# Patient Record
Sex: FEMALE | Race: WHITE | Employment: FULL TIME | URBAN - METROPOLITAN AREA
[De-identification: names, ages, dates, MRNs, and addresses within clinical notes are randomized per-mention and may not be internally consistent; named-entity substitution may affect disease eponyms.]

---

## 2017-07-24 PROBLEM — O09.299 HX OF PREECLAMPSIA, PRIOR PREGNANCY, CURRENTLY PREGNANT: Status: ACTIVE | Noted: 2017-07-24

## 2017-07-24 PROBLEM — Z20.828 PARVOVIRUS EXPOSURE: Status: ACTIVE | Noted: 2017-07-24

## 2017-07-24 PROBLEM — R01.1 HEART MURMUR: Status: ACTIVE | Noted: 2017-07-24

## 2017-07-26 PROBLEM — O09.899 MATERNAL VARICELLA, NON-IMMUNE: Status: ACTIVE | Noted: 2017-07-26

## 2017-07-26 PROBLEM — Z28.39 MATERNAL VARICELLA, NON-IMMUNE: Status: ACTIVE | Noted: 2017-07-26

## 2018-02-19 PROBLEM — B95.1 GROUP BETA STREP POSITIVE: Status: ACTIVE | Noted: 2018-02-19

## 2018-03-13 ENCOUNTER — HOSPITAL ENCOUNTER (EMERGENCY)
Age: 28
Discharge: HOME | End: 2018-03-13
Attending: OBSTETRICS & GYNECOLOGY | Admitting: OBSTETRICS & GYNECOLOGY
Payer: COMMERCIAL

## 2018-03-13 VITALS
TEMPERATURE: 98.1 F | SYSTOLIC BLOOD PRESSURE: 126 MMHG | WEIGHT: 226 LBS | BODY MASS INDEX: 35.47 KG/M2 | RESPIRATION RATE: 18 BRPM | DIASTOLIC BLOOD PRESSURE: 77 MMHG | OXYGEN SATURATION: 98 % | HEIGHT: 67 IN | HEART RATE: 100 BPM

## 2018-03-13 PROBLEM — O26.893 ABDOMINAL PAIN IN PREGNANCY, THIRD TRIMESTER: Status: ACTIVE | Noted: 2018-03-13

## 2018-03-13 PROBLEM — R10.9 ABDOMINAL PAIN IN PREGNANCY, THIRD TRIMESTER: Status: ACTIVE | Noted: 2018-03-13

## 2018-03-13 LAB
GLUCOSE, GLUUPC: NEGATIVE
KETONES UR-MCNC: NEGATIVE MG/DL
PROT UR QL: NEGATIVE

## 2018-03-13 PROCEDURE — 59025 FETAL NON-STRESS TEST: CPT

## 2018-03-13 PROCEDURE — 76815 OB US LIMITED FETUS(S): CPT

## 2018-03-13 PROCEDURE — 81002 URINALYSIS NONAUTO W/O SCOPE: CPT | Performed by: OBSTETRICS & GYNECOLOGY

## 2018-03-13 PROCEDURE — 99285 EMERGENCY DEPT VISIT HI MDM: CPT

## 2018-03-13 NOTE — IP AVS SNAPSHOT
[address] 
[phone] Patient: Amanda Segundo MRN: [id number] formerly [facility]: About your hospitalization You were admitted on:  N/A You last received care in the:  [facility] 4 OLLIE You were discharged on:  2018 Why you were hospitalized Your primary diagnosis was:  Not on File Your diagnoses also included:  Abdominal Pain In Pregnancy, Third Trimester Follow-up Information Follow up With Details Comments Contact Info Provider Unknown   Patient not available to ask Puja Caro MD Call As needed [address] [place] [address] 204 [facility] [address] 
[phone] Your Scheduled Appointments 2018 10:00 AM EDT  
OB VISIT with Stacey Cornelius MD  
[facility] ([place] [address]) [address] [place] [address] [address] [address] [phone] Discharge Orders None A check edis indicates which time of day the medication should be taken. My Medications ASK your doctor about these medications Instructions Each Dose to Equal  
 Morning Noon Evening Bedtime  
 aspirin delayed-release 81 mg tablet Your last dose was: Your next dose is: Take  by mouth daily. PNV#16-Iron Fum & PS-FA-OM-3 35-1-200 mg Cap Commonly known as:  CONCEPT DHA Your last dose was: Your next dose is: Take 1 Tab by mouth daily. 1 Tab VITAMIN D3 1,000 unit tablet Generic drug:  cholecalciferol Your last dose was: Your next dose is: Take  by mouth daily. Discharge Instructions Pregnancy Precautions: Care Instructions Your Care Instructions There is no sure way to prevent labor before your due date ( labor) or to prevent most other pregnancy problems. But there are things you can do to increase your chances of a healthy pregnancy. Go to your appointments, follow your doctor's advice, and take good care of yourself. Eat well, and exercise (if your doctor agrees). And make sure to drink plenty of water. Follow-up care is a key part of your treatment and safety. Be sure to make and go to all appointments, and call your doctor if you are having problems. It's also a good idea to know your test results and keep a list of the medicines you take. How can you care for yourself at home? · Make sure you go to your prenatal appointments. At each visit, your doctor will check your blood pressure. Your doctor will also check to see if you have protein in your urine. High blood pressure and protein in urine are signs of preeclampsia. This condition can be dangerous for you and your baby. · Drink plenty of fluids, enough so that your urine is light yellow or clear like water. Dehydration can cause contractions. If you have kidney, heart, or liver disease and have to limit fluids, talk with your doctor before you increase the amount of fluids you drink. · Tell your doctor right away if you notice any symptoms of an infection, such as: ¨ Burning when you urinate. ¨ A foul-smelling discharge from your vagina. ¨ Vaginal itching. ¨ Unexplained fever. ¨ Unusual pain or soreness in your uterus or lower belly. · Eat a balanced diet. Include plenty of foods that are high in calcium and iron. ¨ Foods high in calcium include milk, cheese, yogurt, almonds, and broccoli. ¨ Foods high in iron include red meat, shellfish, poultry, eggs, beans, raisins, whole-grain bread, and leafy green vegetables. · Do not smoke. If you need help quitting, talk to your doctor about stop-smoking programs and medicines. These can increase your chances of quitting for good. · Do not drink alcohol or use illegal drugs. · Follow your doctor's directions about activity. Your doctor will let you know how much, if any, exercise you can do. · Ask your doctor if you can have sex. If you are at risk for early labor, your doctor may ask you to not have sex. · Take care to prevent falls. During pregnancy, your joints are loose, and your balance is off. Sports such as bicycling, skiing, or in-line skating can increase your risk of falling. And don't ride horses or motorcycles, dive, water ski, scuba dive, or parachute jump while you are pregnant. · Avoid getting very hot. Do not use saunas or hot tubs. Avoid staying out in the sun in hot weather for long periods. Take acetaminophen (Tylenol) to lower a high fever. · Do not take any over-the-counter or herbal medicines or supplements without talking to your doctor or pharmacist first. 
When should you call for help? Call 911 anytime you think you may need emergency care. For example, call if: 
? · You passed out (lost consciousness). ? · You have severe vaginal bleeding. ? · You have severe pain in your belly or pelvis. ? · You have had fluid gushing or leaking from your vagina and you know or think the umbilical cord is bulging into your vagina. If this happens, immediately get down on your knees so your rear end (buttocks) is higher than your head. This will decrease the pressure on the cord until help arrives. ?Call your doctor now or seek immediate medical care if: 
? · You have signs of preeclampsia, such as: 
¨ Sudden swelling of your face, hands, or feet. ¨ New vision problems (such as dimness or blurring). ¨ A severe headache. ? · You have any vaginal bleeding. ? · You have belly pain or cramping. ? · You have a fever. ? · You have had regular contractions (with or without pain) for an hour. This means that you have 8 or more within 1 hour or 4 or more in 20 minutes after you change your position and drink fluids. ? · You have a sudden release of fluid from your vagina. ? · You have low back pain or pelvic pressure that does not go away. ? · You notice that your baby has stopped moving or is moving much less than normal. ? Watch closely for changes in your health, and be sure to contact your doctor if you have any problems. Where can you learn more? Go to http://jonathan-yann.info/. Enter [id number] in the search box to learn more about \"Pregnancy Precautions: Care Instructions. \" Current as of: March 16, 2017 Content Version: 11.4 © 9973-2129 [employer]. Care instructions adapted under license by linkedÃ¼ (which disclaims liability or warranty for this information). If you have questions about a medical condition or this instruction, always ask your healthcare professional. [address] any warranty or liability for your use of this information. Introducing [facility]! Dear Laya Said: 
Thank you for requesting a [employer] account. Our records indicate that you already have an active [employer] account. You can access your account anytime at [email or web address] Did you know that you can access your hospital and ER discharge instructions at any time in [employer]? You can also review all of your test results from your hospital stay or ER visit. Additional Information If you have questions, please visit the Frequently Asked Questions section of the [employer] website at [email or web address] Remember, [employer] is NOT to be used for urgent needs. For medical emergencies, dial 911. Now available from your iPhone and Android! Providers Seen During Your Hospitalization Provider Specialty Primary office phone Anish Bradley MD Obstetrics & Gynecology [phone] Your Primary Care Physician (PCP) Primary Care Physician Office Phone Office Fax UNKNOWN, PROVIDER ** None ** ** None ** You are allergic to the following Allergen Reactions Penicillins Anaphylaxis Swilling in the hands and throat Amoxicillin Rash Bactrim (Sulfamethoprim Ds) Nausea Only Recent Documentation Height Weight BMI OB Status Smoking Status 1.702 m 102.5 kg 35.4 kg/m2 Pregnant Never Smoker Emergency Contacts Name Discharge Info Relation Home Work Mobile [address] [employer]  Spouse [3] [phone] Patient Belongings The following personal items are in your possession at time of discharge: 
                             
 
  
  
 Please provide this summary of care documentation to your next provider. Signatures-by signing, you are acknowledging that this After Visit Summary has been reviewed with you and you have received a copy. Patient Signature:  ____________________________________________________________ Date:  ____________________________________________________________  
  
Aria Moyer Provider Signature:  ____________________________________________________________ Date:  ____________________________________________________________

## 2018-03-13 NOTE — ED PROVIDER NOTES
Chief Complaint:contractions      28 y.o. female   at 39w4d  weeks gestation who is seen for moderate abdominal pain. Pt reports contractions since yesterday. More frequent contractions today- occurring regularly about every 5 minutes. HISTORY:    History   Sexual Activity    Sexual activity: Yes    Partners: Male    Birth control/ protection: None     Patient's last menstrual period was 2017. Social History     Social History    Marital status:      Spouse name: N/A    Number of children: N/A    Years of education: N/A     Occupational History    Not on file. Social History Main Topics    Smoking status: Never Smoker    Smokeless tobacco: Never Used    Alcohol use No      Comment: occasionally    Drug use: No    Sexual activity: Yes     Partners: Male     Birth control/ protection: None     Other Topics Concern    Not on file     Social History Narrative       Past Surgical History:   Procedure Laterality Date    HX WISDOM TEETH EXTRACTION         Past Medical History:   Diagnosis Date    Asthma     exercise induced    Headache     Heart abnormality     heart murmur    Heart murmur     patient states is benign    Hypertension     Other ill-defined conditions(799.89)     Mono    PCOS (polycystic ovarian syndrome) 2014    Polycystic disease, ovaries     Preeclampsia     UTI (urinary tract infection)          ROS:  A 12 point review of symptoms negative except for chief complaint as described above. PHYSICAL EXAM:  Blood pressure 126/77, pulse 100, temperature 98.1 °F (36.7 °C), resp. rate 18, height 5' 7\" (1.702 m), weight 102.5 kg (226 lb), last menstrual period 2017, SpO2 98 %, not currently breastfeeding. Constitutional: The patient appears well, alert, oriented x 3. Cardiovascular: Heart RRR, no murmurs.    Respiratory: Lungs clear, no respiratory distress  GI: Abdomen soft, nontender, no guarding  No fundal tenderness  Musculoskeletal: no cva tenderness  Upper ext: no edema, reflexes +2  Lower ext: no edema, neg calin's, reflexes +2  Skin: no rashes or lesions  Psychiatric:Mood/ Affect: appropriate  Genitourinary: SVE:3/70/-2 vertex  FHT:160's mod variability, accels, occaisonal small variables  TOCO:q7-10 min    Bedside ultrasound- vertex, keysha = 9, lots of fetal movement and tone seen, fetal breathing seen  Ant placenta grade 2  I personally reviewed pt's medical record including relevant labs and ultrasounds    Assessment/Plan:  27 yo  at 39w4d in with early latent labor   Reactive nst, bedside ultrasound with excellent movement, tone and breathing  Home with precautions  gbs + , pcn allergic, resistant to clindamycin- plan in chart by dr. Jessica Flores is for vancomycin

## 2018-03-13 NOTE — PROGRESS NOTES
Discharge instructions and pregnancy precautions given by Dr Jed Méndez; patient verbalized understanding; instructions signed, but patient left before printed copy given

## 2018-03-13 NOTE — DISCHARGE INSTRUCTIONS
Pregnancy Precautions: Care Instructions  Your Care Instructions    There is no sure way to prevent labor before your due date ( labor) or to prevent most other pregnancy problems. But there are things you can do to increase your chances of a healthy pregnancy. Go to your appointments, follow your doctor's advice, and take good care of yourself. Eat well, and exercise (if your doctor agrees). And make sure to drink plenty of water. Follow-up care is a key part of your treatment and safety. Be sure to make and go to all appointments, and call your doctor if you are having problems. It's also a good idea to know your test results and keep a list of the medicines you take. How can you care for yourself at home? · Make sure you go to your prenatal appointments. At each visit, your doctor will check your blood pressure. Your doctor will also check to see if you have protein in your urine. High blood pressure and protein in urine are signs of preeclampsia. This condition can be dangerous for you and your baby. · Drink plenty of fluids, enough so that your urine is light yellow or clear like water. Dehydration can cause contractions. If you have kidney, heart, or liver disease and have to limit fluids, talk with your doctor before you increase the amount of fluids you drink. · Tell your doctor right away if you notice any symptoms of an infection, such as:  ¨ Burning when you urinate. ¨ A foul-smelling discharge from your vagina. ¨ Vaginal itching. ¨ Unexplained fever. ¨ Unusual pain or soreness in your uterus or lower belly. · Eat a balanced diet. Include plenty of foods that are high in calcium and iron. ¨ Foods high in calcium include milk, cheese, yogurt, almonds, and broccoli. ¨ Foods high in iron include red meat, shellfish, poultry, eggs, beans, raisins, whole-grain bread, and leafy green vegetables. · Do not smoke.  If you need help quitting, talk to your doctor about stop-smoking programs and medicines. These can increase your chances of quitting for good. · Do not drink alcohol or use illegal drugs. · Follow your doctor's directions about activity. Your doctor will let you know how much, if any, exercise you can do. · Ask your doctor if you can have sex. If you are at risk for early labor, your doctor may ask you to not have sex. · Take care to prevent falls. During pregnancy, your joints are loose, and your balance is off. Sports such as bicycling, skiing, or in-line skating can increase your risk of falling. And don't ride horses or motorcycles, dive, water ski, scuba dive, or parachute jump while you are pregnant. · Avoid getting very hot. Do not use saunas or hot tubs. Avoid staying out in the sun in hot weather for long periods. Take acetaminophen (Tylenol) to lower a high fever. · Do not take any over-the-counter or herbal medicines or supplements without talking to your doctor or pharmacist first.  When should you call for help? Call 911 anytime you think you may need emergency care. For example, call if:  ? · You passed out (lost consciousness). ? · You have severe vaginal bleeding. ? · You have severe pain in your belly or pelvis. ? · You have had fluid gushing or leaking from your vagina and you know or think the umbilical cord is bulging into your vagina. If this happens, immediately get down on your knees so your rear end (buttocks) is higher than your head. This will decrease the pressure on the cord until help arrives. ?Call your doctor now or seek immediate medical care if:  ? · You have signs of preeclampsia, such as:  ¨ Sudden swelling of your face, hands, or feet. ¨ New vision problems (such as dimness or blurring). ¨ A severe headache. ? · You have any vaginal bleeding. ? · You have belly pain or cramping. ? · You have a fever. ? · You have had regular contractions (with or without pain) for an hour.  This means that you have 8 or more within 1 hour or 4 or more in 20 minutes after you change your position and drink fluids. ? · You have a sudden release of fluid from your vagina. ? · You have low back pain or pelvic pressure that does not go away. ? · You notice that your baby has stopped moving or is moving much less than normal.   ? Watch closely for changes in your health, and be sure to contact your doctor if you have any problems. Where can you learn more? Go to http://jonathan-yann.info/. Enter [id number] in the search box to learn more about \"Pregnancy Precautions: Care Instructions. \"  Current as of: March 16, 2017  Content Version: 11.4  © 2324-2163 [employer]. Care instructions adapted under license by [employer] (which disclaims liability or warranty for this information). If you have questions about a medical condition or this instruction, always ask your healthcare professional. [address] any warranty or liability for your use of this information.

## 2018-03-13 NOTE — PROGRESS NOTES
Patient presented to [facility] ED with complaint of uterine contractions; frequency 5-7 min per patient       03/13/18 1446   Maternal Vital Signs   Temp 98.1 °F (36.7 °C)   Temp Source Oral   Pulse (Heart Rate) 100   Resp Rate 18   O2 Sat (%) 98 %   Level of Consciousness Alert   /77   MAP (Calculated) 93   BP 1 Method Automatic   BP 1 Location Right arm   MEWS Score 1   Uterine Activity   Uterine Resting Tone Relaxed   Pain 1   Pain Scale 1 Numeric (0 - 10)   Pain Intensity 1 1   Patient Stated Pain Goal 2   Pain Location 1 Abdomen;Rectal   Pain Description 1 Intermittent;Pressure   Spinal Dermatomes   Motor Function Ambulate w/o assistance   Oxygen Therapy   O2 Device Room air   Height/Weight   Height 5' 7\" (1.702 m)   Weight 102.5 kg (226 lb)   Weight Source Patient stated   BSA (calculated - sq m) 2.2 sq meters   BMI (calculated) 35.4   Patient Observation   Patient Turned Turns self   Vaginal Discharge   Vaginal Discharge Yes   Color Clear   Consistency Thick   Amount Small   Vaginal Bleeding   Vaginal Bleeding No   Pre-Eclampsia Assessment   Headache No   Visual Disturbances No   Epigastric Pain No     Labor  Abdominal pain in pregnancy, third trimester    Hospital Problems  Date Reviewed: 3/6/2018          Codes Class Noted POA    Abdominal pain in pregnancy, third trimester ICD-10-CM: O26.893, R10.9  ICD-9-CM: 646.83, 789.00  3/13/2018 Unknown              <principal problem not specified>    Patient Active Problem List   Diagnosis Code    Encounter for immunization Z23    PCOS (polycystic ovarian syndrome) E28.2    Pregnancy Z34.90    Heart murmur R01.1    Hx of preeclampsia, prior pregnancy, currently pregnant O09.299    Maternal varicella, non-immune O09.899, Z28.3    Group beta Strep positive B95.1    Abdominal pain in pregnancy, third trimester O26.893, R10.9       Patient Active Problem List    Diagnosis Date Noted    Abdominal pain in pregnancy, third trimester 03/13/2018    Group beta Strep positive 02/19/2018    Maternal varicella, non-immune 07/26/2017    Heart murmur 07/24/2017    Hx of preeclampsia, prior pregnancy, currently pregnant 07/24/2017    Pregnancy 10/28/2015    PCOS (polycystic ovarian syndrome) 11/12/2014    Encounter for immunization 07/23/2014       Allergies   Allergen Reactions    Penicillins Anaphylaxis     Swilling in the hands and throat     Amoxicillin Rash    Bactrim [Sulfamethoprim Ds] Nausea Only     Lab Results   Component Value Date/Time    Antibody screen Negative 07/24/2017 02:58 PM    Antibody screen, External Negative 07/24/2017    HBsAg, External Negative 07/24/2017    HIV, External NR 07/24/2017    Rubella, External Immune 07/24/2017    RPR, External NR 07/24/2017    Gonorrhea, External negative 09/05/2017    Chlamydia, External negative 09/05/2017    GrBStrep, External positive 02/16/2018    ABO,Rh O positive 07/24/2017

## 2018-03-14 ENCOUNTER — HOSPITAL ENCOUNTER (INPATIENT)
Age: 28
LOS: 3 days | Discharge: HOME | End: 2018-03-17
Attending: OBSTETRICS & GYNECOLOGY | Admitting: OBSTETRICS & GYNECOLOGY
Payer: COMMERCIAL

## 2018-03-14 DIAGNOSIS — Z37.9 NORMAL LABOR: Primary | ICD-10-CM

## 2018-03-14 LAB
ERYTHROCYTE [DISTWIDTH] IN BLOOD BY AUTOMATED COUNT: 16.4 % (ref 11.9–14.6)
HCT VFR BLD AUTO: 34.4 % (ref 35.8–46.3)
HGB BLD-MCNC: 11.1 G/DL (ref 11.7–15.4)
MCH RBC QN AUTO: 25.5 PG (ref 26.1–32.9)
MCHC RBC AUTO-ENTMCNC: 32.3 G/DL (ref 31.4–35)
MCV RBC AUTO: 79.1 FL (ref 79.6–97.8)
PLATELET # BLD AUTO: 390 K/UL (ref 150–450)
PMV BLD AUTO: 10.7 FL (ref 10.8–14.1)
RBC # BLD AUTO: 4.35 M/UL (ref 4.05–5.25)
WBC # BLD AUTO: 16.3 K/UL (ref 4.3–11.1)

## 2018-03-14 PROCEDURE — 4A1HXCZ MONITORING OF PRODUCTS OF CONCEPTION, CARDIAC RATE, EXTERNAL APPROACH: ICD-10-PCS | Performed by: OBSTETRICS & GYNECOLOGY

## 2018-03-14 PROCEDURE — 77030014125 HC TY EPDRL BBMI -B: Performed by: NURSE ANESTHETIST, CERTIFIED REGISTERED

## 2018-03-14 PROCEDURE — 74011250636 HC RX REV CODE- 250/636

## 2018-03-14 PROCEDURE — 74011250636 HC RX REV CODE- 250/636: Performed by: OBSTETRICS & GYNECOLOGY

## 2018-03-14 PROCEDURE — A4300 CATH IMPL VASC ACCESS PORTAL: HCPCS | Performed by: NURSE ANESTHETIST, CERTIFIED REGISTERED

## 2018-03-14 PROCEDURE — 74011258636 HC RX REV CODE- 258/636: Performed by: OBSTETRICS & GYNECOLOGY

## 2018-03-14 PROCEDURE — 86900 BLOOD TYPING SEROLOGIC ABO: CPT | Performed by: OBSTETRICS & GYNECOLOGY

## 2018-03-14 PROCEDURE — 65270000029 HC RM PRIVATE

## 2018-03-14 PROCEDURE — 85027 COMPLETE CBC AUTOMATED: CPT | Performed by: OBSTETRICS & GYNECOLOGY

## 2018-03-14 RX ORDER — SODIUM CHLORIDE 0.9 % (FLUSH) 0.9 %
5-10 SYRINGE (ML) INJECTION AS NEEDED
Status: DISCONTINUED | OUTPATIENT
Start: 2018-03-14 | End: 2018-03-15

## 2018-03-14 RX ORDER — DEXTROSE, SODIUM CHLORIDE, SODIUM LACTATE, POTASSIUM CHLORIDE, AND CALCIUM CHLORIDE 5; .6; .31; .03; .02 G/100ML; G/100ML; G/100ML; G/100ML; G/100ML
125 INJECTION, SOLUTION INTRAVENOUS CONTINUOUS
Status: DISCONTINUED | OUTPATIENT
Start: 2018-03-15 | End: 2018-03-15

## 2018-03-14 RX ORDER — ONDANSETRON 2 MG/ML
4 INJECTION INTRAMUSCULAR; INTRAVENOUS
Status: DISCONTINUED | OUTPATIENT
Start: 2018-03-14 | End: 2018-03-15 | Stop reason: HOSPADM

## 2018-03-14 RX ORDER — SODIUM CHLORIDE 0.9 % (FLUSH) 0.9 %
5-10 SYRINGE (ML) INJECTION EVERY 8 HOURS
Status: DISCONTINUED | OUTPATIENT
Start: 2018-03-15 | End: 2018-03-15

## 2018-03-14 RX ORDER — BUTORPHANOL TARTRATE 1 MG/ML
1 INJECTION INTRAMUSCULAR; INTRAVENOUS
Status: DISCONTINUED | OUTPATIENT
Start: 2018-03-14 | End: 2018-03-15 | Stop reason: SDUPTHER

## 2018-03-14 RX ORDER — LIDOCAINE HYDROCHLORIDE 20 MG/ML
JELLY TOPICAL
Status: DISCONTINUED | OUTPATIENT
Start: 2018-03-14 | End: 2018-03-15 | Stop reason: HOSPADM

## 2018-03-14 RX ORDER — MINERAL OIL
120 OIL (ML) ORAL
Status: DISCONTINUED | OUTPATIENT
Start: 2018-03-14 | End: 2018-03-15 | Stop reason: HOSPADM

## 2018-03-14 RX ORDER — FENTANYL CITRATE 50 UG/ML
INJECTION, SOLUTION INTRAMUSCULAR; INTRAVENOUS
Status: COMPLETED
Start: 2018-03-14 | End: 2018-03-15

## 2018-03-14 RX ORDER — LIDOCAINE HYDROCHLORIDE 10 MG/ML
1 INJECTION, SOLUTION EPIDURAL; INFILTRATION; INTRACAUDAL; PERINEURAL
Status: DISCONTINUED | OUTPATIENT
Start: 2018-03-14 | End: 2018-03-15 | Stop reason: HOSPADM

## 2018-03-14 RX ORDER — OXYTOCIN/0.9 % SODIUM CHLORIDE 15/250 ML
250 PLASTIC BAG, INJECTION (ML) INTRAVENOUS ONCE
Status: ACTIVE | OUTPATIENT
Start: 2018-03-15 | End: 2018-03-15

## 2018-03-14 RX ADMIN — SODIUM CHLORIDE, SODIUM LACTATE, POTASSIUM CHLORIDE, CALCIUM CHLORIDE, AND DEXTROSE MONOHYDRATE 125 ML/HR: 600; 310; 30; 20; 5 INJECTION, SOLUTION INTRAVENOUS at 23:20

## 2018-03-14 RX ADMIN — VANCOMYCIN HYDROCHLORIDE 1000 MG: 1 INJECTION, POWDER, LYOPHILIZED, FOR SOLUTION INTRAVENOUS at 23:47

## 2018-03-14 RX ADMIN — SODIUM CHLORIDE, SODIUM LACTATE, POTASSIUM CHLORIDE, AND CALCIUM CHLORIDE 1000 ML: 600; 310; 30; 20 INJECTION, SOLUTION INTRAVENOUS at 23:20

## 2018-03-14 NOTE — IP AVS SNAPSHOT
303 [facility] [address] 
[phone] Patient: Pepe Farris MRN: [id number] [id number] About your hospitalization You were admitted on:  March 14, 2018 You last received care in the:  [address] [facility] You were discharged on:  March 17, 2018 Why you were hospitalized Your primary diagnosis was:  Not on File Your diagnoses also included:  Normal Labor Follow-up Information Follow up With Details Comments Contact Info Meliton Ramirez MD Schedule an appointment as soon as possible for a visit in 2 weeks  [address] [place] [address] 204 [facility] [address] 
[phone] Discharge Orders None A check edis indicates which time of day the medication should be taken. My Medications START taking these medications Instructions Each Dose to Equal  
 Morning Noon Evening Bedtime HYDROcodone-acetaminophen 7.5-325 mg per tablet Commonly known as:  Ирина Sosa Your last dose was: Your next dose is: Take 1 Tab by mouth every four (4) hours as needed. Max Daily Amount: 6 Tabs. Indications: Pain 1 Tab  
    
   
   
   
  
 ibuprofen 600 mg tablet Commonly known as:  MOTRIN Your last dose was: Your next dose is: Take 1 Tab by mouth every six (6) hours as needed. Indications: Pain 600 mg CONTINUE taking these medications Instructions Each Dose to Equal  
 Morning Noon Evening Bedtime PNV#16-Iron Fum & PS-FA-OM-3 35-1-200 mg Cap Commonly known as:  CONCEPT DHA Your last dose was: Your next dose is: Take 1 Tab by mouth daily. 1 Tab VITAMIN D3 1,000 unit tablet Generic drug:  cholecalciferol Your last dose was: Your next dose is: Take  by mouth daily. STOP taking these medications   
 aspirin delayed-release 81 mg tablet Where to Get Your Medications Information on where to get these meds will be given to you by the nurse or doctor. ! Ask your nurse or doctor about these medications HYDROcodone-acetaminophen 7.5-325 mg per tablet  
 ibuprofen 600 mg tablet Discharge Instructions Discharge instruction to follow: Activity: Pelvis rest for 6 weeks No heavy lifting over 15 lbs for 2 weeks No driving for 2 weeks No push/pull motion such as sweeping or vacuuming for 2 weeks No tub baths for 6 weeks Continue using the dagoberto-bottle after each void or bowel movement. If using sitz bath continue until comfortable stopping. Change sanitary pad after each urination or bowel movement. Call MD for the following: 
    Fever over 101 F; pain not relieved by medication; foul smelling vaginal discharge or an increase in vaginal bleeding. Take medication as prescribed. Follow up with MD as order. DISCHARGE SUMMARY from Nurse What to do at Home: 
Recommended activity: Activity as tolerated, *  Please give a list of your current medications to your Primary Care Provider. *  Please update this list whenever your medications are discontinued, doses are 
    changed, or new medications (including over-the-counter products) are added. *  Please carry medication information at all times in case of emergency situations. These are general instructions for a healthy lifestyle: No smoking/ No tobacco products/ Avoid exposure to second hand smoke Surgeon General's Warning:  Quitting smoking now greatly reduces serious risk to your health. Obesity, smoking, and sedentary lifestyle greatly increases your risk for illness A healthy diet, regular physical exercise & weight monitoring are important for maintaining a healthy lifestyle You may be retaining fluid if you have a history of heart failure or if you experience any of the following symptoms:  Weight gain of 3 pounds or more overnight or 5 pounds in a week, increased swelling in our hands or feet or shortness of breath while lying flat in bed. Please call your doctor as soon as you notice any of these symptoms; do not wait until your next office visit. Recognize signs and symptoms of STROKE: 
 
F-face looks uneven A-arms unable to move or move unevenly S-speech slurred or non-existent T-time-call 911 as soon as signs and symptoms begin-DO NOT go Back to bed or wait to see if you get better-TIME IS BRAIN. Warning Signs of HEART ATTACK Call 911 if you have these symptoms: 
? Chest discomfort. Most heart attacks involve discomfort in the center of the chest that lasts more than a few minutes, or that goes away and comes back. It can feel like uncomfortable pressure, squeezing, fullness, or pain. ? Discomfort in other areas of the upper body. Symptoms can include pain or discomfort in one or both arms, the back, neck, jaw, or stomach. ? Shortness of breath with or without chest discomfort. ? Other signs may include breaking out in a cold sweat, nausea, or lightheadedness. Don't wait more than five minutes to call [address]! Fast action can save your life. Calling 911 is almost always the fastest way to get lifesaving treatment. Emergency Medical Services staff can begin treatment when they arrive  up to an hour sooner than if someone gets to the hospital by car. The discharge information has been reviewed with the patient. The patient verbalized understanding. Discharge medications reviewed with the patient and appropriate educational materials and side effects teaching were provided. ___________________________________________________________________________________________________________________________________ Vaginal Childbirth: Care Instructions Your Care Instructions Your body will slowly heal in the next few weeks. It is easy to get too tired and overwhelmed during the first weeks after your baby is born. Changes in your hormones can shift your mood without warning. You may find it hard to meet the extra demands on your energy and time. Take it easy on yourself. Follow-up care is a key part of your treatment and safety. Be sure to make and go to all appointments, and call your doctor if you are having problems. It's also a good idea to know your test results and keep a list of the medicines you take. How can you care for yourself at home? · Vaginal bleeding and cramps ¨ After delivery, you will have a bloody discharge from the vagina. This will turn pink within a week and then white or yellow after about 10 days. It may last for 2 to 4 weeks or longer, until the uterus has healed. Use pads instead of tampons until you stop bleeding. ¨ Do not worry if you pass some blood clots, as long as they are smaller than a golf ball. If you have a tear or stitches in your vaginal area, change the pad at least every 4 hours to prevent soreness and infection. ¨ You may have cramps for the first few days after childbirth. These are normal and occur as the uterus shrinks to normal size. Take an over-the-counter pain medicine, such as acetaminophen (Tylenol), ibuprofen (Advil, Motrin), or naproxen (Aleve), for cramps. Read and follow all instructions on the label. Do not take aspirin, because it can cause more bleeding. ¨ Do not take two or more pain medicines at the same time unless the doctor told you to. Many pain medicines have acetaminophen, which is Tylenol. Too much acetaminophen (Tylenol) can be harmful. · Stitches ¨ If you have stitches, they will dissolve on their own and do not need to be removed. Follow your doctor's instructions for cleaning the stitched area. ¨ Put ice or a cold pack on your painful area for 10 to 20 minutes at a time, several times a day, for the first few days. Put a thin cloth between the ice and your skin. ¨ Sit in a few inches of warm water (sitz bath) 3 times a day and after bowel movements. The warm water helps with pain and itching. If you do not have a tub, a warm shower might help. · Breast fullness ¨ Your breasts may overfill (engorge) in the first few days after delivery. To help milk flow and to relieve pain, warm your breasts in the shower or by using warm, moist towels before nursing. ¨ If you are not nursing, do not put warmth on your breasts or touch your breasts. Wear a tight bra or sports bra and use ice until the fullness goes away. This usually takes 2 to 3 days. ¨ Put ice or a cold pack on your breast after nursing to reduce swelling and pain. Put a thin cloth between the ice and your skin. · Activity ¨ Eat a balanced diet. Do not try to lose weight by cutting calories. Keep taking your prenatal vitamins, or take a multivitamin. ¨ Get as much rest as you can. Try to take naps when your baby sleeps during the day. ¨ Get some exercise every day. But do not do any heavy exercise until your doctor says it is okay. ¨ Wait until you are healed (about 4 to 6 weeks) before you have sexual intercourse. Your doctor will tell you when it is okay to have sex. ¨ Talk to your doctor about birth control. You can get pregnant even before your period returns. Also, you can get pregnant while you are breastfeeding. · Mental health ¨ It is normal to have some sadness, anxiety, sleeplessness, and mood swings after you go home. If you feel upset or hopeless for more than a few days or are having trouble doing the things you need to do, talk to your doctor. · Constipation and hemorrhoids ¨ Drink plenty of fluids, enough so that your urine is light yellow or clear like water.  If you have kidney, heart, or liver disease and have to limit fluids, talk with your doctor before you increase the amount of fluids you drink. ¨ Eat plenty of fiber each day. Have a bran muffin or bran cereal for breakfast, and try eating a piece of fruit for a mid-afternoon snack. ¨ For painful, itchy hemorrhoids, put ice or a cold pack on the area several times a day for 10 minutes at a time. Follow this by putting a warm compress on the area for another 10 to 20 minutes or by sitting in a shallow, warm bath. When should you call for help? Call 911 anytime you think you may need emergency care. For example, call if: 
? · You passed out (lost consciousness). ?Call your doctor now or seek immediate medical care if: 
? · You have severe vaginal bleeding. ? · You are dizzy or lightheaded, or you feel like you may faint. ? · You have a fever. ? · You have new or more pain in your belly or pelvis. ? Watch closely for changes in your health, and be sure to contact your doctor if: 
? · Your vaginal bleeding seems to be getting heavier. ? · You have new or worse vaginal discharge. ? · You feel sad, anxious, or hopeless for more than a few days. ? · You do not get better as expected. Where can you learn more? Go to http://jonathan-yann.info/. Enter G185 in the search box to learn more about \"Vaginal Childbirth: Care Instructions. \" Current as of: March 16, 2017 Content Version: 11.4 © 5729-5655 [employer]. Care instructions adapted under license by [employer] (which disclaims liability or warranty for this information). If you have questions about a medical condition or this instruction, always ask your healthcare professional. [place] [address] any warranty or liability for your use of this information. [facility] Announcement We are excited to announce that we are making your provider's discharge notes available to you in [facility].   You will see these notes when they are completed and signed by the physician that discharged you from your recent hospital stay. If you have any questions or concerns about any information you see in [employer]iate, please call the Health Information Department where you were seen or reach out to your Primary Care Provider for more information about your plan of care. Introducing [facility] & HEALTH SERVICES! Dear Laya Said: 
Thank you for requesting a Netotiate account. Our records indicate that you already have an active Netotiate account. You can access your account anytime at [email or web address] Did you know that you can access your hospital and ER discharge instructions at any time in Netotiate? You can also review all of your test results from your hospital stay or ER visit. Additional Information If you have questions, please visit the Frequently Asked Questions section of the Netotiate website at [email or web address] Remember, Netotiate is NOT to be used for urgent needs. For medical emergencies, dial 911. Now available from your iPhone and Android! Providers Seen During Your Hospitalization Provider Specialty Primary office phone Peter GuerreroangleDO Obstetrics & Gynecology [phone] Immunizations Administered for This Admission Name Date Influenza Vaccine (Quad) PF  Deferred () Your Primary Care Physician (PCP) Primary Care Physician Office Phone Office Fax UNKNOWN, PROVIDER ** None ** ** None ** You are allergic to the following Allergen Reactions Penicillins Anaphylaxis Swilling in the hands and throat Amoxicillin Rash Bactrim (Sulfamethoprim Ds) Nausea Only Recent Documentation Height Weight Breastfeeding? BMI OB Status Smoking Status 1.702 m 102.5 kg Yes 35.4 kg/m2 Recent pregnancy Never Smoker Emergency Contacts Name Discharge Info Relation Home Work Mobile [address] [facility]  Spouse [3] [phone] Patient Belongings The following personal items are in your possession at time of discharge: 
  Dental Appliances: None  Visual Aid: Glasses, At home      Home Medications: None   Jewelry: None  Clothing: At bedside, Footwear, Jacket/Coat, Pants, Shirt, Undergarments    Other Valuables: At bedside, Cell Phone  Personal Items Sent to Safe: declined Please provide this summary of care documentation to your next provider. Signatures-by signing, you are acknowledging that this After Visit Summary has been reviewed with you and you have received a copy. Patient Signature:  ____________________________________________________________ Date:  ____________________________________________________________  
  
[facility] Provider Signature:  ____________________________________________________________ Date:  ____________________________________________________________

## 2018-03-15 ENCOUNTER — ANESTHESIA EVENT (OUTPATIENT)
Dept: LABOR AND DELIVERY | Age: 28
End: 2018-03-15
Payer: COMMERCIAL

## 2018-03-15 ENCOUNTER — ANESTHESIA (OUTPATIENT)
Dept: LABOR AND DELIVERY | Age: 28
End: 2018-03-15
Payer: COMMERCIAL

## 2018-03-15 LAB
ABO + RH BLD: NORMAL
BASE DEFICIT BLDCOA-SCNC: 3.9 MMOL/L (ref 0–2)
BASE DEFICIT BLDCOV-SCNC: 5.4 MMOL/L (ref 1.9–7.7)
BDY SITE: ABNORMAL
BDY SITE: NORMAL
BLOOD GROUP ANTIBODIES SERPL: NORMAL
HCO3 BLDCOA-SCNC: 24 MMOL/L (ref 22–26)
HCO3 BLDV-SCNC: 21 MMOL/L
PCO2 BLDCOA: 53 MMHG (ref 33–49)
PCO2 BLDCOV: 43 MMHG (ref 14.1–43.3)
PH BLDCOA: 7.27 [PH] (ref 7.21–7.31)
PH BLDCOV: 7.3 [PH] (ref 7.2–7.44)
PO2 BLDCOA: 24 MMHG (ref 9–19)
PO2 BLDV: 34 MMHG (ref 30.4–57.2)
SERVICE CMNT-IMP: ABNORMAL
SERVICE CMNT-IMP: NORMAL
SPECIMEN EXP DATE BLD: NORMAL

## 2018-03-15 PROCEDURE — 77030003028 HC SUT VCRL J&J -A

## 2018-03-15 PROCEDURE — 74011250637 HC RX REV CODE- 250/637: Performed by: OBSTETRICS & GYNECOLOGY

## 2018-03-15 PROCEDURE — 75410000003 HC RECOV DEL/VAG/CSECN EA 0.5 HR

## 2018-03-15 PROCEDURE — 0HQ9XZZ REPAIR PERINEUM SKIN, EXTERNAL APPROACH: ICD-10-PCS | Performed by: OBSTETRICS & GYNECOLOGY

## 2018-03-15 PROCEDURE — 99283 EMERGENCY DEPT VISIT LOW MDM: CPT

## 2018-03-15 PROCEDURE — 75410000000 HC DELIVERY VAGINAL/SINGLE

## 2018-03-15 PROCEDURE — 75410000002 HC LABOR FEE PER 1 HR

## 2018-03-15 PROCEDURE — 74011250636 HC RX REV CODE- 250/636

## 2018-03-15 PROCEDURE — 77010026065 HC OXYGEN MINIMUM MEDICAL AIR

## 2018-03-15 PROCEDURE — 74011250636 HC RX REV CODE- 250/636: Performed by: OBSTETRICS & GYNECOLOGY

## 2018-03-15 PROCEDURE — 59025 FETAL NON-STRESS TEST: CPT

## 2018-03-15 PROCEDURE — 76060000078 HC EPIDURAL ANESTHESIA

## 2018-03-15 PROCEDURE — 65270000029 HC RM PRIVATE

## 2018-03-15 PROCEDURE — 74011250636 HC RX REV CODE- 250/636: Performed by: ANESTHESIOLOGY

## 2018-03-15 PROCEDURE — 74011000250 HC RX REV CODE- 250

## 2018-03-15 PROCEDURE — 77030018846 HC SOL IRR STRL H20 ICUM -A

## 2018-03-15 PROCEDURE — 36415 COLL VENOUS BLD VENIPUNCTURE: CPT | Performed by: OBSTETRICS & GYNECOLOGY

## 2018-03-15 PROCEDURE — 82803 BLOOD GASES ANY COMBINATION: CPT

## 2018-03-15 RX ORDER — ZOLPIDEM TARTRATE 5 MG/1
5 TABLET ORAL
Status: DISCONTINUED | OUTPATIENT
Start: 2018-03-15 | End: 2018-03-17 | Stop reason: HOSPADM

## 2018-03-15 RX ORDER — SODIUM CHLORIDE 0.9 % (FLUSH) 0.9 %
5-10 SYRINGE (ML) INJECTION AS NEEDED
Status: DISCONTINUED | OUTPATIENT
Start: 2018-03-15 | End: 2018-03-15 | Stop reason: HOSPADM

## 2018-03-15 RX ORDER — SIMETHICONE 80 MG
80 TABLET,CHEWABLE ORAL
Status: DISCONTINUED | OUTPATIENT
Start: 2018-03-15 | End: 2018-03-17 | Stop reason: HOSPADM

## 2018-03-15 RX ORDER — DIPHENHYDRAMINE HCL 25 MG
25 CAPSULE ORAL
Status: DISCONTINUED | OUTPATIENT
Start: 2018-03-15 | End: 2018-03-17 | Stop reason: HOSPADM

## 2018-03-15 RX ORDER — IBUPROFEN 400 MG/1
400 TABLET ORAL
Status: DISCONTINUED | OUTPATIENT
Start: 2018-03-15 | End: 2018-03-17 | Stop reason: HOSPADM

## 2018-03-15 RX ORDER — OXYCODONE AND ACETAMINOPHEN 10; 325 MG/1; MG/1
1 TABLET ORAL
Status: DISCONTINUED | OUTPATIENT
Start: 2018-03-15 | End: 2018-03-17 | Stop reason: HOSPADM

## 2018-03-15 RX ORDER — SODIUM CHLORIDE 0.9 % (FLUSH) 0.9 %
5-10 SYRINGE (ML) INJECTION EVERY 8 HOURS
Status: DISCONTINUED | OUTPATIENT
Start: 2018-03-15 | End: 2018-03-15 | Stop reason: HOSPADM

## 2018-03-15 RX ORDER — LIDOCAINE HYDROCHLORIDE AND EPINEPHRINE 15; 5 MG/ML; UG/ML
INJECTION, SOLUTION EPIDURAL AS NEEDED
Status: DISCONTINUED | OUTPATIENT
Start: 2018-03-15 | End: 2018-03-15 | Stop reason: HOSPADM

## 2018-03-15 RX ORDER — FENTANYL CITRATE 50 UG/ML
INJECTION, SOLUTION INTRAMUSCULAR; INTRAVENOUS AS NEEDED
Status: DISCONTINUED | OUTPATIENT
Start: 2018-03-15 | End: 2018-03-15 | Stop reason: HOSPADM

## 2018-03-15 RX ORDER — HYDROCODONE BITARTRATE AND ACETAMINOPHEN 7.5; 325 MG/1; MG/1
1 TABLET ORAL
Status: DISCONTINUED | OUTPATIENT
Start: 2018-03-15 | End: 2018-03-17 | Stop reason: HOSPADM

## 2018-03-15 RX ORDER — NALOXONE HYDROCHLORIDE 0.4 MG/ML
0.4 INJECTION, SOLUTION INTRAMUSCULAR; INTRAVENOUS; SUBCUTANEOUS AS NEEDED
Status: DISCONTINUED | OUTPATIENT
Start: 2018-03-15 | End: 2018-03-17 | Stop reason: HOSPADM

## 2018-03-15 RX ORDER — ROPIVACAINE HYDROCHLORIDE 2 MG/ML
INJECTION, SOLUTION EPIDURAL; INFILTRATION; PERINEURAL
Status: DISCONTINUED | OUTPATIENT
Start: 2018-03-15 | End: 2018-03-15 | Stop reason: HOSPADM

## 2018-03-15 RX ORDER — ROPIVACAINE HYDROCHLORIDE 2 MG/ML
INJECTION, SOLUTION EPIDURAL; INFILTRATION; PERINEURAL AS NEEDED
Status: DISCONTINUED | OUTPATIENT
Start: 2018-03-15 | End: 2018-03-15 | Stop reason: HOSPADM

## 2018-03-15 RX ADMIN — IBUPROFEN 400 MG: 400 TABLET, FILM COATED ORAL at 22:45

## 2018-03-15 RX ADMIN — ROPIVACAINE HYDROCHLORIDE 10 ML/HR: 2 INJECTION, SOLUTION EPIDURAL; INFILTRATION; PERINEURAL at 00:07

## 2018-03-15 RX ADMIN — LIDOCAINE HYDROCHLORIDE AND EPINEPHRINE 2 ML: 15; 5 INJECTION, SOLUTION EPIDURAL at 00:07

## 2018-03-15 RX ADMIN — IBUPROFEN 400 MG: 400 TABLET, FILM COATED ORAL at 03:51

## 2018-03-15 RX ADMIN — FENTANYL CITRATE 100 MCG: 50 INJECTION, SOLUTION INTRAMUSCULAR; INTRAVENOUS at 00:08

## 2018-03-15 RX ADMIN — IBUPROFEN 400 MG: 400 TABLET, FILM COATED ORAL at 15:03

## 2018-03-15 RX ADMIN — SODIUM CHLORIDE, SODIUM LACTATE, POTASSIUM CHLORIDE, AND CALCIUM CHLORIDE 1000 ML: 600; 310; 30; 20 INJECTION, SOLUTION INTRAVENOUS at 00:57

## 2018-03-15 RX ADMIN — LIDOCAINE HYDROCHLORIDE AND EPINEPHRINE 3 ML: 15; 5 INJECTION, SOLUTION EPIDURAL at 00:06

## 2018-03-15 RX ADMIN — IBUPROFEN 400 MG: 400 TABLET, FILM COATED ORAL at 19:01

## 2018-03-15 RX ADMIN — ROPIVACAINE HYDROCHLORIDE 5 ML: 2 INJECTION, SOLUTION EPIDURAL; INFILTRATION; PERINEURAL at 00:07

## 2018-03-15 RX ADMIN — HYDROCODONE BITARTRATE AND ACETAMINOPHEN 1 TABLET: 7.5; 325 TABLET ORAL at 22:45

## 2018-03-15 RX ADMIN — VANCOMYCIN HYDROCHLORIDE 1000 MG: 1 INJECTION, POWDER, LYOPHILIZED, FOR SOLUTION INTRAVENOUS at 00:52

## 2018-03-15 RX ADMIN — IBUPROFEN 400 MG: 400 TABLET, FILM COATED ORAL at 10:31

## 2018-03-15 NOTE — L&D DELIVERY NOTE
Delivery Summary    Patient: Lauren Oconnell MRN: [id number]  SSN: [id number]    YOB: 1990  Age: 28 y.o. Sex: female        Labor Events:    Labor: No    Rupture Date:      Rupture Time:      Rupture Type SROM    Amniotic Fluid Volume:      Amniotic Fluid Description: Meconium  None    Induction: None        Augmentation: None    Labor Complications: None     Additional Complications:        Cervical Ripening:       None      Delivery Events:  Episiotomy: None    Laceration(s): First degree perineal      Repaired: Yes     Number of Repair Packets:      Suture Type and Size:         Estimated Blood Loss (ml): 300        Information for the patient's :  Marcella Velez, Pending  [[id number]]     Delivery Summary - Baby    Delivery Date:    Delivery Time:    Delivery Type: Vaginal, Spontaneous Delivery  Sex:  unspecified sex  Gestational Age: 37w11d  Delivery Clinician:     Living?: Living   Delivery Location: L&D             APGARS  One minute Five minutes Ten minutes   Skin Color:            Heart Rate:             Reflex Irritability:             Muscle Tone:           Respiration:             Total:               Presentation: Vertex  Position: Left Occiput Anterior  Resuscitation Method:  None     Meconium Stained: Thin    Cord Information: 3 Vessels   Complications: Nuchal Cord With Compressions; Other(Comment) (Comment)  Cord Blood Sent?:  Yes    Blood Gases Sent?:  Yes    Placenta:  Date/Time: 3/15  3:19 AM  Removal: Spontaneous      Appearance: Normal      Measurements:  Birth Weight:      Birth Length:     Head Circumference:       Chest Circumference:      Abdominal Girth:       Other Providers:   Kashif Coleman Obstetrician;Primary Nurse;Primary [place] Nurse;Nicu Nurse;Neonatologist;Anesthesiologist;Crna;Nurse Practitioner;Midwife;Nursery Nurse           Cord Blood Results:  Information for the patient's :  Marcella Velez, Pending  [[id number]]   No results found for: Jeffery Arsalan, PCTDIG, BILI, ABORHEXT, [address]    Information for the patient's :  Lieutenant Guzman, Pending  [[id number]]   No results found for: APH, APCO2, APO2, AHCO3, ABEC, ABDC, O2ST, SITE, RSCOM, PHI, PCO2I, PO2I, HCO3I, SO2I, IBD     Information for the patient's :  Lieutenant Guzman, Pending  [[id number]]   No results found for: EPHV, PCO2V, PO2V, HCO3V, O2STV, EBDV   over 1st degree laceration without complications, delivery of head and tight nuchal cord X 2 noted, cord clamped and cut and reduced and shoulders delivered without dystocia. Spontaneous delivery of placenta. Meconium noted- infant initially to Moms chest but taken to warmer by RN. Repair in usual fashion with excellent hemostasis and cosmesis, perineal swelling noted prior to delivery.  R-V exam normal

## 2018-03-15 NOTE — PROGRESS NOTES
Admission assessment complete, see flowsheet for complete assessment. Questions answered accordingly. Denies any needs or concerns at this time. Patient resting comfortably in bed with family at bedside. Call light within reach. Encouraged to call prn-verbalizes understanding.

## 2018-03-15 NOTE — PROGRESS NOTES
Bedside report completed with Silvino WATSON RN. Plan of care reviewed with patient, verbalized understanding. Care assumed.

## 2018-03-15 NOTE — ANESTHESIA POSTPROCEDURE EVALUATION
Post-Anesthesia Evaluation and Assessment    Patient: Eun Daly MRN: [id number]  SSN: [id number]    YOB: 1990  Age: 28 y.o. Sex: female       Cardiovascular Function/Vital Signs  Visit Vitals    /64    Pulse 81    Temp 36.6 °C (97.9 °F)    Resp 16    Ht 5' 7\" (1.702 m)    Wt 102.5 kg (226 lb)    Breastfeeding Yes    BMI 35.4 kg/m2       Patient is status post epidural anesthesia for * No procedures listed *. Nausea/Vomiting: None    Postoperative hydration reviewed and adequate. Pain:  Pain Scale 1: Numeric (0 - 10) (03/15/18 0039)  Pain Intensity 1: 0 (03/15/18 0039)   Managed    Neurological Status: At baseline    Mental Status and Level of Consciousness: Awake. Pulmonary Status:   O2 Device: Room air (03/15/18 0530)   Adequate oxygenation and airway patent    Complications related to anesthesia: None    Post-anesthesia assessment completed. No concerns  The patient was satisfied with her labor epidural and denies any complications. Her lower extremities have returned to baseline neurologically.     Signed By: Lindsay Mercado MD     March 15, 2018

## 2018-03-15 NOTE — LACTATION NOTE
Discussed plan of care with patient. Discussed pt's choice of infant feeding method. Feeding options explored, including the importance of exclusive breastfeeding. Pt informed of our breastfeeding support system from from nursing staff and lactation staff during inpatient stay and following discharge. Questions and concerns addressed at this time. Pt confirms breastfeeding is her decision at this time. Vicky Martinez

## 2018-03-15 NOTE — PROGRESS NOTES
SBAR OUT Report: Mother    Verbal report given to Theresa Rodrigues RN on this patient, who is now being transferred to MIU Room 450 for routine progression of care. The patient is not wearing a green \"Anesthesia-Duramorph\" band. Report consisted of patient's Situation, Background, Assessment and Recommendations (SBAR). [place] ID bands were compared with the identification form, and verified with the patient and receiving nurse. Information from the SBAR and the [facility] Report was reviewed with the receiving nurse; opportunity for questions and clarification provided.

## 2018-03-15 NOTE — ROUTINE PROCESS
SBAR IN Report: Mother    Verbal report received from [facility] (full name & credentials) on this patient, who is now being transferred from L&D (unit) for routine progression of care. The patient is not wearing a green \"Anesthesia-Duramorph\" band. Report consisted of patient's Situation, Background, Assessment and Recommendations (SBAR). [place] ID bands were compared with the identification form, and verified with the patient and transferring nurse. Information from the Procedure Summary and the [place] Report was reviewed with the transferring nurse; opportunity for questions and clarification provided.

## 2018-03-15 NOTE — ANESTHESIA PREPROCEDURE EVALUATION
Anesthetic History   No history of anesthetic complications            Review of Systems / Medical History  Pertinent labs reviewed    Pulmonary            Asthma (exercise induced)        Neuro/Psych         Headaches     Cardiovascular      Valvular problems/murmurs (h/o murmur)            Exercise tolerance: >4 METS     GI/Hepatic/Renal  Within defined limits              Endo/Other        Obesity     Other Findings              Physical Exam    Airway  Mallampati: II  TM Distance: 4 - 6 cm  Neck ROM: normal range of motion   Mouth opening: Normal     Cardiovascular  Regular rate and rhythm,  S1 and S2 normal,  no murmur, click, rub, or gallop             Dental  No notable dental hx       Pulmonary  Breath sounds clear to auscultation               Abdominal  GI exam deferred       Other Findings            Anesthetic Plan    ASA: 2  Anesthesia type: epidural            Anesthetic plan and risks discussed with: Patient and Spouse

## 2018-03-15 NOTE — ANESTHESIA PROCEDURE NOTES
Epidural Block    Performed by: Arsenio Mayen  Authorized by: Jorge URIOSTEGUI     Pre-Procedure  Indication: procedure for pain and labor epidural    Preanesthetic Checklist: patient identified, risks and benefits discussed, anesthesia consent, patient being monitored, timeout performed and anesthesia consent    Timeout Time: 23:57        Epidural:   Patient position:  Seated  Prep region:  Lumbar  Prep: Chlorhexidine    Location:  L3-4    Needle and Epidural Catheter:   Needle Type:  Tuohy  Needle Gauge:  17 G  Injection Technique:  Loss of resistance using air  Attempts:  1  Catheter Size:  19 G  Catheter at Skin Depth (cm):  11  Depth in Epidural Space (cm):  5  Events: no blood with aspiration, no cerebrospinal fluid with aspiration, no paresthesia and negative aspiration test    Test Dose:  Lidocaine 1.5% w/ epi and negative    Assessment:   Catheter Secured:  Tegaderm and tape  Insertion:  Uncomplicated  Patient tolerance:  Patient tolerated the procedure well with no immediate complications

## 2018-03-15 NOTE — PROGRESS NOTES
2347 Dr. Nicolette Carey at bedside for an epidural placement. Patient sitting up on side of bed for an epidural placement. 0007 Test dose per Dr. Nicolette Carey, see anesthesia record for dosing. BPs per flowsheet. 0013 Right hip tilt, toco and ultrasound adjusted.

## 2018-03-15 NOTE — PROGRESS NOTES
Dr Al Pacheco in  Room SVE 5-6/90/-1 will admit to 432 Patient request to walk while waiting on epidural

## 2018-03-15 NOTE — PROGRESS NOTES
Patient admitted to room 432 for labor. Oriented to room and bed. EFM/TOCO tested and applied. Abdomen palpated soft and non-tender. +FM.

## 2018-03-15 NOTE — H&P
History & Physical    Name: Eun Daly MRN: [id number]  SSN: [id number]    YOB: 1990  Age: 28 y.o. Sex: female      CC: uterine ctx  Subjective:Pt presents in labor. She denies VB or LOF. Estimated Date of Delivery: 3/16/18  OB History    Para Term  AB Living   2 1 1   1   SAB TAB Ectopic Molar Multiple Live Births              # Outcome Date GA Lbr Oni/2nd Weight Sex Delivery Anes PTL Lv   2 Current            1 Term 16 38w4d  2.693 kg F Vag-Spont EPIDURAL AN N       Complications: Preeclampsia,Nuchal cord          Ms. Marichuy Mercado is seen with pregnancy at 39w5d for active labor. Prenatal course was normal.  the patients states that the baby moves as usual   Please see prenatal records for details. Past Medical History:   Diagnosis Date    Asthma     exercise induced    Headache     Heart abnormality     heart murmur    Heart murmur     patient states is benign    Hypertension     Other ill-defined conditions(799.89)     Mono    PCOS (polycystic ovarian syndrome) 2014    Polycystic disease, ovaries     Preeclampsia     UTI (urinary tract infection)      Past Surgical History:   Procedure Laterality Date    HX WISDOM TEETH EXTRACTION       Social History     Occupational History    Not on file.      Social History Main Topics    Smoking status: Never Smoker    Smokeless tobacco: Never Used    Alcohol use No      Comment: occasionally    Drug use: No    Sexual activity: Yes     Partners: Male     Birth control/ protection: None     Family History   Problem Relation Age of Onset    Ovarian Cancer Mother     Arthritis-osteo Mother     Migraines Mother     Cancer Mother    [employer] Bladder Disease Father     Thyroid Disease Father     Cancer Father     Mental Retardation Brother     Lung Disease Maternal Grandmother     Heart Disease Maternal Grandfather     Thyroid Cancer Paternal Grandfather        Allergies   Allergen Reactions  Penicillins Anaphylaxis     Swilling in the hands and throat     Amoxicillin Rash    Bactrim [Sulfamethoprim Ds] Nausea Only     Prior to Admission medications    Medication Sig Start Date End Date Taking? Authorizing Provider   PNV#16-Iron Fum & PS-FA-OM-3 (CONCEPT DHA) 35-1-200 mg cap Take 1 Tab by mouth daily. 10/12/15  Yes Kayla Lomax MD   aspirin delayed-release 81 mg tablet Take  by mouth daily. Historical Provider   cholecalciferol (VITAMIN D3) 1,000 unit tablet Take  by mouth daily. Historical Provider        Review of Systems:  Constitutional:No headache, fever  Cardiac:   No chest pain      Resp: No cough or shortness of breath     GI:   No nausea/vomiting, diarrhea, abdominal pain    :   No dysuria  Neuro:     No vision changes, headache      Objective:     Vitals:  Vitals:    18 2217   Weight: 102.5 kg (226 lb)   Height: 5' 7\" (1.702 m)        Physical Exam:  Patient with distress. Heart: Regular rate and rhythm  Lung: clear to auscultation throughout lung fields, no wheezes, no rales, no rhonchi and normal respiratory effort  Back: costovertebral angle tenderness absent  Abdomen: soft, nontender  Fundus: soft and non tender  Cervical Exam: 5-6cm/80%/vtx/-2  Lower Extremities: no evidence of DVT  Membranes:  Intact  Fetal Heart Rate: Baseline: 150 per minute  Variability: moderate  Accelerations: yes  Decelerations: none  Uterine contractions: regular, every 4 minutes    Prenatal Labs:   Lab Results   Component Value Date/Time    Rubella, External Immune 2017    GrBStrep, External positive 2018    HBsAg, External Negative 2017    HIV, External NR 2017    RPR, External NR 2017    Gonorrhea, External negative 2017    Chlamydia, External negative 2017         Assessment/Plan:     Ms. Marline Hoffman is a  seen with pregnancy at 39w5d for active labor.      Lab Results   Component Value Date/Time    GrBStrep, External positive 2018 Plan:     Admit for labor management; start IV Vancomycin for GBS positive status. Patient discussed with Dr. Jeni Rosa.

## 2018-03-16 PROCEDURE — 74011250637 HC RX REV CODE- 250/637: Performed by: OBSTETRICS & GYNECOLOGY

## 2018-03-16 PROCEDURE — 65270000029 HC RM PRIVATE

## 2018-03-16 RX ORDER — IBUPROFEN 600 MG/1
600 TABLET ORAL
Qty: 40 TAB | Refills: 1 | Status: SHIPPED | OUTPATIENT
Start: 2018-03-16 | End: 2021-11-15

## 2018-03-16 RX ORDER — HYDROCODONE BITARTRATE AND ACETAMINOPHEN 7.5; 325 MG/1; MG/1
1 TABLET ORAL
Qty: 15 TAB | Refills: 0 | Status: SHIPPED | OUTPATIENT
Start: 2018-03-16 | End: 2020-10-13

## 2018-03-16 RX ADMIN — IBUPROFEN 400 MG: 400 TABLET, FILM COATED ORAL at 22:02

## 2018-03-16 RX ADMIN — IBUPROFEN 400 MG: 400 TABLET, FILM COATED ORAL at 03:56

## 2018-03-16 RX ADMIN — IBUPROFEN 400 MG: 400 TABLET, FILM COATED ORAL at 15:16

## 2018-03-16 RX ADMIN — WITCH HAZEL 1 PAD: 500 SOLUTION RECTAL; TOPICAL at 20:51

## 2018-03-16 NOTE — LACTATION NOTE
This note was copied from a baby's chart. 2nd time mom, flat nipples, mom had to use nipple shield during entire breastfeeding duration with last child,  x 10 months with first child (25 months old now). Mom brought nipple shield from home and has been using it to latch this baby to the breast since birth. Baby still in first 24 hours of life. Mom reports baby latched well x 2 this morning but has been sleepy or spitting up/gaggy since then. Has attempted a few times but no latch. Baby gagging on and off now but then settled between, mom knows to sit upright and pat back, bulb syringe if needed. Baby also fussy. Mom wanted to attempt latch, use nipple shield, she brought shield from home. Assisted mom to suction nipple shield onto breast properly. Mom put baby to breast in cradle hold, baby tolerated nipple shield in mouth but did not latch, slept at the breast. Discussed signs of good latch and active effective feeding versus attempt/poor latch. Mom reports baby had good latch this morning only, has not latched since. Reviewed use of extreme caution with such early introduction of nipple shield for effective milk transfer for infant but also for direct stimulation of breast and milk supply. Instructed mom that if continued use of nipple shield desired, need to begin insurance pumping after all feeds. Mom agreeable. Pump brought in and set up. Since baby did not latch now and has not latched in over 12 hours, assisted mom to pump now. Showed mom hands on pumping technique. Mom obtained 8ml. Since baby gagging and spitting up, will not give now. Mom will give with slow flow nipple since using nipple shield to latch. Colostrum guidelines reviewed. RN updated. Needs close follow up to monitor weight gain and milk supply.

## 2018-03-16 NOTE — PROGRESS NOTES
Report received from [address] [place] [id number], [facility].

## 2018-03-16 NOTE — LACTATION NOTE

## 2018-03-16 NOTE — DISCHARGE SUMMARY
[address] OB Discharge Summary     Patient ID:  Edd Keller  [id number]  28 y.o.  1990    Admit date: 3/14/2018    Discharge date and time: No discharge date for patient encounter. Admitting Physician: Adriana Chavez MD     Discharge Physician: Shari Eldridge M.D. Admission Diagnoses: Labor;Normal labor    Problem List: Hospital - Active Problems:    Normal labor (3/14/2018)     ; Other -   Patient Active Problem List   Diagnosis Code    Encounter for immunization Z23    PCOS (polycystic ovarian syndrome) E28.2    Pregnancy Z34.90    Heart murmur R01.1    Hx of preeclampsia, prior pregnancy, currently pregnant O09.299    Maternal varicella, non-immune O09.899, Z28.3    Group beta Strep positive B95.1    Abdominal pain in pregnancy, third trimester O26.893, R10.9    Normal labor O80, Z37.9        Discharge Diagnoses: Labor;Normal labor    Hospital Course: Edd Keller had unremarkeable progressive recovery. and Eating, ambulating, and voiding in a routine manner. Significant Diagnostic Studies: No results found for this or any previous visit (from the past 24 hour(s)). Procedures: spontaneous vaginal delivery    Discharge Exam:  Visit Vitals    /65 (BP 1 Location: Right arm, BP Patient Position: At rest)    Pulse 93    Temp 97.9 °F (36.6 °C)    Resp 18    Ht 5' 7\" (1.702 m)    Wt 226 lb (102.5 kg)    LMP 05/19/2017    Breastfeeding Yes    BMI 35.4 kg/m2        Heart: regular rate and rhythm, S1, S2 normal, no murmur, click, rub or gallop  Lungs:clear to auscultation bilaterally  Extremities: normal, atraumatic, no cyanosis or edema. No DVT  Incision/episiotomy: no significant drainage, no dehiscence, no significant erythema    Patient Instructions:   Current Discharge Medication List      START taking these medications    Details   HYDROcodone-acetaminophen (NORCO) 7.5-325 mg per tablet Take 1 Tab by mouth every four (4) hours as needed.  Max Daily Amount: 6 Tabs. Indications: Pain  Qty: 15 Tab, Refills: 0    Associated Diagnoses: Normal labor      ibuprofen (MOTRIN) 600 mg tablet Take 1 Tab by mouth every six (6) hours as needed. Indications: Pain  Qty: 40 Tab, Refills: 1    Associated Diagnoses: Normal labor         CONTINUE these medications which have NOT CHANGED    Details   PNV#16-Iron Fum & PS-FA-OM-3 (CONCEPT DHA) 35-1-200 mg cap Take 1 Tab by mouth daily. Qty: 30 Cap, Refills: 11      cholecalciferol (VITAMIN D3) 1,000 unit tablet Take  by mouth daily. STOP taking these medications       aspirin delayed-release 81 mg tablet Comments:   Reason for Stopping:              Activity: physical activity is restricted per discharge instructions  Diet: resume normal diet  Wound Care: Keep wound clean and dry, as directed    Follow-up with Sukhdeep in 2 weeks.     Signed:  Stacey Cornelius MD  3/16/2018  10:02 AM

## 2018-03-16 NOTE — LACTATION NOTE
This note was copied from a baby's chart. In to check on feedings. Mom continues to use nipple shield. Pumping after some feedings. Pumped 20 mls this morning. Giving colostrum back to infant via curve tip syringe. Infant latched to left breast in cradle hold at time of visit. Good latch noted. Infant actively feeding. Good jaw movement and good breast tissue movement. Reviewed importance of pumping after feedings due to using nipple shield for good stimulation of breasts for full milk supply. Reviewed normal colostrum volumes. Reviewed baby's second night and normalcy of cluster feeding. Did supplement x1, early am, due to no void at 24 hours of age. Has had 2 voids since per parents. Encouraged to continue to give back colostrum to infant after feedings. Mom denies needs or assistance. Lactation to follow up tomorrow.

## 2018-03-16 NOTE — PROGRESS NOTES
Report received from [facility]. Patient care assumed-bedside reporting completed. Pt sleeping. No signs of distress. Spouse sleeping at bedside.

## 2018-03-16 NOTE — PROGRESS NOTES
Motrin 400 mg PO given to pt per request.  Educated pt to call out if pain medication does not help.

## 2018-03-17 VITALS
BODY MASS INDEX: 35.47 KG/M2 | HEIGHT: 67 IN | TEMPERATURE: 97.8 F | HEART RATE: 87 BPM | RESPIRATION RATE: 18 BRPM | SYSTOLIC BLOOD PRESSURE: 112 MMHG | DIASTOLIC BLOOD PRESSURE: 79 MMHG | WEIGHT: 226 LBS

## 2018-03-17 PROCEDURE — 74011250637 HC RX REV CODE- 250/637: Performed by: OBSTETRICS & GYNECOLOGY

## 2018-03-17 RX ADMIN — IBUPROFEN 400 MG: 400 TABLET, FILM COATED ORAL at 11:00

## 2018-03-17 NOTE — LACTATION NOTE
In to see mom and infant prior to discharge to home. Mom stated that infant is latching and nursing well with nippleshield. Mom is confident and has no concerns at this time. Mom stated that she has not been pumping routinely here but plans to pump more after she is discharged to home. Instructed mom to take her breast pump kit home with her. Reviewed discharge information. Mom and infant are following up with [facility] and will see lactation consultant there.

## 2018-03-17 NOTE — PROGRESS NOTES
Report received from Marilyn Duffy RN. Patient care assumed-bedside reporting completed. Pt resting quietly. Denies any needs. Spouse sleeping at bedside.

## 2018-03-17 NOTE — DISCHARGE INSTRUCTIONS
Discharge instruction to follow: Activity: Pelvis rest for 6 weeks     No heavy lifting over 15 lbs for 2 weeks     No driving for 2 weeks     No push/pull motion such as sweeping or vacuuming for 2 weeks     No tub baths for 6 weeks    Continue using the dagoberto-bottle after each void or bowel movement. If using sitz bath continue until comfortable stopping. Change sanitary pad after each urination or bowel movement. Call MD for the following:      Fever over 101 F; pain not relieved by medication; foul smelling vaginal discharge or an increase in vaginal bleeding. Take medication as prescribed. Follow up with MD as order. DISCHARGE SUMMARY from Nurse    What to do at Home:  Recommended activity: Activity as tolerated,     *  Please give a list of your current medications to your Primary Care Provider. *  Please update this list whenever your medications are discontinued, doses are      changed, or new medications (including over-the-counter products) are added. *  Please carry medication information at all times in case of emergency situations. These are general instructions for a healthy lifestyle:    No smoking/ No tobacco products/ Avoid exposure to second hand smoke  Surgeon General's Warning:  Quitting smoking now greatly reduces serious risk to your health. Obesity, smoking, and sedentary lifestyle greatly increases your risk for illness    A healthy diet, regular physical exercise & weight monitoring are important for maintaining a healthy lifestyle    You may be retaining fluid if you have a history of heart failure or if you experience any of the following symptoms:  Weight gain of 3 pounds or more overnight or 5 pounds in a week, increased swelling in our hands or feet or shortness of breath while lying flat in bed. Please call your doctor as soon as you notice any of these symptoms; do not wait until your next office visit.     Recognize signs and symptoms of STROKE:    F-face looks uneven    A-arms unable to move or move unevenly    S-speech slurred or non-existent    T-time-call 911 as soon as signs and symptoms begin-DO NOT go       Back to bed or wait to see if you get better-TIME IS BRAIN. Warning Signs of HEART ATTACK     Call 911 if you have these symptoms:   Chest discomfort. Most heart attacks involve discomfort in the center of the chest that lasts more than a few minutes, or that goes away and comes back. It can feel like uncomfortable pressure, squeezing, fullness, or pain.  Discomfort in other areas of the upper body. Symptoms can include pain or discomfort in one or both arms, the back, neck, jaw, or stomach.  Shortness of breath with or without chest discomfort.  Other signs may include breaking out in a cold sweat, nausea, or lightheadedness. Don't wait more than five minutes to call 911 - MINUTES MATTER! Fast action can save your life. Calling 911 is almost always the fastest way to get lifesaving treatment. Emergency Medical Services staff can begin treatment when they arrive -- up to an hour sooner than if someone gets to the hospital by car. The discharge information has been reviewed with the patient. The patient verbalized understanding. Discharge medications reviewed with the patient and appropriate educational materials and side effects teaching were provided. ___________________________________________________________________________________________________________________________________       Vaginal Childbirth: Care Instructions  Your Care Instructions    Your body will slowly heal in the next few weeks. It is easy to get too tired and overwhelmed during the first weeks after your baby is born. Changes in your hormones can shift your mood without warning. You may find it hard to meet the extra demands on your energy and time. Take it easy on yourself. Follow-up care is a key part of your treatment and safety.  Be sure to make and go to all appointments, and call your doctor if you are having problems. It's also a good idea to know your test results and keep a list of the medicines you take. How can you care for yourself at home? · Vaginal bleeding and cramps  ¨ After delivery, you will have a bloody discharge from the vagina. This will turn pink within a week and then white or yellow after about 10 days. It may last for 2 to 4 weeks or longer, until the uterus has healed. Use pads instead of tampons until you stop bleeding. ¨ Do not worry if you pass some blood clots, as long as they are smaller than a golf ball. If you have a tear or stitches in your vaginal area, change the pad at least every 4 hours to prevent soreness and infection. ¨ You may have cramps for the first few days after childbirth. These are normal and occur as the uterus shrinks to normal size. Take an over-the-counter pain medicine, such as acetaminophen (Tylenol), ibuprofen (Advil, Motrin), or naproxen (Aleve), for cramps. Read and follow all instructions on the label. Do not take aspirin, because it can cause more bleeding. ¨ Do not take two or more pain medicines at the same time unless the doctor told you to. Many pain medicines have acetaminophen, which is Tylenol. Too much acetaminophen (Tylenol) can be harmful. · Stitches  ¨ If you have stitches, they will dissolve on their own and do not need to be removed. Follow your doctor's instructions for cleaning the stitched area. ¨ Put ice or a cold pack on your painful area for 10 to 20 minutes at a time, several times a day, for the first few days. Put a thin cloth between the ice and your skin. ¨ Sit in a few inches of warm water (sitz bath) 3 times a day and after bowel movements. The warm water helps with pain and itching. If you do not have a tub, a warm shower might help. · Breast fullness  ¨ Your breasts may overfill (engorge) in the first few days after delivery.  To help milk flow and to relieve pain, warm your breasts in the shower or by using warm, moist towels before nursing. ¨ If you are not nursing, do not put warmth on your breasts or touch your breasts. Wear a tight bra or sports bra and use ice until the fullness goes away. This usually takes 2 to 3 days. ¨ Put ice or a cold pack on your breast after nursing to reduce swelling and pain. Put a thin cloth between the ice and your skin. · Activity  ¨ Eat a balanced diet. Do not try to lose weight by cutting calories. Keep taking your prenatal vitamins, or take a multivitamin. ¨ Get as much rest as you can. Try to take naps when your baby sleeps during the day. ¨ Get some exercise every day. But do not do any heavy exercise until your doctor says it is okay. ¨ Wait until you are healed (about 4 to 6 weeks) before you have sexual intercourse. Your doctor will tell you when it is okay to have sex. ¨ Talk to your doctor about birth control. You can get pregnant even before your period returns. Also, you can get pregnant while you are breastfeeding. · Mental health  ¨ It is normal to have some sadness, anxiety, sleeplessness, and mood swings after you go home. If you feel upset or hopeless for more than a few days or are having trouble doing the things you need to do, talk to your doctor. · Constipation and hemorrhoids  ¨ Drink plenty of fluids, enough so that your urine is light yellow or clear like water. If you have kidney, heart, or liver disease and have to limit fluids, talk with your doctor before you increase the amount of fluids you drink. ¨ Eat plenty of fiber each day. Have a bran muffin or bran cereal for breakfast, and try eating a piece of fruit for a mid-afternoon snack. ¨ For painful, itchy hemorrhoids, put ice or a cold pack on the area several times a day for 10 minutes at a time. Follow this by putting a warm compress on the area for another 10 to 20 minutes or by sitting in a shallow, warm bath. When should you call for help?   Call [phone] anytime you think you may need emergency care. For example, call if:  ? · You passed out (lost consciousness). ?Call your doctor now or seek immediate medical care if:  ? · You have severe vaginal bleeding. ? · You are dizzy or lightheaded, or you feel like you may faint. ? · You have a fever. ? · You have new or more pain in your belly or pelvis. ? Watch closely for changes in your health, and be sure to contact your doctor if:  ? · Your vaginal bleeding seems to be getting heavier. ? · You have new or worse vaginal discharge. ? · You feel sad, anxious, or hopeless for more than a few days. ? · You do not get better as expected. Where can you learn more? Go to http://jonathan-yann.info/. Enter L335 in the search box to learn more about \"Vaginal Childbirth: Care Instructions. \"  Current as of: March 16, 2017  Content Version: 11.4  © 0545-5023 [employer]. Care instructions adapted under license by [employer] (which disclaims liability or warranty for this information). If you have questions about a medical condition or this instruction, always ask your healthcare professional. [address] any warranty or liability for your use of this information.

## 2018-03-19 ENCOUNTER — TELEPHONE (OUTPATIENT)
Dept: CASE MANAGEMENT | Age: 28
End: 2018-03-19

## 2018-03-19 NOTE — TELEPHONE ENCOUNTER
Phone call to patient at [phone] at the request of RN due to history of postpartum depression. Discussed signs/symptoms of postpartum depression/anxiety that patient experienced after delivery of her daughter in 2016. She states that onset was \"right away due to her traumatic entry and lasted for 6-8 weeks. \"  Patient described symptoms that included intrusive thoughts. Patient shared these concerns with her , but she was reluctant to tell anyone else \"because I didn't know what would happen. \"   normalized the impact of intrusive thoughts and assisted patient with identifying these as thoughts and not as intentions. Patient states that she has a prescription for Zoloft that her  has had filled. She does not plan to initiate medication unless either she or her  feel that it is warranted. Education provided on postpartum depression/anxiety. Patient was informed of local therapy practice that accepts her insurance ([facility]). Educational packet on postpartum depression information/resources mailed to patient's home. Patient also provided with 's contact information for any needs/questions.       Daina Trammell, [address] [facility]

## 2018-05-16 PROBLEM — O09.899 MATERNAL VARICELLA, NON-IMMUNE: Status: RESOLVED | Noted: 2017-07-26 | Resolved: 2018-05-16

## 2018-05-16 PROBLEM — Z28.39 MATERNAL VARICELLA, NON-IMMUNE: Status: RESOLVED | Noted: 2017-07-26 | Resolved: 2018-05-16

## 2018-05-16 PROBLEM — Z37.9 NORMAL LABOR: Status: RESOLVED | Noted: 2018-03-14 | Resolved: 2018-05-16

## 2018-05-16 PROBLEM — O09.299 HX OF PREECLAMPSIA, PRIOR PREGNANCY, CURRENTLY PREGNANT: Status: RESOLVED | Noted: 2017-07-24 | Resolved: 2018-05-16

## 2018-05-16 PROBLEM — O26.893 ABDOMINAL PAIN IN PREGNANCY, THIRD TRIMESTER: Status: RESOLVED | Noted: 2018-03-13 | Resolved: 2018-05-16

## 2018-05-16 PROBLEM — R10.9 ABDOMINAL PAIN IN PREGNANCY, THIRD TRIMESTER: Status: RESOLVED | Noted: 2018-03-13 | Resolved: 2018-05-16

## 2018-05-16 PROBLEM — B95.1 GROUP BETA STREP POSITIVE: Status: RESOLVED | Noted: 2018-02-19 | Resolved: 2018-05-16

## 2018-05-16 PROBLEM — Z97.5 IUD (INTRAUTERINE DEVICE) IN PLACE: Status: ACTIVE | Noted: 2018-05-16
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
## 2018-09-26 ENCOUNTER — APPOINTMENT (RX ONLY)
Dept: URBAN - METROPOLITAN AREA CLINIC 24 | Facility: CLINIC | Age: 28
Setting detail: DERMATOLOGY
End: 2018-09-26

## 2018-09-26 DIAGNOSIS — D22 MELANOCYTIC NEVI: ICD-10-CM

## 2018-09-26 DIAGNOSIS — L20.89 OTHER ATOPIC DERMATITIS: ICD-10-CM

## 2018-09-26 PROBLEM — L20.84 INTRINSIC (ALLERGIC) ECZEMA: Status: ACTIVE | Noted: 2018-09-26

## 2018-09-26 PROBLEM — D22.5 MELANOCYTIC NEVI OF TRUNK: Status: ACTIVE | Noted: 2018-09-26

## 2018-09-26 PROCEDURE — 99202 OFFICE O/P NEW SF 15 MIN: CPT

## 2018-09-26 PROCEDURE — ? TREATMENT REGIMEN

## 2018-09-26 PROCEDURE — ? PRESCRIPTION

## 2018-09-26 PROCEDURE — ? REFERRAL

## 2018-09-26 PROCEDURE — ? COUNSELING

## 2018-09-26 RX ORDER — BETAMETHASONE DIPROPIONATE 0.5 MG/G
OINTMENT TOPICAL
Qty: 1 | Refills: 1 | Status: ERX | COMMUNITY
Start: 2018-09-26

## 2018-09-26 RX ADMIN — BETAMETHASONE DIPROPIONATE: 0.5 OINTMENT TOPICAL at 19:14

## 2018-09-26 ASSESSMENT — LOCATION DETAILED DESCRIPTION DERM
LOCATION DETAILED: LEFT THENAR EMINENCE
LOCATION DETAILED: RIGHT PROXIMAL DORSAL RING FINGER
LOCATION DETAILED: LEFT MID-UPPER BACK
LOCATION DETAILED: RIGHT RADIAL DORSAL HAND
LOCATION DETAILED: LEFT SUPERIOR LATERAL LOWER BACK
LOCATION DETAILED: LEFT PROXIMAL DORSAL RING FINGER
LOCATION DETAILED: LEFT RADIAL DORSAL HAND
LOCATION DETAILED: RIGHT RIB CAGE
LOCATION DETAILED: RIGHT SUPERIOR UPPER BACK
LOCATION DETAILED: RIGHT THENAR EMINENCE

## 2018-09-26 ASSESSMENT — LOCATION ZONE DERM
LOCATION ZONE: TRUNK
LOCATION ZONE: HAND
LOCATION ZONE: FINGER

## 2018-09-26 ASSESSMENT — LOCATION SIMPLE DESCRIPTION DERM
LOCATION SIMPLE: RIGHT HAND
LOCATION SIMPLE: LEFT UPPER BACK
LOCATION SIMPLE: RIGHT RING FINGER
LOCATION SIMPLE: LEFT RING FINGER
LOCATION SIMPLE: RIGHT UPPER BACK
LOCATION SIMPLE: LEFT HAND
LOCATION SIMPLE: LEFT LOWER BACK
LOCATION SIMPLE: ABDOMEN

## 2018-09-26 NOTE — HPI: RASH
How Severe Is Your Rash?: moderate
Is This A New Presentation, Or A Follow-Up?: Rash
Additional History: Medicated lotion, oils, working hands lotion, epsom salt

## 2019-04-01 ENCOUNTER — APPOINTMENT (RX ONLY)
Dept: URBAN - METROPOLITAN AREA CLINIC 24 | Facility: CLINIC | Age: 29
Setting detail: DERMATOLOGY
End: 2019-04-01

## 2019-04-01 DIAGNOSIS — L20.89 OTHER ATOPIC DERMATITIS: ICD-10-CM

## 2019-04-01 PROBLEM — L20.84 INTRINSIC (ALLERGIC) ECZEMA: Status: ACTIVE | Noted: 2019-04-01

## 2019-04-01 PROBLEM — M12.9 ARTHROPATHY, UNSPECIFIED: Status: ACTIVE | Noted: 2019-04-01

## 2019-04-01 PROCEDURE — 99212 OFFICE O/P EST SF 10 MIN: CPT

## 2019-04-01 PROCEDURE — ? COUNSELING

## 2019-04-01 PROCEDURE — ? TREATMENT REGIMEN

## 2019-04-01 PROCEDURE — ? PRESCRIPTION

## 2019-04-01 RX ORDER — DESOXIMETASONE 2.5 MG/G
OINTMENT TOPICAL
Qty: 1 | Refills: 3 | Status: ERX | COMMUNITY
Start: 2019-04-01

## 2019-04-01 RX ADMIN — DESOXIMETASONE: 2.5 OINTMENT TOPICAL at 19:42

## 2019-04-01 ASSESSMENT — LOCATION SIMPLE DESCRIPTION DERM
LOCATION SIMPLE: LEFT HAND
LOCATION SIMPLE: RIGHT RING FINGER
LOCATION SIMPLE: RIGHT HAND
LOCATION SIMPLE: LEFT RING FINGER

## 2019-04-01 ASSESSMENT — LOCATION DETAILED DESCRIPTION DERM
LOCATION DETAILED: LEFT THENAR EMINENCE
LOCATION DETAILED: RIGHT PROXIMAL DORSAL RING FINGER
LOCATION DETAILED: RIGHT THENAR EMINENCE
LOCATION DETAILED: LEFT PROXIMAL DORSAL RING FINGER

## 2019-04-01 ASSESSMENT — LOCATION ZONE DERM
LOCATION ZONE: HAND
LOCATION ZONE: FINGER

## 2019-12-09 ENCOUNTER — APPOINTMENT (RX ONLY)
Dept: URBAN - METROPOLITAN AREA CLINIC 23 | Facility: CLINIC | Age: 29
Setting detail: DERMATOLOGY
End: 2019-12-09

## 2019-12-09 DIAGNOSIS — L20.89 OTHER ATOPIC DERMATITIS: ICD-10-CM

## 2019-12-09 PROBLEM — L55.1 SUNBURN OF SECOND DEGREE: Status: ACTIVE | Noted: 2019-12-09

## 2019-12-09 PROBLEM — L20.84 INTRINSIC (ALLERGIC) ECZEMA: Status: ACTIVE | Noted: 2019-12-09

## 2019-12-09 PROCEDURE — ? TREATMENT REGIMEN

## 2019-12-09 PROCEDURE — 99212 OFFICE O/P EST SF 10 MIN: CPT

## 2019-12-09 PROCEDURE — ? PRESCRIPTION

## 2019-12-09 PROCEDURE — ? COUNSELING

## 2019-12-09 RX ORDER — DESOXIMETASONE 2.5 MG/G
OINTMENT TOPICAL
Qty: 1 | Refills: 8 | Status: ERX

## 2019-12-09 ASSESSMENT — LOCATION ZONE DERM
LOCATION ZONE: HAND
LOCATION ZONE: FINGER

## 2019-12-09 ASSESSMENT — LOCATION SIMPLE DESCRIPTION DERM
LOCATION SIMPLE: LEFT RING FINGER
LOCATION SIMPLE: LEFT HAND
LOCATION SIMPLE: RIGHT RING FINGER
LOCATION SIMPLE: RIGHT HAND

## 2019-12-09 ASSESSMENT — LOCATION DETAILED DESCRIPTION DERM
LOCATION DETAILED: RIGHT PROXIMAL DORSAL RING FINGER
LOCATION DETAILED: RIGHT THENAR EMINENCE
LOCATION DETAILED: LEFT PROXIMAL DORSAL RING FINGER
LOCATION DETAILED: LEFT THENAR EMINENCE

## 2020-10-22 ENCOUNTER — HOSPITAL ENCOUNTER (OUTPATIENT)
Dept: MAMMOGRAPHY | Age: 30
Discharge: HOME | End: 2020-10-22
Attending: OBSTETRICS & GYNECOLOGY
Payer: COMMERCIAL

## 2020-10-22 DIAGNOSIS — N64.4 BREAST PAIN, LEFT: ICD-10-CM

## 2020-10-22 DIAGNOSIS — N64.4 MASTODYNIA OF LEFT BREAST: ICD-10-CM

## 2020-10-22 PROCEDURE — 76642 ULTRASOUND BREAST LIMITED: CPT

## 2020-10-22 PROCEDURE — 77066 DX MAMMO INCL CAD BI: CPT

## 2020-12-14 ENCOUNTER — APPOINTMENT (RX ONLY)
Dept: URBAN - METROPOLITAN AREA CLINIC 24 | Facility: CLINIC | Age: 30
Setting detail: DERMATOLOGY
End: 2020-12-14

## 2020-12-14 DIAGNOSIS — L70.0 ACNE VULGARIS: ICD-10-CM

## 2020-12-14 DIAGNOSIS — L20.89 OTHER ATOPIC DERMATITIS: ICD-10-CM | Status: WELL CONTROLLED

## 2020-12-14 PROBLEM — L20.84 INTRINSIC (ALLERGIC) ECZEMA: Status: ACTIVE | Noted: 2020-12-14

## 2020-12-14 PROCEDURE — ? COUNSELING

## 2020-12-14 PROCEDURE — ? PRESCRIPTION

## 2020-12-14 PROCEDURE — ? TREATMENT REGIMEN

## 2020-12-14 PROCEDURE — 99213 OFFICE O/P EST LOW 20 MIN: CPT

## 2020-12-14 RX ORDER — DESOXIMETASONE 2.5 MG/G
OINTMENT TOPICAL
Qty: 1 | Refills: 9 | Status: ERX

## 2020-12-14 ASSESSMENT — LOCATION DETAILED DESCRIPTION DERM
LOCATION DETAILED: LEFT THENAR EMINENCE
LOCATION DETAILED: RIGHT THENAR EMINENCE
LOCATION DETAILED: RIGHT PROXIMAL DORSAL RING FINGER
LOCATION DETAILED: RIGHT POSTERIOR SHOULDER
LOCATION DETAILED: LEFT PROXIMAL DORSAL RING FINGER
LOCATION DETAILED: LEFT POSTERIOR SHOULDER

## 2020-12-14 ASSESSMENT — LOCATION SIMPLE DESCRIPTION DERM
LOCATION SIMPLE: LEFT HAND
LOCATION SIMPLE: RIGHT HAND
LOCATION SIMPLE: LEFT RING FINGER
LOCATION SIMPLE: LEFT SHOULDER
LOCATION SIMPLE: RIGHT SHOULDER
LOCATION SIMPLE: RIGHT RING FINGER

## 2020-12-14 ASSESSMENT — LOCATION ZONE DERM
LOCATION ZONE: HAND
LOCATION ZONE: FINGER
LOCATION ZONE: ARM

## 2020-12-14 NOTE — PROCEDURE: TREATMENT REGIMEN
Detail Level: Zone
Otc Regimen: Scent free products, Vaseline and cover with gloves at night
Otc Regimen: Benzoyl Peroxide wash

## 2020-12-14 NOTE — PROCEDURE: COUNSELING
Topical Retinoid Pregnancy And Lactation Text: This medication is Pregnancy Category C. It is unknown if this medication is excreted in breast milk.
Erythromycin Counseling:  I discussed with the patient the risks of erythromycin including but not limited to GI upset, allergic reaction, drug rash, diarrhea, increase in liver enzymes, and yeast infections.
Doxycycline Counseling:  Patient counseled regarding possible photosensitivity and increased risk for sunburn.  Patient instructed to avoid sunlight, if possible.  When exposed to sunlight, patients should wear protective clothing, sunglasses, and sunscreen.  The patient was instructed to call the office immediately if the following severe adverse effects occur:  hearing changes, easy bruising/bleeding, severe headache, or vision changes.  The patient verbalized understanding of the proper use and possible adverse effects of doxycycline.  All of the patient's questions and concerns were addressed.
Doxycycline Pregnancy And Lactation Text: This medication is Pregnancy Category D and not consider safe during pregnancy. It is also excreted in breast milk but is considered safe for shorter treatment courses.
Include Pregnancy/Lactation Warning?: No
Detail Level: Generalized
Azithromycin Pregnancy And Lactation Text: This medication is considered safe during pregnancy and is also secreted in breast milk.
Tetracycline Counseling: Patient counseled regarding possible photosensitivity and increased risk for sunburn.  Patient instructed to avoid sunlight, if possible.  When exposed to sunlight, patients should wear protective clothing, sunglasses, and sunscreen.  The patient was instructed to call the office immediately if the following severe adverse effects occur:  hearing changes, easy bruising/bleeding, severe headache, or vision changes.  The patient verbalized understanding of the proper use and possible adverse effects of tetracycline.  All of the patient's questions and concerns were addressed. Patient understands to avoid pregnancy while on therapy due to potential birth defects.
Dapsone Pregnancy And Lactation Text: This medication is Pregnancy Category C and is not considered safe during pregnancy or breast feeding.
Topical Retinoid counseling:  Patient advised to apply a pea-sized amount only at bedtime and wait 30 minutes after washing their face before applying.  If too drying, patient may add a non-comedogenic moisturizer. The patient verbalized understanding of the proper use and possible adverse effects of retinoids.  All of the patient's questions and concerns were addressed.
Sarecycline Counseling: Patient advised regarding possible photosensitivity and discoloration of the teeth, skin, lips, tongue and gums.  Patient instructed to avoid sunlight, if possible.  When exposed to sunlight, patients should wear protective clothing, sunglasses, and sunscreen.  The patient was instructed to call the office immediately if the following severe adverse effects occur:  hearing changes, easy bruising/bleeding, severe headache, or vision changes.  The patient verbalized understanding of the proper use and possible adverse effects of sarecycline.  All of the patient's questions and concerns were addressed.
Isotretinoin Pregnancy And Lactation Text: This medication is Pregnancy Category X and is considered extremely dangerous during pregnancy. It is unknown if it is excreted in breast milk.
Birth Control Pills Counseling: Birth Control Pill Counseling: I discussed with the patient the potential side effects of OCPs including but not limited to increased risk of stroke, heart attack, thrombophlebitis, deep venous thrombosis, hepatic adenomas, breast changes, GI upset, headaches, and depression.  The patient verbalized understanding of the proper use and possible adverse effects of OCPs. All of the patient's questions and concerns were addressed.
Birth Control Pills Pregnancy And Lactation Text: This medication should be avoided if pregnant and for the first 30 days post-partum.
Spironolactone Pregnancy And Lactation Text: This medication can cause feminization of the male fetus and should be avoided during pregnancy. The active metabolite is also found in breast milk.
Azithromycin Counseling:  I discussed with the patient the risks of azithromycin including but not limited to GI upset, allergic reaction, drug rash, diarrhea, and yeast infections.
Detail Level: Zone
High Dose Vitamin A Pregnancy And Lactation Text: High dose vitamin A therapy is contraindicated during pregnancy and breast feeding.
Minocycline Counseling: Patient advised regarding possible photosensitivity and discoloration of the teeth, skin, lips, tongue and gums.  Patient instructed to avoid sunlight, if possible.  When exposed to sunlight, patients should wear protective clothing, sunglasses, and sunscreen.  The patient was instructed to call the office immediately if the following severe adverse effects occur:  hearing changes, easy bruising/bleeding, severe headache, or vision changes.  The patient verbalized understanding of the proper use and possible adverse effects of minocycline.  All of the patient's questions and concerns were addressed.
Topical Sulfur Applications Counseling: Topical Sulfur Counseling: Patient counseled that this medication may cause skin irritation or allergic reactions.  In the event of skin irritation, the patient was advised to reduce the amount of the drug applied or use it less frequently.   The patient verbalized understanding of the proper use and possible adverse effects of topical sulfur application.  All of the patient's questions and concerns were addressed.
Dapsone Counseling: I discussed with the patient the risks of dapsone including but not limited to hemolytic anemia, agranulocytosis, rashes, methemoglobinemia, kidney failure, peripheral neuropathy, headaches, GI upset, and liver toxicity.  Patients who start dapsone require monitoring including baseline LFTs and weekly CBCs for the first month, then every month thereafter.  The patient verbalized understanding of the proper use and possible adverse effects of dapsone.  All of the patient's questions and concerns were addressed.
Erythromycin Pregnancy And Lactation Text: This medication is Pregnancy Category B and is considered safe during pregnancy. It is also excreted in breast milk.
Minocycline Pregnancy And Lactation Text: This medication is Pregnancy Category D and not consider safe during pregnancy. It is also excreted in breast milk.
High Dose Vitamin A Counseling: Side effects reviewed, pt to contact office should one occur.
Spironolactone Counseling: Patient advised regarding risks of diarrhea, abdominal pain, hyperkalemia, birth defects (for female patients), liver toxicity and renal toxicity. The patient may need blood work to monitor liver and kidney function and potassium levels while on therapy. The patient verbalized understanding of the proper use and possible adverse effects of spironolactone.  All of the patient's questions and concerns were addressed.
Bactrim Pregnancy And Lactation Text: This medication is Pregnancy Category D and is known to cause fetal risk.  It is also excreted in breast milk.
Topical Clindamycin Pregnancy And Lactation Text: This medication is Pregnancy Category B and is considered safe during pregnancy. It is unknown if it is excreted in breast milk.
Tazorac Counseling:  Patient advised that medication is irritating and drying.  Patient may need to apply sparingly and wash off after an hour before eventually leaving it on overnight.  The patient verbalized understanding of the proper use and possible adverse effects of tazorac.  All of the patient's questions and concerns were addressed.
Topical Sulfur Applications Pregnancy And Lactation Text: This medication is Pregnancy Category C and has an unknown safety profile during pregnancy. It is unknown if this topical medication is excreted in breast milk.
Topical Clindamycin Counseling: Patient counseled that this medication may cause skin irritation or allergic reactions.  In the event of skin irritation, the patient was advised to reduce the amount of the drug applied or use it less frequently.   The patient verbalized understanding of the proper use and possible adverse effects of clindamycin.  All of the patient's questions and concerns were addressed.
Tazorac Pregnancy And Lactation Text: This medication is not safe during pregnancy. It is unknown if this medication is excreted in breast milk.
Bactrim Counseling:  I discussed with the patient the risks of sulfa antibiotics including but not limited to GI upset, allergic reaction, drug rash, diarrhea, dizziness, photosensitivity, and yeast infections.  Rarely, more serious reactions can occur including but not limited to aplastic anemia, agranulocytosis, methemoglobinemia, blood dyscrasias, liver or kidney failure, lung infiltrates or desquamative/blistering drug rashes.
Benzoyl Peroxide Counseling: Patient counseled that medicine may cause skin irritation and bleach clothing.  In the event of skin irritation, the patient was advised to reduce the amount of the drug applied or use it less frequently.   The patient verbalized understanding of the proper use and possible adverse effects of benzoyl peroxide.  All of the patient's questions and concerns were addressed.
Benzoyl Peroxide Pregnancy And Lactation Text: This medication is Pregnancy Category C. It is unknown if benzoyl peroxide is excreted in breast milk.
Isotretinoin Counseling: Patient should get monthly blood tests, not donate blood, not drive at night if vision affected, not share medication, and not undergo elective surgery for 6 months after tx completed. Side effects reviewed, pt to contact office should one occur.

## 2021-12-15 ENCOUNTER — APPOINTMENT (RX ONLY)
Dept: URBAN - METROPOLITAN AREA CLINIC 24 | Facility: CLINIC | Age: 31
Setting detail: DERMATOLOGY
End: 2021-12-15

## 2021-12-15 DIAGNOSIS — L20.89 OTHER ATOPIC DERMATITIS: ICD-10-CM

## 2021-12-15 DIAGNOSIS — L70.0 ACNE VULGARIS: ICD-10-CM

## 2021-12-15 PROBLEM — L20.84 INTRINSIC (ALLERGIC) ECZEMA: Status: ACTIVE | Noted: 2021-12-15

## 2021-12-15 PROCEDURE — ? PRESCRIPTION

## 2021-12-15 PROCEDURE — 99214 OFFICE O/P EST MOD 30 MIN: CPT

## 2021-12-15 PROCEDURE — ? TREATMENT REGIMEN

## 2021-12-15 PROCEDURE — ? COUNSELING

## 2021-12-15 RX ORDER — CLINDAMYCIN PHOSPHATE 10 MG/ML
SOLUTION TOPICAL
Qty: 60 | Refills: 8 | Status: ERX | COMMUNITY
Start: 2021-12-15

## 2021-12-15 RX ORDER — DESOXIMETASONE 2.5 MG/G
OINTMENT TOPICAL
Qty: 100 | Refills: 6 | Status: ERX | COMMUNITY
Start: 2021-12-15

## 2021-12-15 RX ADMIN — CLINDAMYCIN PHOSPHATE: 10 SOLUTION TOPICAL at 00:00

## 2021-12-15 RX ADMIN — DESOXIMETASONE: 2.5 OINTMENT TOPICAL at 00:00

## 2021-12-15 ASSESSMENT — LOCATION SIMPLE DESCRIPTION DERM
LOCATION SIMPLE: RIGHT HAND
LOCATION SIMPLE: LEFT HAND
LOCATION SIMPLE: RIGHT RING FINGER
LOCATION SIMPLE: LEFT RING FINGER

## 2021-12-15 ASSESSMENT — LOCATION DETAILED DESCRIPTION DERM
LOCATION DETAILED: RIGHT THENAR EMINENCE
LOCATION DETAILED: RIGHT PROXIMAL DORSAL RING FINGER
LOCATION DETAILED: LEFT PROXIMAL DORSAL RING FINGER
LOCATION DETAILED: LEFT THENAR EMINENCE

## 2021-12-15 ASSESSMENT — LOCATION ZONE DERM
LOCATION ZONE: FINGER
LOCATION ZONE: HAND

## 2021-12-15 NOTE — PROCEDURE: TREATMENT REGIMEN
Otc Regimen: Benzoyl Peroxide wash
Detail Level: Zone
Otc Regimen: Scent free products, Vaseline and cover with gloves at night

## 2022-03-19 PROBLEM — Z97.5 IUD (INTRAUTERINE DEVICE) IN PLACE: Status: ACTIVE | Noted: 2018-05-16

## 2022-03-19 PROBLEM — R01.1 HEART MURMUR: Status: ACTIVE | Noted: 2017-07-24

## 2022-07-05 ENCOUNTER — TELEMEDICINE (OUTPATIENT)
Dept: PRIMARY CARE CLINIC | Facility: CLINIC | Age: 32
End: 2022-07-05
Payer: COMMERCIAL

## 2022-07-05 DIAGNOSIS — U07.1 COVID: Primary | ICD-10-CM

## 2022-07-05 DIAGNOSIS — Z87.42 HISTORY OF POLYCYSTIC OVARIAN DISEASE: ICD-10-CM

## 2022-07-05 PROCEDURE — 99441 PR PHYS/QHP TELEPHONE EVALUATION 5-10 MIN: CPT | Performed by: FAMILY MEDICINE

## 2022-07-05 NOTE — PROGRESS NOTES
Gardenia Parham is a 34 y.o. female evaluated via audio-only technology on 7/5/2022 for No chief complaint on file. Yon Rene Apparently tested positive for COVID last week and feeling better. Denies any fever or any acute symptoms. Initially made an appointment if her  needed a treatment. History of polycystic ovary disease denies any problems at present I do not want to talk about it or any risk of pregnancy. No other medical problems concerns. She did not take COVID vaccination. Review of system otherwise negative for any fever any problems at present  Assessment & Plan:   COVID  History of polycystic ovarian disease    No follow-ups on file. Subjective:       Prior to Admission medications    Medication Sig Start Date End Date Taking? Authorizing Provider   vitamin D (CHOLECALCIFEROL) 25 MCG (1000 UT) TABS tablet Take by mouth daily    Ar Automatic Reconciliation   metFORMIN (GLUCOPHAGE) 500 MG tablet Take 500 mg by mouth 2 times daily (with meals) 11/15/21   Ar Automatic Reconciliation   progesterone (PROMETRIUM) 100 MG CAPS capsule Take 100 mg by mouth daily 11/15/21   Ar Automatic Reconciliation     Allergies   Allergen Reactions    Penicillins Anaphylaxis and Rash     Swilling in the hands and throat       Sulfa Antibiotics Nausea Only     Review of Systems  Negative, denies any possibility of pregnancy. History of polycystic ovary disease  No data recorded  Alert orientated without any distress or normal speech previously unvaccinated for COVID  Gardenia Parham was evaluated through a patient-initiated, synchronous (real-time) audio only encounter. She (or guardian if applicable) is aware that it is a billable service, which includes applicable co-pays, with coverage as determined by her insurance carrier. This visit was conducted with the patient's (and/or Huong Cordova guardian's) verbal consent.  She has not had a related appointment within my department in the past 7 days or scheduled within the next 24 hours. The patient was located in a state where the provider was licensed to provide care. The patient was located at: The provider was located at:      Total Time: minutes: 5-10 minutes    Kinsey Richardson MD

## 2022-07-05 NOTE — PATIENT INSTRUCTIONS
.      Increase fruits vegetables, fiber, whole grains, beans, exercise, tree nuts, will decrease risk of heart attacks and strokes, may reduce cancer risks     once a day multivitamin such as Centrum  store brand, due to benefit of folic acid vitamin D, has already mineral vitamin is recommended doses.   Multiple different vitamins not recommended may carry increased risk, including vitamin E, take foods rich in omega 3 and fibre, pills are not recommended, including omega 3 in high doses, may have increased risks

## 2022-07-27 ENCOUNTER — RX ONLY (OUTPATIENT)
Age: 32
Setting detail: RX ONLY
End: 2022-07-27

## 2022-07-27 ENCOUNTER — APPOINTMENT (RX ONLY)
Dept: URBAN - METROPOLITAN AREA CLINIC 24 | Facility: CLINIC | Age: 32
Setting detail: DERMATOLOGY
End: 2022-07-27

## 2022-07-27 DIAGNOSIS — L20.89 OTHER ATOPIC DERMATITIS: ICD-10-CM | Status: STABLE

## 2022-07-27 DIAGNOSIS — D22 MELANOCYTIC NEVI: ICD-10-CM

## 2022-07-27 DIAGNOSIS — L70.0 ACNE VULGARIS: ICD-10-CM | Status: STABLE

## 2022-07-27 PROBLEM — L85.3 XEROSIS CUTIS: Status: ACTIVE | Noted: 2022-07-27

## 2022-07-27 PROBLEM — L20.84 INTRINSIC (ALLERGIC) ECZEMA: Status: ACTIVE | Noted: 2022-07-27

## 2022-07-27 PROBLEM — D22.61 MELANOCYTIC NEVI OF RIGHT UPPER LIMB, INCLUDING SHOULDER: Status: ACTIVE | Noted: 2022-07-27

## 2022-07-27 PROBLEM — D22.5 MELANOCYTIC NEVI OF TRUNK: Status: ACTIVE | Noted: 2022-07-27

## 2022-07-27 PROBLEM — D22.62 MELANOCYTIC NEVI OF LEFT UPPER LIMB, INCLUDING SHOULDER: Status: ACTIVE | Noted: 2022-07-27

## 2022-07-27 PROCEDURE — ? PRESCRIPTION

## 2022-07-27 PROCEDURE — ? TREATMENT REGIMEN

## 2022-07-27 PROCEDURE — 99214 OFFICE O/P EST MOD 30 MIN: CPT

## 2022-07-27 PROCEDURE — ? COUNSELING

## 2022-07-27 PROCEDURE — ? OTHER

## 2022-07-27 PROCEDURE — ? PRESCRIPTION MEDICATION MANAGEMENT

## 2022-07-27 RX ORDER — DESOXIMETASONE 2.5 MG/G
OINTMENT TOPICAL
Qty: 100 | Refills: 6 | Status: ERX

## 2022-07-27 ASSESSMENT — LOCATION SIMPLE DESCRIPTION DERM
LOCATION SIMPLE: LEFT SHOULDER
LOCATION SIMPLE: RIGHT POSTERIOR UPPER ARM
LOCATION SIMPLE: RIGHT HAND
LOCATION SIMPLE: LEFT UPPER BACK
LOCATION SIMPLE: ABDOMEN
LOCATION SIMPLE: POSTERIOR SCALP
LOCATION SIMPLE: LEFT RING FINGER
LOCATION SIMPLE: LEFT HAND
LOCATION SIMPLE: RIGHT RING FINGER
LOCATION SIMPLE: LEFT CHEEK
LOCATION SIMPLE: RIGHT UPPER BACK

## 2022-07-27 ASSESSMENT — LOCATION DETAILED DESCRIPTION DERM
LOCATION DETAILED: LEFT INFERIOR UPPER BACK
LOCATION DETAILED: RIGHT RIB CAGE
LOCATION DETAILED: RIGHT PROXIMAL DORSAL RING FINGER
LOCATION DETAILED: LEFT INFERIOR CENTRAL MALAR CHEEK
LOCATION DETAILED: RIGHT SUPERIOR MEDIAL UPPER BACK
LOCATION DETAILED: RIGHT PROXIMAL POSTERIOR UPPER ARM
LOCATION DETAILED: LEFT THENAR EMINENCE
LOCATION DETAILED: RIGHT THENAR EMINENCE
LOCATION DETAILED: LEFT INFERIOR OCCIPITAL SCALP
LOCATION DETAILED: LEFT PROXIMAL DORSAL RING FINGER
LOCATION DETAILED: LEFT ANTERIOR SHOULDER

## 2022-07-27 ASSESSMENT — LOCATION ZONE DERM
LOCATION ZONE: FINGER
LOCATION ZONE: TRUNK
LOCATION ZONE: HAND
LOCATION ZONE: SCALP
LOCATION ZONE: ARM
LOCATION ZONE: FACE

## 2022-07-27 NOTE — PROCEDURE: COUNSELING
Sarecycline Pregnancy And Lactation Text: This medication is Pregnancy Category D and not consider safe during pregnancy. It is also excreted in breast milk.
Dapsone Counseling: I discussed with the patient the risks of dapsone including but not limited to hemolytic anemia, agranulocytosis, rashes, methemoglobinemia, kidney failure, peripheral neuropathy, headaches, GI upset, and liver toxicity.  Patients who start dapsone require monitoring including baseline LFTs and weekly CBCs for the first month, then every month thereafter.  The patient verbalized understanding of the proper use and possible adverse effects of dapsone.  All of the patient's questions and concerns were addressed.
Isotretinoin Counseling: Patient should get monthly blood tests, not donate blood, not drive at night if vision affected, not share medication, and not undergo elective surgery for 6 months after tx completed. Side effects reviewed, pt to contact office should one occur.
Spironolactone Counseling: Patient advised regarding risks of diarrhea, abdominal pain, hyperkalemia, birth defects (for female patients), liver toxicity and renal toxicity. The patient may need blood work to monitor liver and kidney function and potassium levels while on therapy. The patient verbalized understanding of the proper use and possible adverse effects of spironolactone.  All of the patient's questions and concerns were addressed.
Isotretinoin Pregnancy And Lactation Text: This medication is Pregnancy Category X and is considered extremely dangerous during pregnancy. It is unknown if it is excreted in breast milk.
Birth Control Pills Pregnancy And Lactation Text: This medication should be avoided if pregnant and for the first 30 days post-partum.
Birth Control Pills Counseling: Birth Control Pill Counseling: I discussed with the patient the potential side effects of OCPs including but not limited to increased risk of stroke, heart attack, thrombophlebitis, deep venous thrombosis, hepatic adenomas, breast changes, GI upset, headaches, and depression.  The patient verbalized understanding of the proper use and possible adverse effects of OCPs. All of the patient's questions and concerns were addressed.
Winlevi Counseling:  I discussed with the patient the risks of topical clascoterone including but not limited to erythema, scaling, itching, and stinging. Patient voiced their understanding.
Sarecycline Counseling: Patient advised regarding possible photosensitivity and discoloration of the teeth, skin, lips, tongue and gums.  Patient instructed to avoid sunlight, if possible.  When exposed to sunlight, patients should wear protective clothing, sunglasses, and sunscreen.  The patient was instructed to call the office immediately if the following severe adverse effects occur:  hearing changes, easy bruising/bleeding, severe headache, or vision changes.  The patient verbalized understanding of the proper use and possible adverse effects of sarecycline.  All of the patient's questions and concerns were addressed.
Doxycycline Pregnancy And Lactation Text: This medication is Pregnancy Category D and not consider safe during pregnancy. It is also excreted in breast milk but is considered safe for shorter treatment courses.
Detail Level: Zone
Tazorac Pregnancy And Lactation Text: This medication is not safe during pregnancy. It is unknown if this medication is excreted in breast milk.
Include Pregnancy/Lactation Warning?: No
Detail Level: Generalized
Dapsone Pregnancy And Lactation Text: This medication is Pregnancy Category C and is not considered safe during pregnancy or breast feeding.
High Dose Vitamin A Counseling: Side effects reviewed, pt to contact office should one occur.
Topical Clindamycin Pregnancy And Lactation Text: This medication is Pregnancy Category B and is considered safe during pregnancy. It is unknown if it is excreted in breast milk.
Erythromycin Counseling:  I discussed with the patient the risks of erythromycin including but not limited to GI upset, allergic reaction, drug rash, diarrhea, increase in liver enzymes, and yeast infections.
Topical Retinoid counseling:  Patient advised to apply a pea-sized amount only at bedtime and wait 30 minutes after washing their face before applying.  If too drying, patient may add a non-comedogenic moisturizer. The patient verbalized understanding of the proper use and possible adverse effects of retinoids.  All of the patient's questions and concerns were addressed.
Tetracycline Counseling: Patient counseled regarding possible photosensitivity and increased risk for sunburn.  Patient instructed to avoid sunlight, if possible.  When exposed to sunlight, patients should wear protective clothing, sunglasses, and sunscreen.  The patient was instructed to call the office immediately if the following severe adverse effects occur:  hearing changes, easy bruising/bleeding, severe headache, or vision changes.  The patient verbalized understanding of the proper use and possible adverse effects of tetracycline.  All of the patient's questions and concerns were addressed. Patient understands to avoid pregnancy while on therapy due to potential birth defects.
Topical Retinoid Pregnancy And Lactation Text: This medication is Pregnancy Category C. It is unknown if this medication is excreted in breast milk.
Minocycline Counseling: Patient advised regarding possible photosensitivity and discoloration of the teeth, skin, lips, tongue and gums.  Patient instructed to avoid sunlight, if possible.  When exposed to sunlight, patients should wear protective clothing, sunglasses, and sunscreen.  The patient was instructed to call the office immediately if the following severe adverse effects occur:  hearing changes, easy bruising/bleeding, severe headache, or vision changes.  The patient verbalized understanding of the proper use and possible adverse effects of minocycline.  All of the patient's questions and concerns were addressed.
Bactrim Pregnancy And Lactation Text: This medication is Pregnancy Category D and is known to cause fetal risk.  It is also excreted in breast milk.
Azithromycin Counseling:  I discussed with the patient the risks of azithromycin including but not limited to GI upset, allergic reaction, drug rash, diarrhea, and yeast infections.
Benzoyl Peroxide Pregnancy And Lactation Text: This medication is Pregnancy Category C. It is unknown if benzoyl peroxide is excreted in breast milk.
Benzoyl Peroxide Counseling: Patient counseled that medicine may cause skin irritation and bleach clothing.  In the event of skin irritation, the patient was advised to reduce the amount of the drug applied or use it less frequently.   The patient verbalized understanding of the proper use and possible adverse effects of benzoyl peroxide.  All of the patient's questions and concerns were addressed.
Erythromycin Pregnancy And Lactation Text: This medication is Pregnancy Category B and is considered safe during pregnancy. It is also excreted in breast milk.
Topical Sulfur Applications Counseling: Topical Sulfur Counseling: Patient counseled that this medication may cause skin irritation or allergic reactions.  In the event of skin irritation, the patient was advised to reduce the amount of the drug applied or use it less frequently.   The patient verbalized understanding of the proper use and possible adverse effects of topical sulfur application.  All of the patient's questions and concerns were addressed.
Azithromycin Pregnancy And Lactation Text: This medication is considered safe during pregnancy and is also secreted in breast milk.
Doxycycline Counseling:  Patient counseled regarding possible photosensitivity and increased risk for sunburn.  Patient instructed to avoid sunlight, if possible.  When exposed to sunlight, patients should wear protective clothing, sunglasses, and sunscreen.  The patient was instructed to call the office immediately if the following severe adverse effects occur:  hearing changes, easy bruising/bleeding, severe headache, or vision changes.  The patient verbalized understanding of the proper use and possible adverse effects of doxycycline.  All of the patient's questions and concerns were addressed.
Azelaic Acid Counseling: Patient counseled that medicine may cause skin irritation and to avoid applying near the eyes.  In the event of skin irritation, the patient was advised to reduce the amount of the drug applied or use it less frequently.   The patient verbalized understanding of the proper use and possible adverse effects of azelaic acid.  All of the patient's questions and concerns were addressed.
Winlevi Pregnancy And Lactation Text: This medication is considered safe during pregnancy and breastfeeding.
Aklief Pregnancy And Lactation Text: It is unknown if this medication is safe to use during pregnancy.  It is unknown if this medication is excreted in breast milk.  Breastfeeding women should use the topical cream on the smallest area of the skin for the shortest time needed while breastfeeding.  Do not apply to nipple and areola.
Spironolactone Pregnancy And Lactation Text: This medication can cause feminization of the male fetus and should be avoided during pregnancy. The active metabolite is also found in breast milk.
Tazorac Counseling:  Patient advised that medication is irritating and drying.  Patient may need to apply sparingly and wash off after an hour before eventually leaving it on overnight.  The patient verbalized understanding of the proper use and possible adverse effects of tazorac.  All of the patient's questions and concerns were addressed.
Aklief counseling:  Patient advised to apply a pea-sized amount only at bedtime and wait 30 minutes after washing their face before applying.  If too drying, patient may add a non-comedogenic moisturizer.  The most commonly reported side effects including irritation, redness, scaling, dryness, stinging, burning, itching, and increased risk of sunburn.  The patient verbalized understanding of the proper use and possible adverse effects of retinoids.  All of the patient's questions and concerns were addressed.
Topical Clindamycin Counseling: Patient counseled that this medication may cause skin irritation or allergic reactions.  In the event of skin irritation, the patient was advised to reduce the amount of the drug applied or use it less frequently.   The patient verbalized understanding of the proper use and possible adverse effects of clindamycin.  All of the patient's questions and concerns were addressed.
Azelaic Acid Pregnancy And Lactation Text: This medication is considered safe during pregnancy and breast feeding.
Bactrim Counseling:  I discussed with the patient the risks of sulfa antibiotics including but not limited to GI upset, allergic reaction, drug rash, diarrhea, dizziness, photosensitivity, and yeast infections.  Rarely, more serious reactions can occur including but not limited to aplastic anemia, agranulocytosis, methemoglobinemia, blood dyscrasias, liver or kidney failure, lung infiltrates or desquamative/blistering drug rashes.
High Dose Vitamin A Pregnancy And Lactation Text: High dose vitamin A therapy is contraindicated during pregnancy and breast feeding.
Topical Sulfur Applications Pregnancy And Lactation Text: This medication is Pregnancy Category C and has an unknown safety profile during pregnancy. It is unknown if this topical medication is excreted in breast milk.

## 2022-07-27 NOTE — PROCEDURE: PRESCRIPTION MEDICATION MANAGEMENT
Detail Level: Zone
Render In Strict Bullet Format?: No
Continue Regimen: Desoximetasone as prescribed

## 2022-07-27 NOTE — PROCEDURE: OTHER
Render Risk Assessment In Note?: no
Other (Free Text): Currently cleared and not doing anything at this time.
Detail Level: Simple
Note Text (......Xxx Chief Complaint.): This diagnosis correlates with the

## 2022-08-01 ENCOUNTER — RX ONLY (OUTPATIENT)
Age: 32
Setting detail: RX ONLY
End: 2022-08-01

## 2022-08-01 RX ORDER — DESOXIMETASONE 2.5 MG/G
OINTMENT TOPICAL
Qty: 100 | Refills: 6 | Status: ERX

## 2022-08-10 ENCOUNTER — RX ONLY (OUTPATIENT)
Age: 32
Setting detail: RX ONLY
End: 2022-08-10

## 2022-08-10 RX ORDER — BETAMETHASONE VALERATE 1 MG/G
CREAM TOPICAL
Qty: 45 | Refills: 5 | Status: ERX | COMMUNITY
Start: 2022-08-10

## 2022-09-08 ENCOUNTER — INITIAL PRENATAL (OUTPATIENT)
Dept: OBGYN CLINIC | Age: 32
End: 2022-09-08
Payer: COMMERCIAL

## 2022-09-08 VITALS — BODY MASS INDEX: 28.5 KG/M2 | HEIGHT: 67 IN | WEIGHT: 181.6 LBS

## 2022-09-08 DIAGNOSIS — Z86.59 HISTORY OF POSTPARTUM DEPRESSION: ICD-10-CM

## 2022-09-08 DIAGNOSIS — Z3A.09 9 WEEKS GESTATION OF PREGNANCY: ICD-10-CM

## 2022-09-08 DIAGNOSIS — Z34.81 PRENATAL CARE, SUBSEQUENT PREGNANCY, FIRST TRIMESTER: Primary | ICD-10-CM

## 2022-09-08 DIAGNOSIS — Z87.59 HISTORY OF PRE-ECLAMPSIA: ICD-10-CM

## 2022-09-08 DIAGNOSIS — Z32.01 PREGNANCY TEST POSITIVE: ICD-10-CM

## 2022-09-08 DIAGNOSIS — E28.2 PCOS (POLYCYSTIC OVARIAN SYNDROME): ICD-10-CM

## 2022-09-08 DIAGNOSIS — Q87.2 RUBINSTEIN-TAYBI SYNDROME: ICD-10-CM

## 2022-09-08 DIAGNOSIS — O36.80X0 ENCOUNTER TO DETERMINE FETAL VIABILITY OF PREGNANCY, SINGLE OR UNSPECIFIED FETUS: ICD-10-CM

## 2022-09-08 DIAGNOSIS — Z87.59 HISTORY OF POSTPARTUM DEPRESSION: ICD-10-CM

## 2022-09-08 PROBLEM — Z97.5 IUD (INTRAUTERINE DEVICE) IN PLACE: Status: RESOLVED | Noted: 2018-05-16 | Resolved: 2022-09-08

## 2022-09-08 PROBLEM — Z34.90 PREGNANCY: Status: ACTIVE | Noted: 2022-09-08

## 2022-09-08 LAB
ABO, EXTERNAL RESULT: NORMAL
BASOPHILS # BLD: 0 K/UL (ref 0–0.2)
BASOPHILS NFR BLD: 0 % (ref 0–2)
DIFFERENTIAL METHOD BLD: ABNORMAL
EOSINOPHIL # BLD: 0 K/UL (ref 0–0.8)
EOSINOPHIL NFR BLD: 0 % (ref 0.5–7.8)
ERYTHROCYTE [DISTWIDTH] IN BLOOD BY AUTOMATED COUNT: 13.7 % (ref 11.9–14.6)
HBV SURFACE AG SER QL: NONREACTIVE
HCG, PREGNANCY, URINE, POC: POSITIVE
HCT VFR BLD AUTO: 36.1 % (ref 35.8–46.3)
HCV AB SER QL: NONREACTIVE
HEP B, EXTERNAL RESULT: NORMAL
HEPATITIS C ANTIBODY, EXTERNAL RESULT: NORMAL
HGB BLD-MCNC: 12.3 G/DL (ref 11.7–15.4)
HIV, EXTERNAL RESULT: NORMAL
IMM GRANULOCYTES # BLD AUTO: 0 K/UL (ref 0–0.5)
IMM GRANULOCYTES NFR BLD AUTO: 0 % (ref 0–5)
LYMPHOCYTES # BLD: 3.4 K/UL (ref 0.5–4.6)
LYMPHOCYTES NFR BLD: 28 % (ref 13–44)
MCH RBC QN AUTO: 29.8 PG (ref 26.1–32.9)
MCHC RBC AUTO-ENTMCNC: 34.1 G/DL (ref 31.4–35)
MCV RBC AUTO: 87.4 FL (ref 79.6–97.8)
MONOCYTES # BLD: 0.5 K/UL (ref 0.1–1.3)
MONOCYTES NFR BLD: 4 % (ref 4–12)
NEUTS SEG # BLD: 8.1 K/UL (ref 1.7–8.2)
NEUTS SEG NFR BLD: 68 % (ref 43–78)
NRBC # BLD: 0 K/UL (ref 0–0.2)
PLATELET # BLD AUTO: 232 K/UL (ref 150–450)
PMV BLD AUTO: 10.9 FL (ref 9.4–12.3)
RBC # BLD AUTO: 4.13 M/UL (ref 4.05–5.2)
RH FACTOR, EXTERNAL RESULT: POSITIVE
RPR, EXTERNAL RESULT: NORMAL
RUBELLA TITER, EXTERNAL RESULT: NORMAL
RUBV IGG SERPL IA-ACNC: 69.4 IU/ML (ref 0–50)
VALID INTERNAL CONTROL, POC: YES
WBC # BLD AUTO: 12.1 K/UL (ref 4.3–11.1)

## 2022-09-08 PROCEDURE — 76817 TRANSVAGINAL US OBSTETRIC: CPT | Performed by: NURSE PRACTITIONER

## 2022-09-08 PROCEDURE — 81025 URINE PREGNANCY TEST: CPT | Performed by: NURSE PRACTITIONER

## 2022-09-08 NOTE — PROGRESS NOTES
Homero Serrano G4, P2 presents to the office today for NOB talk and ultrasound. EDC is 4/7/23 based off of LMP. Patient education was discussed including: nutrition, appropriate weight gain, diet, exercise, travel, hospital classes, breastfeeding/lactation services, flu vaccine, Tdap, glucola, GBS, and Corona Virus and Zika precautions. Genetic testing discussed in depth and patient declined. Patients past medical history is significant for heart murmur, PCOS, patient brother has rubinstein-Tybi syndrome, PEC with G1, patient has history or PP depression with both deliveries, no meds. She is advised to start  mg and Vit D 2000 iu at 12 weeks. She is to return to the office in 3 weeks for NOB exam. All questions answered and she voiced full understanding. She is encouraged to call the office with any questions or concerns.

## 2022-09-09 LAB
ABO + RH BLD: NORMAL
BLOOD GROUP ANTIBODIES SERPL: NORMAL
HIV 1+2 AB+HIV1 P24 AG SERPL QL IA: NONREACTIVE
HIV 1/2 RESULT COMMENT: NORMAL
RPR SER QL: NONREACTIVE

## 2022-09-11 LAB
BACTERIA SPEC CULT: NORMAL
SERVICE CMNT-IMP: NORMAL

## 2022-10-06 NOTE — PROGRESS NOTES
Ms. Misbah Terry  presents today for her initial OB exam.      Pt c/o lightheadedness and SOB q day. Pt c/o fatigue. Reassured pretty typical for new preg. She loast 40lbs and cycles more regularly. Patient's last menstrual period was 2022. Last pap: 21, nilm, neg HPV   Declined genetic testing. Estimated Date of Delivery: 23  OB History:   OB History    Para Term  AB Living   4 2 2 0 1 2   SAB IAB Ectopic Molar Multiple Live Births   1 0 0 0 0 2      # Outcome Date GA Lbr Carrington/2nd Weight Sex Delivery Anes PTL Lv   4 Current            3 Term 03/15/18 39w6d 05:11 :52 7 lb 5.5 oz (3.33 kg) M Vag-Spont  N JUANI      Name: Mateusz Lezama: 7  Apgar5: 8   2 Term 16 38w4d  5 lb 15 oz (2.693 kg) F Vag-Spont  N       Complications: Preeclampsia   1 SAB            3 and 6yo. Past Gyn History:   GYN History       Pcos hx. No abnl pap         Allergies: Allergies   Allergen Reactions    Penicillins Anaphylaxis and Rash     Swilling in the hands and throat   adult    Sulfa Antibiotics Nausea Only     Adult reaction       Medication History:  Current Outpatient Medications   Medication Sig Dispense Refill    Cholecalciferol (VITAMIN D) 50 MCG ( UT) CAPS capsule Take by mouth      Prenatal-FeFum-FA-DHA w/o A (PRENATAL + DHA PO) Take by mouth       No current facility-administered medications for this visit.        Past Medical History:  Past Medical History:   Diagnosis Date    Headache     Heart abnormality     heart murmur    Heart murmur     patient states is benign    Hypertension     Other ill-defined conditions(799.89)     Mono    PCOS (polycystic ovarian syndrome) 2014    Polycystic disease, ovaries     Postpartum depression     Preeclampsia     UTI (urinary tract infection)        Past Surgical History:  Past Surgical History:   Procedure Laterality Date    WISDOM TOOTH EXTRACTION         Social History:  Social History Socioeconomic History    Marital status:  -      Spouse name: Not on file    Number of children: Not on file    Years of education: Not on file    Highest education level: Not on file   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    Tobacco Use    Smoking status: Never    Smokeless tobacco: Never   Vaping Use    Vaping Use: Never used   Substance and Sexual Activity    Alcohol use: No    Drug use: No    Sexual activity: Yes     Partners: Male     Birth control/protection: None   Other Topics Concern    Not on file   Social History Narrative    Not on file     Social Determinants of Health     Financial Resource Strain: Not on file   Food Insecurity: Not on file   Transportation Needs: Not on file   Physical Activity: Not on file   Stress: Not on file   Social Connections: Not on file   Intimate Partner Violence: Not on file   Housing Stability: Not on file       Family History:  Family History   Problem Relation Age of Onset    Thyroid Cancer Paternal Grandfather     Lung Disease Maternal Grandmother     Heart Disease Maternal Grandfather     Gall Bladder Disease Father     Thyroid Disease Father     Melanoma Father     Migraines Mother     Osteoarthritis Mother     Mental Retardation Brother     Breast Cancer Neg Hx            Review of Systems      A comprehensive review of systems was negative except for that written in the history of present illness. BP 97/65   Ht 5' 7\" (1.702 m)   Wt 184 lb (83.5 kg)   LMP 07/01/2022   BMI 28.82 kg/m²   Protein, Urine, POC   Date Value Ref Range Status   10/07/2022 Negative Negative Final     General: well nourished well developed female in nad   Heart rrr  Lungs cta b&s  Abdomen soft nontender. No enlargement of liver. Extremities: no calf pain  Pelvic exam: normal external genitalia, vulva, vagina,  uterus and adnexa. Breasts: breasts appear normal, no suspicious masses, no skin or nipple changes or axillary nodes.       Assessment and Plan: Jacqueline Carrizales was seen today for new patient. Diagnoses and all orders for this visit:    Prenatal care, subsequent pregnancy, second trimester  -     Chlamydia, Gonorrhea, Trichomoniasis; Future  -     Chlamydia, Gonorrhea, Trichomoniasis    13 weeks gestation of pregnancy  -     Chlamydia, Gonorrhea, Trichomoniasis; Future  -     Chlamydia, Gonorrhea, Trichomoniasis    Screening for genitourinary condition  -     AMB POC OB URINE DIP    Venereal disease screening  -     Chlamydia, Gonorrhea, Trichomoniasis; Future  -     Chlamydia, Gonorrhea, Trichomoniasis      Had has some vaginal dryness - advised lubricant. Counseling was performed regarding expected weight gain, exercise risks of PTL,  avoidance of cat feces and raw material.  Discussed genetic testing and pt declines. All questions were answered. Will need glucola next visit and baseline labs. RTC 4 weeks.

## 2022-10-07 ENCOUNTER — ROUTINE PRENATAL (OUTPATIENT)
Dept: OBGYN CLINIC | Age: 32
End: 2022-10-07
Payer: COMMERCIAL

## 2022-10-07 VITALS
DIASTOLIC BLOOD PRESSURE: 65 MMHG | HEIGHT: 67 IN | SYSTOLIC BLOOD PRESSURE: 97 MMHG | BODY MASS INDEX: 28.88 KG/M2 | WEIGHT: 184 LBS

## 2022-10-07 DIAGNOSIS — Z13.89 SCREENING FOR GENITOURINARY CONDITION: ICD-10-CM

## 2022-10-07 DIAGNOSIS — Z3A.13 13 WEEKS GESTATION OF PREGNANCY: ICD-10-CM

## 2022-10-07 DIAGNOSIS — Z34.82 PRENATAL CARE, SUBSEQUENT PREGNANCY, SECOND TRIMESTER: Primary | ICD-10-CM

## 2022-10-07 DIAGNOSIS — Z11.3 VENEREAL DISEASE SCREENING: ICD-10-CM

## 2022-10-07 LAB
GLUCOSE URINE, POC: NEGATIVE
PROTEIN,URINE, POC: NEGATIVE

## 2022-10-07 PROCEDURE — 99902 PR PRENATAL VISIT: CPT | Performed by: OBSTETRICS & GYNECOLOGY

## 2022-10-07 PROCEDURE — 81002 URINALYSIS NONAUTO W/O SCOPE: CPT | Performed by: OBSTETRICS & GYNECOLOGY

## 2022-10-07 RX ORDER — MULTIVIT-MIN/IRON/FOLIC ACID/K 18-600-40
CAPSULE ORAL
COMMUNITY

## 2022-10-10 LAB
C. TRACHOMATIS, EXTERNAL RESULT: NEGATIVE
N. GONORRHOEAE, EXTERNAL RESULT: NEGATIVE

## 2022-10-13 LAB
C TRACH RRNA SPEC QL NAA+PROBE: NEGATIVE
N GONORRHOEA RRNA SPEC QL NAA+PROBE: NEGATIVE
SPECIMEN SOURCE: NORMAL
T VAGINALIS RRNA SPEC QL NAA+PROBE: NEGATIVE

## 2022-10-31 ENCOUNTER — PATIENT MESSAGE (OUTPATIENT)
Dept: OBGYN CLINIC | Age: 32
End: 2022-10-31

## 2022-10-31 RX ORDER — OSELTAMIVIR PHOSPHATE 75 MG/1
75 CAPSULE ORAL 2 TIMES DAILY
Qty: 10 CAPSULE | Refills: 0 | Status: SHIPPED | OUTPATIENT
Start: 2022-10-31 | End: 2022-11-05

## 2022-10-31 NOTE — TELEPHONE ENCOUNTER
A catheter was exchanged for a (Catheter 6fr Jr4 Crv 100cm Lg Inner [facility]) catheter.  From: Noelle Serrano  To: Dr. Jolynn Yeung  Sent: 10/31/2022 8:45 AM EDT  Subject: Flu and 17 weeks pregnant    Good morning,     My children had the flu last week and I started showing symptoms yesterday. I woke up with a 102.3 fever, have a headache, body aches, and congestion. Is there a way I can get a prescription for Tamiflu? I will keep trying to call the office. Right now, it just keeps ringing. Thank you for your help.     [facility]

## 2022-11-08 ENCOUNTER — NURSE ONLY (OUTPATIENT)
Dept: OBGYN CLINIC | Age: 32
End: 2022-11-08

## 2022-11-08 ENCOUNTER — ROUTINE PRENATAL (OUTPATIENT)
Dept: OBGYN CLINIC | Age: 32
End: 2022-11-08
Payer: COMMERCIAL

## 2022-11-08 VITALS — WEIGHT: 185 LBS | SYSTOLIC BLOOD PRESSURE: 110 MMHG | BODY MASS INDEX: 28.98 KG/M2 | DIASTOLIC BLOOD PRESSURE: 71 MMHG

## 2022-11-08 DIAGNOSIS — Z34.92 PRENATAL CARE, SECOND TRIMESTER: Primary | ICD-10-CM

## 2022-11-08 DIAGNOSIS — Z3A.18 18 WEEKS GESTATION OF PREGNANCY: ICD-10-CM

## 2022-11-08 DIAGNOSIS — Z3A.09 9 WEEKS GESTATION OF PREGNANCY: ICD-10-CM

## 2022-11-08 DIAGNOSIS — Z34.82 PRENATAL CARE, SUBSEQUENT PREGNANCY, SECOND TRIMESTER: Primary | ICD-10-CM

## 2022-11-08 DIAGNOSIS — Z23 ENCOUNTER FOR IMMUNIZATION: ICD-10-CM

## 2022-11-08 DIAGNOSIS — Z13.89 SCREENING FOR GENITOURINARY CONDITION: ICD-10-CM

## 2022-11-08 DIAGNOSIS — Z13.1 SCREENING FOR DIABETES MELLITUS: ICD-10-CM

## 2022-11-08 DIAGNOSIS — Z34.81 PRENATAL CARE, SUBSEQUENT PREGNANCY, FIRST TRIMESTER: ICD-10-CM

## 2022-11-08 DIAGNOSIS — O09.299 HX OF PRE-ECLAMPSIA IN PRIOR PREGNANCY, CURRENTLY PREGNANT: ICD-10-CM

## 2022-11-08 LAB
BASOPHILS # BLD: 0 K/UL (ref 0–0.2)
BASOPHILS NFR BLD: 0 % (ref 0–2)
DIFFERENTIAL METHOD BLD: ABNORMAL
EOSINOPHIL # BLD: 0.1 K/UL (ref 0–0.8)
EOSINOPHIL NFR BLD: 1 % (ref 0.5–7.8)
ERYTHROCYTE [DISTWIDTH] IN BLOOD BY AUTOMATED COUNT: 13.3 % (ref 11.9–14.6)
GLUCOSE URINE, POC: NEGATIVE
HCT VFR BLD AUTO: 35.9 % (ref 35.8–46.3)
HGB BLD-MCNC: 12 G/DL (ref 11.7–15.4)
IMM GRANULOCYTES # BLD AUTO: 0.1 K/UL (ref 0–0.5)
IMM GRANULOCYTES NFR BLD AUTO: 1 % (ref 0–5)
LYMPHOCYTES # BLD: 3.4 K/UL (ref 0.5–4.6)
LYMPHOCYTES NFR BLD: 30 % (ref 13–44)
MCH RBC QN AUTO: 30 PG (ref 26.1–32.9)
MCHC RBC AUTO-ENTMCNC: 33.4 G/DL (ref 31.4–35)
MCV RBC AUTO: 89.8 FL (ref 82–102)
MONOCYTES # BLD: 0.6 K/UL (ref 0.1–1.3)
MONOCYTES NFR BLD: 5 % (ref 4–12)
NEUTS SEG # BLD: 7.5 K/UL (ref 1.7–8.2)
NEUTS SEG NFR BLD: 63 % (ref 43–78)
NRBC # BLD: 0 K/UL (ref 0–0.2)
PLATELET # BLD AUTO: 310 K/UL (ref 150–450)
PMV BLD AUTO: 10.9 FL (ref 9.4–12.3)
PROTEIN,URINE, POC: NEGATIVE
RBC # BLD AUTO: 4 M/UL (ref 4.05–5.2)
WBC # BLD AUTO: 11.6 K/UL (ref 4.3–11.1)

## 2022-11-08 PROCEDURE — 90471 IMMUNIZATION ADMIN: CPT | Performed by: OBSTETRICS & GYNECOLOGY

## 2022-11-08 PROCEDURE — 90674 CCIIV4 VAC NO PRSV 0.5 ML IM: CPT | Performed by: OBSTETRICS & GYNECOLOGY

## 2022-11-08 PROCEDURE — 99902 PR PRENATAL VISIT: CPT | Performed by: OBSTETRICS & GYNECOLOGY

## 2022-11-08 NOTE — PROGRESS NOTES
glucola today and baseline pree labs. rtc 2 weeks for anatomy us. Ptl precations. Start asa and vit d.

## 2022-11-08 NOTE — PROGRESS NOTES
18wk GTT performed today. Reports some flickers of movement. Pt has hx of pre-E w/ first pregnancy and unsure if she should take Aspirin.

## 2022-11-09 LAB
ALBUMIN SERPL-MCNC: 3.2 G/DL (ref 3.5–5)
ALBUMIN/GLOB SERPL: 0.9 {RATIO} (ref 0.4–1.6)
ALP SERPL-CCNC: 84 U/L (ref 50–136)
ALT SERPL-CCNC: 27 U/L (ref 12–65)
ANION GAP SERPL CALC-SCNC: 6 MMOL/L (ref 2–11)
AST SERPL-CCNC: 15 U/L (ref 15–37)
BILIRUB SERPL-MCNC: 0.4 MG/DL (ref 0.2–1.1)
BUN SERPL-MCNC: 4 MG/DL (ref 6–23)
CALCIUM SERPL-MCNC: 9.1 MG/DL (ref 8.3–10.4)
CHLORIDE SERPL-SCNC: 106 MMOL/L (ref 101–110)
CO2 SERPL-SCNC: 26 MMOL/L (ref 21–32)
CREAT SERPL-MCNC: 0.6 MG/DL (ref 0.6–1)
CREAT UR-MCNC: 14 MG/DL
GLOBULIN SER CALC-MCNC: 3.4 G/DL (ref 2.8–4.5)
GLUCOSE 1 HOUR: 122 MG/DL
GLUCOSE SERPL-MCNC: 115 MG/DL (ref 65–100)
LDH SERPL L TO P-CCNC: 179 U/L (ref 100–190)
POTASSIUM SERPL-SCNC: 3.7 MMOL/L (ref 3.5–5.1)
PROT SERPL-MCNC: 6.6 G/DL (ref 6.3–8.2)
PROT UR-MCNC: <5 MG/DL
PROT/CREAT UR-RTO: NORMAL
SODIUM SERPL-SCNC: 138 MMOL/L (ref 133–143)
URATE SERPL-MCNC: 3.7 MG/DL (ref 2.6–6)

## 2022-11-10 LAB — VZV IGG SER IA-ACNC: <135 INDEX

## 2022-11-11 PROBLEM — O09.899 MATERNAL VARICELLA, NON-IMMUNE: Status: ACTIVE | Noted: 2022-11-11

## 2022-11-11 PROBLEM — Z28.39 MATERNAL VARICELLA, NON-IMMUNE: Status: ACTIVE | Noted: 2022-11-11

## 2022-11-21 ENCOUNTER — ROUTINE PRENATAL (OUTPATIENT)
Dept: OBGYN CLINIC | Age: 32
End: 2022-11-21
Payer: COMMERCIAL

## 2022-11-21 VITALS — BODY MASS INDEX: 29.95 KG/M2 | DIASTOLIC BLOOD PRESSURE: 58 MMHG | SYSTOLIC BLOOD PRESSURE: 102 MMHG | WEIGHT: 191.2 LBS

## 2022-11-21 DIAGNOSIS — Z36.3 ENCOUNTER FOR ROUTINE SCREENING FOR FETAL MALFORMATION USING ULTRASOUND: Primary | ICD-10-CM

## 2022-11-21 DIAGNOSIS — Z13.89 SCREENING FOR GENITOURINARY CONDITION: ICD-10-CM

## 2022-11-21 DIAGNOSIS — Z3A.20 20 WEEKS GESTATION OF PREGNANCY: ICD-10-CM

## 2022-11-21 DIAGNOSIS — E28.2 PCOS (POLYCYSTIC OVARIAN SYNDROME): ICD-10-CM

## 2022-11-21 DIAGNOSIS — Z34.82 PRENATAL CARE, SUBSEQUENT PREGNANCY, SECOND TRIMESTER: ICD-10-CM

## 2022-11-21 LAB
GLUCOSE URINE, POC: NEGATIVE
PROTEIN,URINE, POC: NEGATIVE

## 2022-11-21 PROCEDURE — 76805 OB US >/= 14 WKS SNGL FETUS: CPT | Performed by: OBSTETRICS & GYNECOLOGY

## 2022-11-21 PROCEDURE — 99902 PR PRENATAL VISIT: CPT | Performed by: OBSTETRICS & GYNECOLOGY

## 2022-12-19 ENCOUNTER — ROUTINE PRENATAL (OUTPATIENT)
Dept: OBGYN CLINIC | Age: 32
End: 2022-12-19
Payer: COMMERCIAL

## 2022-12-19 VITALS — DIASTOLIC BLOOD PRESSURE: 60 MMHG | BODY MASS INDEX: 31.17 KG/M2 | SYSTOLIC BLOOD PRESSURE: 112 MMHG | WEIGHT: 199 LBS

## 2022-12-19 DIAGNOSIS — Z3A.24 24 WEEKS GESTATION OF PREGNANCY: ICD-10-CM

## 2022-12-19 DIAGNOSIS — Z36.2 ENCOUNTER FOR FOLLOW-UP ULTRASOUND OF FETAL ANATOMY: Primary | ICD-10-CM

## 2022-12-19 DIAGNOSIS — Z13.89 SCREENING FOR GENITOURINARY CONDITION: ICD-10-CM

## 2022-12-19 DIAGNOSIS — Z34.82 PRENATAL CARE, SUBSEQUENT PREGNANCY, SECOND TRIMESTER: ICD-10-CM

## 2022-12-19 PROCEDURE — 99902 PR PRENATAL VISIT: CPT | Performed by: OBSTETRICS & GYNECOLOGY

## 2022-12-19 PROCEDURE — 76816 OB US FOLLOW-UP PER FETUS: CPT | Performed by: OBSTETRICS & GYNECOLOGY

## 2023-01-16 ENCOUNTER — ROUTINE PRENATAL (OUTPATIENT)
Dept: OBGYN CLINIC | Age: 33
End: 2023-01-16
Payer: COMMERCIAL

## 2023-01-16 VITALS — BODY MASS INDEX: 32.48 KG/M2 | DIASTOLIC BLOOD PRESSURE: 62 MMHG | SYSTOLIC BLOOD PRESSURE: 110 MMHG | WEIGHT: 207.4 LBS

## 2023-01-16 DIAGNOSIS — Z23 ENCOUNTER FOR IMMUNIZATION: ICD-10-CM

## 2023-01-16 DIAGNOSIS — Z13.1 SCREENING FOR DIABETES MELLITUS: ICD-10-CM

## 2023-01-16 DIAGNOSIS — Z87.59 HISTORY OF PRE-ECLAMPSIA: ICD-10-CM

## 2023-01-16 DIAGNOSIS — Z34.82 PRENATAL CARE, SUBSEQUENT PREGNANCY, SECOND TRIMESTER: Primary | ICD-10-CM

## 2023-01-16 DIAGNOSIS — Z86.59 HISTORY OF POSTPARTUM DEPRESSION: ICD-10-CM

## 2023-01-16 DIAGNOSIS — Z3A.28 28 WEEKS GESTATION OF PREGNANCY: ICD-10-CM

## 2023-01-16 DIAGNOSIS — Z87.59 HISTORY OF POSTPARTUM DEPRESSION: ICD-10-CM

## 2023-01-16 DIAGNOSIS — Z13.89 SCREENING FOR GENITOURINARY CONDITION: ICD-10-CM

## 2023-01-16 DIAGNOSIS — E28.2 PCOS (POLYCYSTIC OVARIAN SYNDROME): ICD-10-CM

## 2023-01-16 DIAGNOSIS — Z13.0 SCREENING FOR IRON DEFICIENCY ANEMIA: ICD-10-CM

## 2023-01-16 LAB
GLUCOSE URINE, POC: NEGATIVE
HGB BLD-MCNC: 11 G/DL (ref 11.7–15.4)
PROTEIN,URINE, POC: NEGATIVE

## 2023-01-16 PROCEDURE — 90715 TDAP VACCINE 7 YRS/> IM: CPT | Performed by: STUDENT IN AN ORGANIZED HEALTH CARE EDUCATION/TRAINING PROGRAM

## 2023-01-16 PROCEDURE — 90471 IMMUNIZATION ADMIN: CPT | Performed by: STUDENT IN AN ORGANIZED HEALTH CARE EDUCATION/TRAINING PROGRAM

## 2023-01-16 PROCEDURE — 99902 PR PRENATAL VISIT: CPT | Performed by: STUDENT IN AN ORGANIZED HEALTH CARE EDUCATION/TRAINING PROGRAM

## 2023-01-16 RX ORDER — ASPIRIN 81 MG/1
81 TABLET ORAL DAILY
COMMUNITY

## 2023-01-16 NOTE — PROGRESS NOTES
34 y.o. [id number] at 28w3d  who is here for routine OB visit. Pt is requesting a breast pump prescription, had one already but accidentally throw it away. Rx given. 1-hr GTT today  Tdap today     HISTORY:  OB History    Para Term  AB Living   4 2 2   1 2   SAB IAB Ectopic Molar Multiple Live Births   1         2      # Outcome Date GA Lbr Carrington/2nd Weight Sex Delivery Anes PTL Lv   4 Current            3 Term 03/15/18 39w6d 05:11 :52 7 lb 5.5 oz (3.33 kg) M Vag-Spont  N JUANI   2 Term 16 38w4d  5 lb 15 oz (2.693 kg) F Vag-Spont  N       Complications: Preeclampsia   1 SAB               Patient's last menstrual period was 2022. Social History     Substance and Sexual Activity   Sexual Activity Yes    Partners: Male    Birth control/protection: None      Past Medical History:   Diagnosis Date    Headache     Heart abnormality     heart murmur    Heart murmur     patient states is benign    Hypertension     Other ill-defined conditions(799.89)     Mono    PCOS (polycystic ovarian syndrome) 2014    Polycystic disease, ovaries     Postpartum depression     Preeclampsia     UTI (urinary tract infection)       Past Surgical History:   Procedure Laterality Date    WISDOM TOOTH EXTRACTION         ROS:  Feeling well. No dyspnea or chest pain on exertion. No abdominal pain, change in bowel habits, black or bloody stools. No urinary tract symptoms. OB ROS: No vaginal bleeding, cxns, LOF, decreased FM. No HA, vision changes, abdominal pain. PHYSICAL EXAM:  /62   Wt 207 lb 6.4 oz (94.1 kg)   LMP 2022   BMI 32.48 kg/m²        ASSESSMENT / PLAN:  1. Prenatal care, subsequent pregnancy, second trimester  -     AMB POC OB URINE DIP  -     Glucose tolerance, 1 hour only, single test; Future  -     Hemoglobin; Future  2.  Screening for genitourinary condition  -     AMB POC OB URINE DIP  3. Screening for diabetes mellitus  -     Glucose tolerance, 1 hour only, single test; Future  4. Screening for iron deficiency anemia  -     Hemoglobin; Future  5. 28 weeks gestation of pregnancy  Overview:  Declined genetic testing. COVID-not vaccinated  Flu- given  Tdap- given 1/16/23  Orders:  -     AMB POC OB URINE DIP  -     Glucose tolerance, 1 hour only, single test; Future  -     Hemoglobin; Future  6. PCOS (polycystic ovarian syndrome)  Overview:  18 wk GTT- 122    28 wk GTT- collected 1/16/23    7. Encounter for immunization  Overview:  Flu vaccine- given 11/8/22  8. History of postpartum depression  Overview:  Never taken meds, but states had after both deliveries. 9. History of pre-eclampsia  Overview: With G1.   Start  mg and Vit D 2000 iu at 12 weeks until 36w  Baseline labs-wnl   11/8/22 - Urine protein creatinine ratio is too low to calculate         Emilie Martinez MD

## 2023-01-17 LAB — GLUCOSE 1 HOUR: 155 MG/DL

## 2023-01-18 ENCOUNTER — TELEPHONE (OUTPATIENT)
Dept: OBGYN CLINIC | Age: 33
End: 2023-01-18

## 2023-01-18 NOTE — TELEPHONE ENCOUNTER
Contacted pt about failed 1hr GTT - pt v/u and scheduled for 3hr GTT on 1/26/23. No further questions.

## 2023-01-24 DIAGNOSIS — R73.09 ABNORMAL GTT (GLUCOSE TOLERANCE TEST): ICD-10-CM

## 2023-01-24 DIAGNOSIS — Z34.83 PRENATAL CARE, SUBSEQUENT PREGNANCY, THIRD TRIMESTER: Primary | ICD-10-CM

## 2023-01-24 DIAGNOSIS — Z3A.29 29 WEEKS GESTATION OF PREGNANCY: ICD-10-CM

## 2023-01-27 ENCOUNTER — PATIENT MESSAGE (OUTPATIENT)
Dept: OBGYN CLINIC | Age: 33
End: 2023-01-27

## 2023-01-27 DIAGNOSIS — O24.410 DIET CONTROLLED GESTATIONAL DIABETES MELLITUS (GDM) IN THIRD TRIMESTER: Primary | ICD-10-CM

## 2023-01-27 PROBLEM — O24.419 GESTATIONAL DIABETES: Status: ACTIVE | Noted: 2023-01-27

## 2023-01-27 RX ORDER — LANCETS 30 GAUGE
1 EACH MISCELLANEOUS DAILY
Qty: 150 EACH | Refills: 5 | Status: SHIPPED | OUTPATIENT
Start: 2023-01-27

## 2023-01-27 RX ORDER — GLUCOSAMINE HCL/CHONDROITIN SU 500-400 MG
CAPSULE ORAL
Qty: 150 STRIP | Refills: 5 | Status: SHIPPED | OUTPATIENT
Start: 2023-01-27

## 2023-01-27 NOTE — TELEPHONE ENCOUNTER
Discussed in detail w/ pt. All questions answered. Encouraged pt to send message or call w/ further questions. Supplies and referrals sent.

## 2023-01-30 ENCOUNTER — ROUTINE PRENATAL (OUTPATIENT)
Dept: OBGYN CLINIC | Age: 33
End: 2023-01-30

## 2023-01-30 VITALS — WEIGHT: 210.4 LBS | DIASTOLIC BLOOD PRESSURE: 71 MMHG | BODY MASS INDEX: 32.95 KG/M2 | SYSTOLIC BLOOD PRESSURE: 121 MMHG

## 2023-01-30 DIAGNOSIS — Z13.89 SCREENING FOR GENITOURINARY CONDITION: ICD-10-CM

## 2023-01-30 DIAGNOSIS — Z34.93 PRENATAL CARE, THIRD TRIMESTER: Primary | ICD-10-CM

## 2023-01-30 DIAGNOSIS — Z3A.30 30 WEEKS GESTATION OF PREGNANCY: ICD-10-CM

## 2023-01-30 DIAGNOSIS — Z87.59 HISTORY OF PRE-ECLAMPSIA: ICD-10-CM

## 2023-01-30 DIAGNOSIS — E28.2 PCOS (POLYCYSTIC OVARIAN SYNDROME): ICD-10-CM

## 2023-01-30 DIAGNOSIS — O24.410 DIET CONTROLLED GESTATIONAL DIABETES MELLITUS (GDM) IN THIRD TRIMESTER: ICD-10-CM

## 2023-01-30 DIAGNOSIS — Z86.59 HISTORY OF POSTPARTUM DEPRESSION: ICD-10-CM

## 2023-01-30 DIAGNOSIS — Z87.59 HISTORY OF POSTPARTUM DEPRESSION: ICD-10-CM

## 2023-01-30 LAB
GLUCOSE URINE, POC: NEGATIVE
PROTEIN,URINE, POC: NEGATIVE

## 2023-01-30 PROCEDURE — 99902 PR PRENATAL VISIT: CPT | Performed by: STUDENT IN AN ORGANIZED HEALTH CARE EDUCATION/TRAINING PROGRAM

## 2023-01-30 NOTE — PROGRESS NOTES
34 y.o. [id number] at 30w3d  who is here for routine OB visit. [facility] appt scheduled 23. HISTORY:  OB History    Para Term  AB Living   4 2 2   1 2   SAB IAB Ectopic Molar Multiple Live Births   1         2      # Outcome Date GA Lbr Carrington/2nd Weight Sex Delivery Anes PTL Lv   4 Current            3 Term 03/15/18 39w6d 05:11 :52 7 lb 5.5 oz (3.33 kg) M Vag-Spont  N JUANI   2 Term 16 38w4d  5 lb 15 oz (2.693 kg) F Vag-Spont  N       Complications: Preeclampsia   1 SAB               Patient's last menstrual period was 2022. Social History     Substance and Sexual Activity   Sexual Activity Yes    Partners: Male    Birth control/protection: None      Past Medical History:   Diagnosis Date    Gestational diabetes 2023    Headache     Heart abnormality     heart murmur    Heart murmur     patient states is benign    Hypertension     Other ill-defined conditions(799.89)     Mono    PCOS (polycystic ovarian syndrome) 2014    Polycystic disease, ovaries     Postpartum depression     Preeclampsia     UTI (urinary tract infection)       Past Surgical History:   Procedure Laterality Date    WISDOM TOOTH EXTRACTION         ROS:  Feeling well. No dyspnea or chest pain on exertion. No abdominal pain, change in bowel habits, black or bloody stools. No urinary tract symptoms. OB ROS: No vaginal bleeding, cxns, LOF, decreased FM. No HA, vision changes, abdominal pain. PHYSICAL EXAM:  /71   Wt 210 lb 6.4 oz (95.4 kg)   LMP 2022   BMI 32.95 kg/m²        ASSESSMENT / PLAN:  1. Prenatal care, third trimester  -     AMB POC OB URINE DIP  2. 30 weeks gestation of pregnancy  Overview:  Declined genetic testing. COVID-not vaccinated  Flu- given  Tdap- given 23  Orders:  -     AMB POC OB URINE DIP  3. Diet controlled gestational diabetes mellitus (GDM) in third trimester  Overview:  Supplies and referral sent 23  4.  PCOS (polycystic ovarian syndrome)  Overview:  18 wk GTT- 122    28 wk GTT- failed 1-hr and 3-hr  5. History of pre-eclampsia  Overview: With G1. Start  mg and Vit D 2000 iu at 12 weeks until 36w  Baseline labs-wnl   11/8/22 - Urine protein creatinine ratio is too low to calculate    6. History of postpartum depression  Overview:  Never taken meds, but states had after both deliveries.   7. Screening for genitourinary condition  -     AMB POC OB URINE DIP         Darlene Childers MD

## 2023-01-31 ENCOUNTER — FOLLOWUP TELEPHONE ENCOUNTER (OUTPATIENT)
Dept: DIABETES SERVICES | Age: 33
End: 2023-01-31

## 2023-02-06 ENCOUNTER — HOSPITAL ENCOUNTER (OUTPATIENT)
Dept: DIABETES SERVICES | Age: 33
Setting detail: RECURRING SERIES
Discharge: HOME | End: 2023-02-09
Payer: COMMERCIAL

## 2023-02-06 VITALS — BODY MASS INDEX: 32.42 KG/M2 | WEIGHT: 207 LBS

## 2023-02-06 PROCEDURE — G0109 DIAB MANAGE TRN IND/GROUP: HCPCS

## 2023-02-06 NOTE — PROGRESS NOTES
Gestational Diabetes Self-Management Support Plan    Services Provided: Pt received education on nutrition and meal planning during pregnancy. Emotional support for adjustment to diagnosis was provided. Care Plan/Recommendations:  Pt instructed to record blood sugar 4x/day and record all meals and snacks. Pt to bring information to appointments with [address] [place] [address] [address] [facility]. Notes for Follow Up:   1. Barriers to checking blood glucose and adherence to meal plan identified: none  2. Barriers to psychological adjustment to diagnosis identified: none  3. Patient needs to be seen by [facility] ASAP due to: 2/9/23.       Wilfrido Phelan, [address], [place], CDE  [facility] [address]

## 2023-02-08 PROBLEM — O09.93 HIGH-RISK PREGNANCY IN THIRD TRIMESTER: Status: ACTIVE | Noted: 2022-09-08

## 2023-02-09 ENCOUNTER — ROUTINE PRENATAL (OUTPATIENT)
Dept: OBGYN CLINIC | Age: 33
End: 2023-02-09

## 2023-02-09 VITALS — SYSTOLIC BLOOD PRESSURE: 94 MMHG | DIASTOLIC BLOOD PRESSURE: 62 MMHG

## 2023-02-09 DIAGNOSIS — O09.93 HIGH-RISK PREGNANCY IN THIRD TRIMESTER: Primary | ICD-10-CM

## 2023-02-09 DIAGNOSIS — Z13.32 ENCOUNTER FOR SCREENING FOR MATERNAL DEPRESSION: ICD-10-CM

## 2023-02-09 DIAGNOSIS — Z87.42 HISTORY OF POLYCYSTIC OVARIAN SYNDROME: ICD-10-CM

## 2023-02-09 DIAGNOSIS — Z84.89 FAMILY HISTORY OF GENETIC DISORDER: ICD-10-CM

## 2023-02-09 DIAGNOSIS — Z3A.31 31 WEEKS GESTATION OF PREGNANCY: ICD-10-CM

## 2023-02-09 DIAGNOSIS — Z87.59 HISTORY OF PRE-ECLAMPSIA: ICD-10-CM

## 2023-02-09 DIAGNOSIS — Z86.59 HISTORY OF POSTPARTUM DEPRESSION: ICD-10-CM

## 2023-02-09 DIAGNOSIS — O24.410 DIET CONTROLLED GESTATIONAL DIABETES MELLITUS (GDM) IN THIRD TRIMESTER: ICD-10-CM

## 2023-02-09 DIAGNOSIS — Z87.59 HISTORY OF POSTPARTUM DEPRESSION: ICD-10-CM

## 2023-02-09 PROBLEM — R01.1 HEART MURMUR: Status: ACTIVE | Noted: 2017-07-24

## 2023-02-09 ASSESSMENT — PATIENT HEALTH QUESTIONNAIRE - PHQ9
SUM OF ALL RESPONSES TO PHQ9 QUESTIONS 1 & 2: 2
1. LITTLE INTEREST OR PLEASURE IN DOING THINGS: 1
SUM OF ALL RESPONSES TO PHQ QUESTIONS 1-9: 2
2. FEELING DOWN, DEPRESSED OR HOPELESS: 1
SUM OF ALL RESPONSES TO PHQ QUESTIONS 1-9: 2

## 2023-02-09 NOTE — PATIENT INSTRUCTIONS
Please remember to send your glucose log weekly to [facility] either through [facility] or to [email or web address]. It helps us better manage your gestational diabetes and overall care for you and your baby!

## 2023-02-09 NOTE — ASSESSMENT & PLAN NOTE
Gestational Diabetes Mellitus  Gestational DM develops during pregnancy in women whose pancreatic function is insufficient to overcome the insulin resistance associated with the pregnant state. Among the main consequences are increased risks of preeclampsia, macrosomia, and  delivery, and their associated morbidities. The risks of these outcomes increase as maternal fasting plasma glucose levels increase above 75 mg/dL. Patient counseled that insulin resistance will rise as pregnancy progresses. She was counseled re appropriate diet choices and activity. She has a moderate understanding and high desire to optimize her pregnancy's health. In addition, the diagnosis of gestational diabetes in pregnancy raises risk for maternal type 2 diabetes in future (up to 50% within 15 years) and cardiovascular disease. The best way to minimize these risks are to maintain diet changes and increase activity levels. Patients should establish care with a PCP for surveillance every 1-2 years to monitor for development of diabetes. This risk is modified by breast feeding for at least 6-12 months. Infants of pregnancies associated with gestational diabetes are at risk for  hypoglycemia due to increased insulin production to manage the glucose which crosses the placenta. In addition, they are at risk for obesity and altered glucose metabolism throughout their life. This risk is decreased with exposure to breast milk. In those affected by diabetes in pregnancy, consider initiation of  milk expression beginning at 37 weeks. Antepartum Recommendations:   Continue to monitor glycemic control 4 times daily- fasting and 1 hour after starting each meal.   Send glucose logs to [facility] and primary OB each week. · Patient will return to [facility] for growth in ~3 weeks. · Will determine need for  testing in 3rd trimester depending upon maternal and fetal conditions. Plan serial growth scans. Intrapartum Recommendations:  · Check glucose every every 4 hours during cervical ripening, every 2hr in latent labor, and every 1hr in active labor. · Goal of glucose  while in labor, adjust IVF and insulin as needed. Postpartum and Longterm Recommendations:   · May discontinue medical therapy for gestational DM at delivery. · Continue testing BG for 24 hrs post-partum. If normal, discontinue at that time. · Repeat 2 hr Glucose Tolerance Test at 4-12 weeks post-partum and every 1-2 years. · If unable to tolerate 2-hr GTT, may check fasting glucose 2-3 times in one week along with a hemoglobin a1c.   · ADA diabetes diagnostic criteria are any of the following: Hgb A1c > 6.5%, or Fasting > 126, or 2 pp > 200, or Random > 200 with symptoms  · Alternatively, can consider 2hr GTT while in hospital postpartum. · Recommend patient establish care with a primary care provider. Pt should be evaluated annually for development of diabetes. Reviewed log, all readings WNL with using elevated parameters. Continue diet control with ELEVATED PARAMETERS; BID testing - fasting plus 1 rotating meal daily. 30 minutes spend on diabetic education by KATHERINE.

## 2023-02-09 NOTE — PROGRESS NOTES
MFM Consultation    Luna Menard (: 1990) is a 34 y.o. [id number] at [address] with 2023, by Last Menstrual Period. Presents for evaluation of the following chief complaint(s):  High Risk Pregnancy (GDM, H/O PreE, H/O Postpartum)     Patient is working full time as a  in the virtual program at [employer]. Scheduled to see primary OB ([facility]) on 2023. Spouse present today. GDM:  Patient with recent diagnosis of gestational diabetes. Full details of current status in A/P section of problem list. Patient underwent [employer] on 2023. No HAs, edema. Denies preeclamptic symptoms. Reports good fetal movement. No bleeding, LOF, cramping, ctxs, or vaginal pressure. Reports leg cramps at night; suggested stretching and Magnesium  and Calcium. History reviewed and updated as needed. Review of Systems - per HPI; otherwise unremarkable. Exam:     Vitals:    23 1505   BP: 94/62     Recent Mood:  Mood concerns: well    Recent Labs Reviewed. Please see formal ultrasound report under imaging tab. ASSESSMENT/PLAN:  Patient Active Problem List    Diagnosis Date Noted    Gestational diabetes mellitus (GDM) in third trimester 2023     Priority: High     Overview Note:     Supplies and referral sent 23 [facility]: On elevated parameters: FBG<95, PP<140, BID testing - fasting plus 1 rotating meal daily. Assessment & Plan Note:     Gestational Diabetes Mellitus  Gestational DM develops during pregnancy in women whose pancreatic function is insufficient to overcome the insulin resistance associated with the pregnant state. Among the main consequences are increased risks of preeclampsia, macrosomia, and  delivery, and their associated morbidities. The risks of these outcomes increase as maternal fasting plasma glucose levels increase above 75 mg/dL. Patient counseled that insulin resistance will rise as pregnancy progresses. She was counseled re appropriate diet choices and activity. She has a moderate understanding and high desire to optimize her pregnancy's health. In addition, the diagnosis of gestational diabetes in pregnancy raises risk for maternal type 2 diabetes in future (up to 50% within 15 years) and cardiovascular disease. The best way to minimize these risks are to maintain diet changes and increase activity levels. Patients should establish care with a PCP for surveillance every 1-2 years to monitor for development of diabetes. This risk is modified by breast feeding for at least 6-12 months. Infants of pregnancies associated with gestational diabetes are at risk for  hypoglycemia due to increased insulin production to manage the glucose which crosses the placenta. In addition, they are at risk for obesity and altered glucose metabolism throughout their life. This risk is decreased with exposure to breast milk. In those affected by diabetes in pregnancy, consider initiation of  milk expression beginning at 37 weeks. Antepartum Recommendations:  Continue to monitor glycemic control 4 times daily- fasting and 1 hour after starting each meal.  Send glucose logs to [facility] and primary OB each week. Patient will return to [facility] for growth in ~3 weeks. Will determine need for  testing in 3rd trimester depending upon maternal and fetal conditions. Plan serial growth scans. Intrapartum Recommendations:  Check glucose every every 4 hours during cervical ripening, every 2hr in latent labor, and every 1hr in active labor. Goal of glucose  while in labor, adjust IVF and insulin as needed. Postpartum and Longterm Recommendations:   May discontinue medical therapy for gestational DM at delivery. Continue testing BG for 24 hrs post-partum. If normal, discontinue at that time. Repeat 2 hr Glucose Tolerance Test at 4-12 weeks post-partum and every 1-2 years.   If unable to tolerate 2-hr GTT, may check fasting glucose 2-3 times in one week along with a hemoglobin a1c. ADA diabetes diagnostic criteria are any of the following: Hgb A1c > 6.5%, or Fasting > 126, or 2 pp > 200, or Random > 200 with symptoms  Alternatively, can consider 2hr GTT while in hospital postpartum. Recommend patient establish care with a primary care provider. Pt should be evaluated annually for development of diabetes. Reviewed log, all readings WNL with using elevated parameters. Continue diet control with ELEVATED PARAMETERS; BID testing - fasting plus 1 rotating meal daily. 30 minutes spend on diabetic education by ADCES. High-risk pregnancy in third trimester 09/08/2022     Priority: High     Overview Note:     Declined genetic testing. COVID-not vaccinated  Flu- given  Tdap- given 1/16/23 2/9/2023 at [facility]: Appropriate growth; AC 16%, Overall 31%, MARLI 13.5 cm, UA Dopplers WNL, BPP 8/8. Return to [facility] in 3 weeks for growth/BPP/GDM. See GDM Overview for log review and recommendations. DM managed remotely by CDE with KENNY Bradford to contact pt for hx PP anxiety/depression       Assessment & Plan Note:     Genetic counseling was performed by physician after reviewing patient's genetic history. The patient's Down syndrome age associated risk, as well as, risks of additional aneuploidy and genetic syndromes, are reduced by approximately 50% with a normal anatomy ultrasound. Ultrasound alone does not rule out all abnormalities of genetics and development. Maternal serum screening for aneuploidy was discussed with the patient including first trimester EDWARD-A/hCG, second trimester Quad screen (either in isolation or sequential with EDWARD-A) as well as non-invasive prenatal testing (NIPT) for aneuploidy from a maternal blood sample. Positive predictive and negative predictive values for these tests were explained, questions answered.  Patient understands that these are screening tests that only assesses risk for select abnormalities (trisomies 15, 25, and 21, and sex chromosome abnormalities (NIPT), as well as markers for placental health (EDWARD-A) and risk for open neural tube defects (quad)). NIPT is designed for high risk populations, but should be considered by all patients who desire the current best option for screening for applicable genetic abnormalities. Limitations of technology discussed based on maternal age, technical aspects of tests, and maternal BMI reviewed. All questions answered and concerns discussed. Patient elected to proceed with Ultrasound only with no serum screening. History of postpartum depression 2022     Priority: Medium     Overview Note:     Never taken meds, but states had after both deliveries. Assessment & Plan Note:      Anxiety and Depression  The approach to depression and anxiety in pregnancy must look at the whole maternal-child cohort to assess risks and benefits.  depression is associated with an increased risk of multiple poor obstetrical outcomes, including spontaneous , bleeding, operative deliveries, and  birth. In a nationally representative survey in the [place] that identified pregnant women with major depression, only 50 percent received treatment. Untreated disease causes maternal suffering and is associated with poor nutrition, comorbid substance use disorders, poor adherence with prenatal care, postpartum depression, impaired relationships between the mother and her infant and other family members, and an increased risk of suicide. It is important to assess the benefit of previous treatment in order to guide treatment selection. If psychotherapy is indicated and the patient was successfully treated with a particular psychotherapy prior to pregnancy, the same therapy is used during pregnancy.    Similarly, if pharmacotherapy is indicated and the patient was successfully treated with a particular antidepressant prior to pregnancy, the same drug is used during pregnancy. The risks of untreated moderate to severe maternal major depression, to both the mother and fetus, often outweigh the risks associated with antidepressants. A national registry study (nearly 1,300,000 births) compared infants who were exposed to SSRIs in early pregnancy (n >10,000) with infants not exposed. After adjusting for potential confounds (eg, maternal age, smoking, and body mass index), the analyses found that the risk of severe congenital malformations was comparable in the two groups. Antidepressant drug doses may need to be increased as the pregnancy progresses, especially during the third trimester. There is no evidence that tapering or discontinuing antidepressants at term reduces the risk of  complications, and tapering or stopping antidepressants can increase the maternal risk of  relapse. It is generally agreed the risks of  depression (especially recurrent episodes) exceed the risks of  complications. It is important to continue to monitor mood in the  period, as more than 20% women will struggle with depression or other mood issues in pregnancy/postpartum. Those with a history will be at a much higher risk for exacerbation with the hormonal fluctuations of this period. Good support system. But open to additional resources. Mood evaluated today; PHQ2 reviewed. Counseling re possibility of peripartum worsening. Mood Reassuring today  Recommend lifestyle/behavioral modifications to enhance mood (exercise, sunshine, mood apps, nutritional modifications, etc). Offered OB Care Coordination with Radha Espinoza LCSW to aid in obtaining mental health care. Maternal varicella, non-immune 2022     Priority: Low    History of pre-eclampsia 2022     Priority: Low     Overview Note:     With G1.   Start  mg and Vit D 2000 iu at 12 weeks until 36w  Baseline labs-wnl   11/8/22 - Urine protein creatinine ratio is too low to calculate        Family history of genetic disorder 09/08/2022     Priority: Low     Overview Note:     Patient brother has Rubinstein-Taybi syndrome          Heart murmur 07/24/2017     Priority: Low     Overview Note:     Patient states benign. History of polycystic ovarian syndrome 11/12/2014     Priority: Low     Overview Note:     18 wk GTT- 122    28 wk GTT- failed 1-hr and 3-hr      Encounter for immunization 11/08/2022     Overview Note:     Flu vaccine- given 11/8/22        Addressed normal pregnancy complaints, reassured and offered suggestions for care  Reviewed gestational age precautions and activity goals/limitations  Nutritional counseling as well as specific goals based on current maternal and fetal status  Options for GERD therapy if becomes symptomatic over course of pregnancy  Gestational age appropriate preventive care regarding communicable disease transmission and vaccines as appropriate (including flu, TDaP, and COVID.)  Additional plans and concerns as documented in problem list.   All questions answered and concerns discussed. An electronic signature was used to authenticate this note. Lashae Frederick MD    I have spent 62 minutes reviewing previous notes, test results and face to face with the patient discussing the diagnosis and importance of compliance with the treatment plan, in addition to ultrasound findings, as well as documenting on the day of the visit (02/09/2023). Return in about 3 weeks (around 3/2/2023) for Growth, BPP, GDM F/U.     Patient Active Problem List   Diagnosis Code    Heart murmur R01.1    History of polycystic ovarian syndrome Z87.42    History of pre-eclampsia Z87.59    History of postpartum depression Z87.59, Z86.59    Family history of genetic disorder Z84.89    High-risk pregnancy in third trimester O09.93    Encounter for immunization Z23 Maternal varicella, non-immune O09.899, Z28.39    Gestational diabetes mellitus (GDM) in third trimester O24.419     Visit Diagnoses         Codes    31 weeks gestation of pregnancy     Z3A.31    BMI 32.0-32.9,adult     Z68.32    Encounter for screening for maternal depression     Z13.32

## 2023-02-10 ENCOUNTER — FOLLOWUP TELEPHONE ENCOUNTER (OUTPATIENT)
Dept: CASE MANAGEMENT | Age: 33
End: 2023-02-10

## 2023-02-10 NOTE — TELEPHONE ENCOUNTER
Request received from [facility] to reach out to patient. Phone call to patient at [phone]. No answer; message left requesting call back.       ELENITA Weller, [address] [facility]   [phone]

## 2023-02-12 NOTE — ASSESSMENT & PLAN NOTE
Anxiety and Depression  The approach to depression and anxiety in pregnancy must look at the whole maternal-child cohort to assess risks and benefits.  depression is associated with an increased risk of multiple poor obstetrical outcomes, including spontaneous , bleeding, operative deliveries, and  birth. In a nationally representative survey in [facility] that identified pregnant women with major depression, only 50 percent received treatment. Untreated disease causes maternal suffering and is associated with poor nutrition, comorbid substance use disorders, poor adherence with prenatal care, postpartum depression, impaired relationships between the mother and her infant and other family members, and an increased risk of suicide. It is important to assess the benefit of previous treatment in order to guide treatment selection.  If psychotherapy is indicated and the patient was successfully treated with a particular psychotherapy prior to pregnancy, the same therapy is used during pregnancy.  Similarly, if pharmacotherapy is indicated and the patient was successfully treated with a particular antidepressant prior to pregnancy, the same drug is used during pregnancy.  The risks of untreated moderate to severe maternal major depression, to both the mother and fetus, often outweigh the risks associated with antidepressants. A national registry study (nearly 1,300,000 births) compared infants who were exposed to SSRIs in early pregnancy (n >10,000) with infants not exposed. After adjusting for potential confounds (eg, maternal age, smoking, and body mass index), the analyses found that the risk of severe congenital malformations was comparable in the two groups. Antidepressant drug doses may need to be increased as the pregnancy progresses, especially during the third trimester.  There is no evidence that tapering or discontinuing antidepressants at term reduces the risk of  complications, and tapering or stopping antidepressants can increase the maternal risk of  relapse. It is generally agreed the risks of  depression (especially recurrent episodes) exceed the risks of  complications. It is important to continue to monitor mood in the  period, as more than 20% women will struggle with depression or other mood issues in pregnancy/postpartum. Those with a history will be at a much higher risk for exacerbation with the hormonal fluctuations of this period. Good support system. But open to additional resources. Mood evaluated today; PHQ2 reviewed. Counseling re possibility of peripartum worsening. Mood Reassuring today  Recommend lifestyle/behavioral modifications to enhance mood (exercise, sunshine, mood apps, nutritional modifications, etc). Offered OB Care Coordination with Portia Jimenez LCSW to aid in obtaining mental health care.

## 2023-02-12 NOTE — ASSESSMENT & PLAN NOTE
Genetic counseling was performed by physician after reviewing patient's genetic history. The patient's Down syndrome age associated risk, as well as, risks of additional aneuploidy and genetic syndromes, are reduced by approximately 50% with a normal anatomy ultrasound. Ultrasound alone does not rule out all abnormalities of genetics and development. Maternal serum screening for aneuploidy was discussed with the patient including first trimester EDWARD-A/hCG, second trimester Quad screen (either in isolation or sequential with EDWARD-A) as well as non-invasive prenatal testing (NIPT) for aneuploidy from a maternal blood sample. Positive predictive and negative predictive values for these tests were explained, questions answered. Patient understands that these are screening tests that only assesses risk for select abnormalities (trisomies 15, 25, and 21, and sex chromosome abnormalities (NIPT), as well as markers for placental health (EDWARD-A) and risk for open neural tube defects (quad)). NIPT is designed for high risk populations, but should be considered by all patients who desire the current best option for screening for applicable genetic abnormalities. Limitations of technology discussed based on maternal age, technical aspects of tests, and maternal BMI reviewed. All questions answered and concerns discussed. Patient elected to proceed with Ultrasound only with no serum screening.

## 2023-02-13 ENCOUNTER — FOLLOWUP TELEPHONE ENCOUNTER (OUTPATIENT)
Dept: CASE MANAGEMENT | Age: 33
End: 2023-02-13

## 2023-02-13 ENCOUNTER — ROUTINE PRENATAL (OUTPATIENT)
Dept: OBGYN CLINIC | Age: 33
End: 2023-02-13

## 2023-02-13 VITALS — DIASTOLIC BLOOD PRESSURE: 68 MMHG | BODY MASS INDEX: 33.3 KG/M2 | WEIGHT: 212.6 LBS | SYSTOLIC BLOOD PRESSURE: 100 MMHG

## 2023-02-13 DIAGNOSIS — Z3A.32 32 WEEKS GESTATION OF PREGNANCY: ICD-10-CM

## 2023-02-13 DIAGNOSIS — Z13.89 SCREENING FOR GENITOURINARY CONDITION: ICD-10-CM

## 2023-02-13 DIAGNOSIS — Z34.83 PRENATAL CARE, SUBSEQUENT PREGNANCY, THIRD TRIMESTER: Primary | ICD-10-CM

## 2023-02-13 LAB
GLUCOSE URINE, POC: NEGATIVE
PROTEIN,URINE, POC: NEGATIVE

## 2023-02-13 PROCEDURE — 99902 PR PRENATAL VISIT: CPT | Performed by: OBSTETRICS & GYNECOLOGY

## 2023-02-13 NOTE — CARE COORDINATION
Voicemail received from patient. Phone call to patient at [phone]. Overall, patient states, \"I feel like I'm doing okay. \"  Discussed emotional impact of patient's recent diagnosis of gestational diabetes. Per patient, \"Postpartum is rough for me\" as she experienced significant depression after the births of her first 2 children. Patient was provided the opportunity to share about this history. Explored current steps that she is taking to utilize her support system and prepare for the birth of her . Patient has a strong group of friends, and her  is a significant source of support. Psycho-education provided on treatment options for  depression/anxiety. Patient agreeable for  to provide information on resources for both medication management and talk therapy. Information sent via text. [employer]  [phone] (text or call)  www.[employer]. Felicia Del Cid    [facility] [employer] [address] [facility]  www.[employer]    SW educated patient on mental health support available thru [facility]'s [facility] Program (7-578.838.1044). Patient agreeable for  to provide contact information for program.  Patient is aware that she is to contact program for additional information. She will then be contacted by a [facility] Coordinator to be enrolled in support services. SW will follow-up with patient next week. Additionally, patient has this 's contact information for any needs/quetsions.       ELENITA Orosco,  [facility]   [phone]

## 2023-02-20 ENCOUNTER — TELEPHONE (OUTPATIENT)
Dept: OBGYN CLINIC | Age: 33
End: 2023-02-20
Payer: COMMERCIAL

## 2023-02-20 DIAGNOSIS — O24.410 DIET CONTROLLED GESTATIONAL DIABETES MELLITUS (GDM) IN THIRD TRIMESTER: Primary | ICD-10-CM

## 2023-02-20 PROCEDURE — 99091 COLLJ & INTERPJ DATA EA 30 D: CPT | Performed by: OBSTETRICS & GYNECOLOGY

## 2023-02-20 NOTE — TELEPHONE ENCOUNTER
Weekly blood sugar log reviewed. Contacted pt and advised them to continue diet control - elevated parameters as stated in the GDM A/P.    30 minutes spend on glucose interpretation and communication with patient by Mary Keller.

## 2023-02-20 NOTE — ASSESSMENT & PLAN NOTE
Log reviewed from 2/9-2/16, most readings are WNL with a few over 140. Most FBGs WNL. Continue diet control, BID testing with elevated parameters: FBG < 95 and PPs < 140.

## 2023-02-21 ENCOUNTER — FOLLOWUP TELEPHONE ENCOUNTER (OUTPATIENT)
Dept: CASE MANAGEMENT | Age: 33
End: 2023-02-21

## 2023-02-21 NOTE — TELEPHONE ENCOUNTER
Phone call to patient at [phone]. No answer; message left requesting call back.      ELENITA Andrews, [address] [facility]   [phone]

## 2023-02-27 ENCOUNTER — TELEPHONE (OUTPATIENT)
Dept: OBGYN CLINIC | Age: 33
End: 2023-02-27
Payer: COMMERCIAL

## 2023-02-27 ENCOUNTER — ROUTINE PRENATAL (OUTPATIENT)
Dept: OBGYN CLINIC | Age: 33
End: 2023-02-27

## 2023-02-27 VITALS — BODY MASS INDEX: 33.42 KG/M2 | SYSTOLIC BLOOD PRESSURE: 102 MMHG | DIASTOLIC BLOOD PRESSURE: 60 MMHG | WEIGHT: 213.4 LBS

## 2023-02-27 DIAGNOSIS — Z13.89 SCREENING FOR GENITOURINARY CONDITION: ICD-10-CM

## 2023-02-27 DIAGNOSIS — Z3A.34 34 WEEKS GESTATION OF PREGNANCY: ICD-10-CM

## 2023-02-27 DIAGNOSIS — O24.410 DIET CONTROLLED GESTATIONAL DIABETES MELLITUS (GDM) IN THIRD TRIMESTER: ICD-10-CM

## 2023-02-27 DIAGNOSIS — O09.93 HIGH-RISK PREGNANCY IN THIRD TRIMESTER: Primary | ICD-10-CM

## 2023-02-27 LAB
GLUCOSE URINE, POC: NEGATIVE
PROTEIN,URINE, POC: NEGATIVE

## 2023-02-27 PROCEDURE — 99091 COLLJ & INTERPJ DATA EA 30 D: CPT | Performed by: OBSTETRICS & GYNECOLOGY

## 2023-02-27 PROCEDURE — 99902 PR PRENATAL VISIT: CPT | Performed by: OBSTETRICS & GYNECOLOGY

## 2023-02-27 NOTE — TELEPHONE ENCOUNTER
Weekly blood sugar log reviewed. Contacted pt and advised them to continue diet control - elevated parameters as stated in the GDM A/P.    30 minutes spend on glucose interpretation and communication with patient by Andrae Olguin.

## 2023-02-27 NOTE — PROGRESS NOTES
[facility] [facility]. Reassured regarding concerns. PP moods were problematic prior pregnancy. She has started Zoloft, psych appt next week.

## 2023-03-06 ENCOUNTER — FOLLOWUP TELEPHONE ENCOUNTER (OUTPATIENT)
Dept: CASE MANAGEMENT | Age: 33
End: 2023-03-06

## 2023-03-06 ENCOUNTER — ROUTINE PRENATAL (OUTPATIENT)
Dept: OBGYN CLINIC | Age: 33
End: 2023-03-06
Payer: COMMERCIAL

## 2023-03-06 VITALS — DIASTOLIC BLOOD PRESSURE: 66 MMHG | SYSTOLIC BLOOD PRESSURE: 106 MMHG

## 2023-03-06 DIAGNOSIS — Z13.32 ENCOUNTER FOR SCREENING FOR MATERNAL DEPRESSION: ICD-10-CM

## 2023-03-06 DIAGNOSIS — Z87.59 HISTORY OF PRE-ECLAMPSIA: ICD-10-CM

## 2023-03-06 DIAGNOSIS — O26.813 FATIGUE DURING PREGNANCY IN THIRD TRIMESTER: ICD-10-CM

## 2023-03-06 DIAGNOSIS — Z87.59 HISTORY OF POSTPARTUM DEPRESSION: ICD-10-CM

## 2023-03-06 DIAGNOSIS — O09.93 HIGH-RISK PREGNANCY IN THIRD TRIMESTER: ICD-10-CM

## 2023-03-06 DIAGNOSIS — Z87.42 HISTORY OF POLYCYSTIC OVARIAN SYNDROME: ICD-10-CM

## 2023-03-06 DIAGNOSIS — O24.410 DIET CONTROLLED GESTATIONAL DIABETES MELLITUS (GDM) IN THIRD TRIMESTER: Primary | ICD-10-CM

## 2023-03-06 DIAGNOSIS — Z86.59 HISTORY OF POSTPARTUM DEPRESSION: ICD-10-CM

## 2023-03-06 DIAGNOSIS — Z84.89 FAMILY HISTORY OF GENETIC DISORDER: ICD-10-CM

## 2023-03-06 PROCEDURE — 99215 OFFICE O/P EST HI 40 MIN: CPT | Performed by: OBSTETRICS & GYNECOLOGY

## 2023-03-06 PROCEDURE — 96127 BRIEF EMOTIONAL/BEHAV ASSMT: CPT | Performed by: OBSTETRICS & GYNECOLOGY

## 2023-03-06 PROCEDURE — 76819 FETAL BIOPHYS PROFIL W/O NST: CPT | Performed by: OBSTETRICS & GYNECOLOGY

## 2023-03-06 PROCEDURE — 76816 OB US FOLLOW-UP PER FETUS: CPT | Performed by: OBSTETRICS & GYNECOLOGY

## 2023-03-06 ASSESSMENT — PATIENT HEALTH QUESTIONNAIRE - PHQ9
SUM OF ALL RESPONSES TO PHQ9 QUESTIONS 1 & 2: 2
SUM OF ALL RESPONSES TO PHQ QUESTIONS 1-9: 2
2. FEELING DOWN, DEPRESSED OR HOPELESS: 1
1. LITTLE INTEREST OR PLEASURE IN DOING THINGS: 1

## 2023-03-06 NOTE — TELEPHONE ENCOUNTER
Voicemail received from patient. Phone call to patient at [phone]. No answer; message left requesting call back.      ELENITA Welch, [address] [facility]   [phone]

## 2023-03-06 NOTE — PROGRESS NOTES
MFM Follow-up Visit    Grecia Serrano (: 1990) is a 34 y.o. [id number] at 35w3d with 2023, by Last Menstrual Period. Presents for evaluation of the following chief complaint(s):  Pregnancy Ultrasound and High Risk Pregnancy (GDM, H/O PreE, H/O Postpartum/ )     Patient is working full time as a  in the virtual program at [employer]. Scheduled to see primary OB ([facility]) on 2023. Spouse present today. GDM:  Patient with recent diagnosis of gestational diabetes. Full details of current status in A/P section of problem list. Patient underwent [employer] on 2023. Improved fetal growth with lessening of strict control. No Has. LLE>RLE edema- appears normal for 3rd tri preg, but if worsens, recommend Doppler LLE. Denies preeclamptic symptoms. Reports good fetal movement. No bleeding, LOF, cramping, ctxs, or vaginal pressure. Reports leg cramps at night; suggested stretching and Magnesium and Calcium. Interval history since prior appt reviewed and updated as indicated. Review of Systems - per HPI; otherwise unremarkable. Exam:     Vitals:    23 1518   BP: 106/66     Recent Mood: well  Recent Labs reviewed and addressed. Ultrasound: Please see formal ultrasound report under imaging tab. ASSESSMENT/PLAN:  Patient Active Problem List    Diagnosis Date Noted    Gestational diabetes mellitus (GDM) in third trimester 2023     Priority: High     Overview Note:     Supplies and referral sent 23 [facility]: On elevated parameters: FBG<95, PP<140, BID testing - fasting plus 1 rotating meal daily. 23 [facility]: Glucose log reviewed. BS ranges . BID testing-fasting plus 1 rotating meal daily. 23 [facility]: Glucose log reviewed. BS ranges 77 to 157. BID testing fasting plus rotating meal daily. Elevated parameters <140. Assessment & Plan Note:     Diet and BG log reviewed.  Glucoses are well controlled. .    Recommend BG testing as noted in problem list and send logs weekly to OB and MFM offices. At this time, patient should monitor- 2 times daily- fasting and 1 hour after meals rotating through all meals over 3 days. , 1 hour postprandial goal <140. and Fasting <95. Will add/adjust medication if elevations become consistent. diet   Recommend delivery In 39th week, sooner if maternal or fetal concerns   testing at least weekly BPP beginning at 36 weeks. May begin  milk expression program at 37 weeks. Pt to contact lactation consultants re this program in 35-36th weeks. Intrapartum Recommendations:  Check glucose every 2hr in latent labor, 1hr in active labor. Goal of glucose  while in labor, adjust IVF and insulin as needed. Postpartum Recommendations:   May discontinue medical therapy for gestational DM at delivery. Continue testing BG for 24 hrs post-partum. If normal, discontinue at that time. Repeat 2 hr Glucose Tolerance Test at 4-12 weeks post-partum and every 1-2 years. If unable to tolerate 2-hr GTT, may check fasting glucose 2-3 times in one week along with a hemoglobin a1c. ADA diabetes diagnostic criteria are any of the following: Hgb A1c > 6.5%, or Fasting > 126, or 2 pp > 200, or Random > 200 with symptoms  Alternatively, can consider 2hr GTT while in hospital postpartum. Pt should establish care with primary care provider and should be evaluated annually for development of diabetes. High-risk pregnancy in third trimester 2022     Priority: High     Overview Note:     Declined genetic testing. COVID-not vaccinated  Flu- given  Tdap- given 23 at [facility]: Appropriate growth; AC 16%, Overall 31%, MARLI 13.5 cm, UA Dopplers WNL, BPP .  23 [facility]: Improved fetal growth; AC 20%, Overall 22%, MARLI 21 cm, Dopplers WNL, BPP     See GDM Overview for log review and recommendations.   DM managed remotely by CDE with CL.  Mik Olivares to contact pt for hx PP anxiety/depression       Assessment & Plan Note:     No follow up MFM-refer back as needed      Fatigue during pregnancy in third trimester 03/06/2023     Priority: Medium     Overview Note:     Iron samples; [place] 3[place]14      History of postpartum depression 09/08/2022     Priority: Medium     Overview Note:     Never taken meds, but states had after both deliveries. Has set up care with [facility]; zoloft 50 daily      Maternal varicella, non-immune 11/11/2022     Priority: Low    History of pre-eclampsia 09/08/2022     Priority: Low     Overview Note:     With G1. Start  mg and Vit D 2000 iu at 12 weeks until 36w  Baseline labs-wnl   11/8/22 - Urine protein creatinine ratio is too low to calculate        Family history of genetic disorder 09/08/2022     Priority: Low     Overview Note:     Patient brother has Rubinstein-Taybi syndrome          Heart murmur 07/24/2017     Priority: Low     Overview Note:     Patient states benign. History of polycystic ovarian syndrome 11/12/2014     Priority: Low     Overview Note:     18 wk GTT- 122    28 wk GTT- failed 1-hr and 3-hr      Encounter for immunization 11/08/2022     Overview Note:     Flu vaccine- given 11/8/22          Mood evaluated today; PHQ2 reviewed. Counseling re possibility of peripartum worsening. Mood Reassuring today  Recommend lifestyle/behavioral modifications to enhance mood (exercise, sunshine, mood apps, nutritional modifications, etc). As mood stable and depression/anxiety well-controlled, will not change medications today.   Established with [facility] normal pregnancy complaints, reassured and offered suggestions for care  Reviewed gestational age precautions and activity goals/limitations  Nutritional counseling as well as specific goals based on current maternal and fetal status  Options for GERD therapy if becomes symptomatic over course of pregnancy  Gestational age appropriate preventive care regarding communicable disease transmission and vaccines as appropriate (including flu, TDaP, and COVID.)  Additional plans and concerns as documented in problem list.   All questions answered and concerns discussed. An electronic signature was used to authenticate this note. Bernardo Chu MD    I have spent 45 minutes reviewing previous notes, test results and face to face with the patient discussing the diagnosis and importance of compliance with the treatment plan, in addition to ultrasound findings, as well as documenting on the day of the visit (03/06/2023). Return if symptoms worsen or fail to improve.     Patient Active Problem List   Diagnosis Code    Heart murmur R01.1    History of polycystic ovarian syndrome Z87.42    History of pre-eclampsia Z87.59    History of postpartum depression Z87.59, Z86.59    Family history of genetic disorder Z84.89    High-risk pregnancy in third trimester O09.93    Encounter for immunization Z23    Maternal varicella, non-immune O09.899, Z28.39    Gestational diabetes mellitus (GDM) in third trimester O24.419    Fatigue during pregnancy in third trimester O26.813     Visit Diagnoses         Codes    Encounter for screening for maternal depression     Z13.32

## 2023-03-06 NOTE — ASSESSMENT & PLAN NOTE
Diet and BG log reviewed. Glucoses are well controlled. .    · Recommend BG testing as noted in problem list and send logs weekly to OB and MFM offices. · At this time, patient should monitor- 2 times daily- fasting and 1 hour after meals rotating through all meals over 3 days. , 1 hour postprandial goal <140. and Fasting <95. · Will add/adjust medication if elevations become consistent. diet   · Recommend delivery In 39th week, sooner if maternal or fetal concerns  ·  testing at least weekly BPP beginning at 36 weeks. · May begin  milk expression program at 37 weeks. Pt to contact lactation consultants re this program in 35-36th weeks. Intrapartum Recommendations:  · Check glucose every 2hr in latent labor, 1hr in active labor. · Goal of glucose  while in labor, adjust IVF and insulin as needed. Postpartum Recommendations:   · May discontinue medical therapy for gestational DM at delivery. · Continue testing BG for 24 hrs post-partum. If normal, discontinue at that time. · Repeat 2 hr Glucose Tolerance Test at 4-12 weeks post-partum and every 1-2 years. · If unable to tolerate 2-hr GTT, may check fasting glucose 2-3 times in one week along with a hemoglobin a1c.   · ADA diabetes diagnostic criteria are any of the following: Hgb A1c > 6.5%, or Fasting > 126, or 2 pp > 200, or Random > 200 with symptoms  · Alternatively, can consider 2hr GTT while in hospital postpartum. · Pt should establish care with primary care provider and should be evaluated annually for development of diabetes.

## 2023-03-14 ENCOUNTER — ROUTINE PRENATAL (OUTPATIENT)
Dept: OBGYN CLINIC | Age: 33
End: 2023-03-14

## 2023-03-14 VITALS — SYSTOLIC BLOOD PRESSURE: 118 MMHG | DIASTOLIC BLOOD PRESSURE: 80 MMHG | WEIGHT: 216.2 LBS | BODY MASS INDEX: 33.86 KG/M2

## 2023-03-14 DIAGNOSIS — Z34.93 PRENATAL CARE, THIRD TRIMESTER: Primary | ICD-10-CM

## 2023-03-14 DIAGNOSIS — Z36.85 ENCOUNTER FOR ANTENATAL SCREENING FOR STREPTOCOCCUS B: ICD-10-CM

## 2023-03-14 DIAGNOSIS — Z13.89 SCREENING FOR GENITOURINARY CONDITION: ICD-10-CM

## 2023-03-14 DIAGNOSIS — O26.813 FATIGUE DURING PREGNANCY IN THIRD TRIMESTER: ICD-10-CM

## 2023-03-14 DIAGNOSIS — Z3A.36 36 WEEKS GESTATION OF PREGNANCY: ICD-10-CM

## 2023-03-14 LAB
GLUCOSE URINE, POC: NEGATIVE
HGB BLD-MCNC: 10.8 G/DL (ref 11.7–15.4)
PROTEIN,URINE, POC: NEGATIVE

## 2023-03-14 PROCEDURE — 99902 PR PRENATAL VISIT: CPT | Performed by: OBSTETRICS & GYNECOLOGY

## 2023-03-14 RX ORDER — BUTYROSPERMUM PARKII(SHEA BUTTER), SIMMONDSIA CHINENSIS (JOJOBA) SEED OIL, ALOE BARBADENSIS LEAF EXTRACT .01; 1; 3.5 G/100G; G/100G; G/100G
LIQUID TOPICAL
COMMUNITY

## 2023-03-14 RX ORDER — DOCUSATE SODIUM 100 MG/1
100 CAPSULE, LIQUID FILLED ORAL 2 TIMES DAILY
COMMUNITY

## 2023-03-14 NOTE — PROGRESS NOTES
Pt reports she was given iron supplements by MFM and reports she noticed it caused bilat hand and feet swelling and had a BP of 123/77 and reports she normally is 90s/60s and overall didn't feel well so discontinued the iron supplement. Hgb today per MFM. Pt c/o burning stabbing pain under Rt ribs, pt reports it does not feel like usual baby movement/discomfort. C/o Rt eye twitching x 7-9d. Reports it is intermittent but bothersome. GBS today.

## 2023-03-15 ENCOUNTER — TELEPHONE (OUTPATIENT)
Dept: OBGYN CLINIC | Age: 33
End: 2023-03-15

## 2023-03-15 NOTE — TELEPHONE ENCOUNTER
----- Message from Kaylene Hu DO sent at 3/15/2023 12:37 PM EDT -----  Hg is slightly lower at 10.8. 11 one month ago.   Daily oral iron but doubt reason for fatigue

## 2023-03-17 ENCOUNTER — HOSPITAL ENCOUNTER (OUTPATIENT)
Dept: LACTATION | Age: 33
Discharge: HOME | End: 2023-03-20
Payer: COMMERCIAL

## 2023-03-17 LAB
BACTERIA SPEC CULT: ABNORMAL
SERVICE CMNT-IMP: ABNORMAL

## 2023-03-17 PROCEDURE — S9443 LACTATION CLASS: HCPCS

## 2023-03-17 NOTE — LACTATION NOTE
Prenatal Expression Protocol (PEP)    Patient:  Jaye Paulson  :  [id number]  EDC:  2023  OBGYN:  [address]     Patient here today to learn the process for the Prenatal Expression Protocol. Patient was referred to the program by Dr. Gee Tran for GDM, diet. Reviewed protocol. Mom to express milk twice per day starting at 37 weeks. Mom is 37 weeks today. Reviewed hand expression technique. Reviewed milk collection and storage. Mom will bring milk to the hospital frozen. Written handout on PEP given. Discussed mom should discontinue PEP and call her MD if PEP leads to contractions. Mom provided with supplies for collection and storage of milk. Can call Lactation Services for more supplies as needed. Mom has a Max flow and old Pump in style breastpump. Reviewed use of pump and collection of colostrum. Advised mom to use hands on expression technique if using an electric pump.

## 2023-03-20 ENCOUNTER — TELEPHONE (OUTPATIENT)
Dept: OBGYN CLINIC | Age: 33
End: 2023-03-20
Payer: COMMERCIAL

## 2023-03-20 DIAGNOSIS — O24.410 DIET CONTROLLED GESTATIONAL DIABETES MELLITUS (GDM) IN THIRD TRIMESTER: Primary | ICD-10-CM

## 2023-03-20 PROBLEM — A49.1 GBS (GROUP B STREPTOCOCCUS) INFECTION: Status: ACTIVE | Noted: 2023-03-20

## 2023-03-20 PROCEDURE — 99091 COLLJ & INTERPJ DATA EA 30 D: CPT | Performed by: OBSTETRICS & GYNECOLOGY

## 2023-03-20 NOTE — ASSESSMENT & PLAN NOTE
Glucose Log Review  Log reviewed today through 3/20/23, most readings WNL with some mild elevations,  BID testing with elevated parameters: FBG < 95 and PPs < 140. Advised pt to watch the carb load on larger meals. 30 minutes spent on glucose log interpretation and review of diabetic education by [facility]. It is the standard of care that patients with diabetes in pregnancy have glycemic control monitored weekly. It helps us better manage your gestational diabetes and overall care for you and your baby! This may be done in person with the diabetic educator/physician or may occur virtually via email/[employer]. Please remember to send your glucose log weekly to [facility] either through [facility] or [email or web address] may receive a bill from [address] [place] [address] Street for this service.

## 2023-03-20 NOTE — TELEPHONE ENCOUNTER
Weekly blood sugar log reviewed. Contacted pt and advised them to continue diet control - elevated parameters as stated in the GDM A/P.    30 minutes spend on glucose interpretation and communication with patient by Natalie Nelson. It is the standard of care that patients with diabetes in pregnancy have glycemic control monitored weekly. This may be done in person with the diabetic educator/physician or may occur virtually via email/[employer]. You may receive a bill from [address] [place] [address] for this service.

## 2023-03-21 ENCOUNTER — ROUTINE PRENATAL (OUTPATIENT)
Dept: OBGYN CLINIC | Age: 33
End: 2023-03-21
Payer: COMMERCIAL

## 2023-03-21 VITALS — BODY MASS INDEX: 34.02 KG/M2 | WEIGHT: 217.2 LBS | DIASTOLIC BLOOD PRESSURE: 76 MMHG | SYSTOLIC BLOOD PRESSURE: 118 MMHG

## 2023-03-21 DIAGNOSIS — O26.813 FATIGUE DURING PREGNANCY IN THIRD TRIMESTER: ICD-10-CM

## 2023-03-21 DIAGNOSIS — Z36.85 ANTENATAL SCREENING FOR STREPTOCOCCUS B: ICD-10-CM

## 2023-03-21 DIAGNOSIS — Z13.89 SCREENING FOR GENITOURINARY CONDITION: ICD-10-CM

## 2023-03-21 DIAGNOSIS — O09.93 HIGH-RISK PREGNANCY IN THIRD TRIMESTER: ICD-10-CM

## 2023-03-21 DIAGNOSIS — Z3A.37 37 WEEKS GESTATION OF PREGNANCY: ICD-10-CM

## 2023-03-21 DIAGNOSIS — O24.410 DIET CONTROLLED GESTATIONAL DIABETES MELLITUS (GDM) IN THIRD TRIMESTER: Primary | ICD-10-CM

## 2023-03-21 PROBLEM — J30.9 ALLERGIC RHINITIS: Status: ACTIVE | Noted: 2023-03-21

## 2023-03-21 LAB
GLUCOSE URINE, POC: NEGATIVE
PROTEIN,URINE, POC: NEGATIVE

## 2023-03-21 PROCEDURE — 76819 FETAL BIOPHYS PROFIL W/O NST: CPT | Performed by: OBSTETRICS & GYNECOLOGY

## 2023-03-21 PROCEDURE — 99902 PR PRENATAL VISIT: CPT | Performed by: OBSTETRICS & GYNECOLOGY

## 2023-03-21 NOTE — PROGRESS NOTES
Need BPP weekly due to gestational diabetes.   We will need to repeat group B strep with sensitivities due to anaphylactic reaction to penicillin  BPP 8/8  MARLI 13

## 2023-03-26 LAB
BACTERIA SPEC CULT: ABNORMAL
SERVICE CMNT-IMP: ABNORMAL

## 2023-03-28 ENCOUNTER — PROCEDURE VISIT (OUTPATIENT)
Dept: OBGYN CLINIC | Age: 33
End: 2023-03-28

## 2023-03-28 ENCOUNTER — ROUTINE PRENATAL (OUTPATIENT)
Dept: OBGYN CLINIC | Age: 33
End: 2023-03-28

## 2023-03-28 ENCOUNTER — FOLLOWUP TELEPHONE ENCOUNTER (OUTPATIENT)
Dept: CASE MANAGEMENT | Age: 33
End: 2023-03-28

## 2023-03-28 VITALS — BODY MASS INDEX: 34.11 KG/M2 | SYSTOLIC BLOOD PRESSURE: 122 MMHG | WEIGHT: 217.8 LBS | DIASTOLIC BLOOD PRESSURE: 74 MMHG

## 2023-03-28 DIAGNOSIS — Z3A.38 38 WEEKS GESTATION OF PREGNANCY: ICD-10-CM

## 2023-03-28 DIAGNOSIS — Z13.89 SCREENING FOR GENITOURINARY CONDITION: ICD-10-CM

## 2023-03-28 DIAGNOSIS — O24.410 DIET CONTROLLED GESTATIONAL DIABETES MELLITUS (GDM) IN THIRD TRIMESTER: Primary | ICD-10-CM

## 2023-03-28 DIAGNOSIS — Z34.93 PRENATAL CARE, THIRD TRIMESTER: ICD-10-CM

## 2023-03-28 DIAGNOSIS — Z34.83 PRENATAL CARE, SUBSEQUENT PREGNANCY, THIRD TRIMESTER: Primary | ICD-10-CM

## 2023-03-28 DIAGNOSIS — O09.93 HIGH-RISK PREGNANCY IN THIRD TRIMESTER: ICD-10-CM

## 2023-03-28 LAB
GLUCOSE URINE, POC: NEGATIVE
PROTEIN,URINE, POC: NEGATIVE

## 2023-03-28 PROCEDURE — 99902 PR PRENATAL VISIT: CPT | Performed by: OBSTETRICS & GYNECOLOGY

## 2023-03-28 NOTE — TELEPHONE ENCOUNTER
Voicemail received from patient on 3/27/23. Phone call to patient at [phone]. Voicemail left.     ELENITA Morrissey, [facility]   [phone]

## 2023-03-30 ENCOUNTER — TELEPHONE (OUTPATIENT)
Dept: OBGYN CLINIC | Age: 33
End: 2023-03-30

## 2023-03-30 DIAGNOSIS — O24.410 DIET CONTROLLED GESTATIONAL DIABETES MELLITUS (GDM) IN THIRD TRIMESTER: Primary | ICD-10-CM

## 2023-03-30 NOTE — TELEPHONE ENCOUNTER
Weekly blood sugar log reviewed. Contacted pt and advised them to continue diet - elevated parameters as stated in the GDM A/P.    30 minutes spend on glucose interpretation and communication with patient by Kandice Rivas. It is the standard of care that patients with diabetes in pregnancy have glycemic control monitored weekly. This may be done in person with the diabetic educator/physician or may occur virtually via email/[employer]. You may receive a bill from [address] [place] [address] for this service.

## 2023-03-30 NOTE — ASSESSMENT & PLAN NOTE
BG log reviewed from 3/20 to 3/27, BGs consistently in an acceptable range, recommend continue diet control with elevated parameters.

## 2023-04-05 ENCOUNTER — ROUTINE PRENATAL (OUTPATIENT)
Dept: OBGYN CLINIC | Age: 33
End: 2023-04-05
Payer: COMMERCIAL

## 2023-04-05 VITALS — WEIGHT: 220.4 LBS | DIASTOLIC BLOOD PRESSURE: 86 MMHG | BODY MASS INDEX: 34.52 KG/M2 | SYSTOLIC BLOOD PRESSURE: 123 MMHG

## 2023-04-05 DIAGNOSIS — O09.93 HIGH-RISK PREGNANCY IN THIRD TRIMESTER: Primary | ICD-10-CM

## 2023-04-05 DIAGNOSIS — N89.8 VAGINAL DISCHARGE: ICD-10-CM

## 2023-04-05 DIAGNOSIS — Z13.89 SCREENING FOR GENITOURINARY CONDITION: ICD-10-CM

## 2023-04-05 DIAGNOSIS — Z3A.39 39 WEEKS GESTATION OF PREGNANCY: ICD-10-CM

## 2023-04-05 DIAGNOSIS — Z34.93 PRENATAL CARE, THIRD TRIMESTER: ICD-10-CM

## 2023-04-05 DIAGNOSIS — A49.1 GBS (GROUP B STREPTOCOCCUS) INFECTION: ICD-10-CM

## 2023-04-05 DIAGNOSIS — O24.410 DIET CONTROLLED GESTATIONAL DIABETES MELLITUS (GDM) IN THIRD TRIMESTER: ICD-10-CM

## 2023-04-05 LAB
GLUCOSE URINE, POC: NEGATIVE
PROTEIN,URINE, POC: NEGATIVE

## 2023-04-05 PROCEDURE — 99902 PR PRENATAL VISIT: CPT | Performed by: STUDENT IN AN ORGANIZED HEALTH CARE EDUCATION/TRAINING PROGRAM

## 2023-04-05 PROCEDURE — 76819 FETAL BIOPHYS PROFIL W/O NST: CPT | Performed by: STUDENT IN AN ORGANIZED HEALTH CARE EDUCATION/TRAINING PROGRAM

## 2023-04-05 NOTE — PROGRESS NOTES
BPP
NEPHROLOGY NEW PATIENT VISIT    PCP:Simran Luna MD    REFERRING PROVIDER:Simran Luna MD    DATE OF VISIT:1/24/2018      CHIEF COMPLAINT:    Kidney Problem (new pt)      HPI:Eric Orozco is a 50 year old male with DM/HTN/COPD/CAD who is here for evaluation for CKD.    DM-2 X 10 years,uncontrolled,unknown diabetic retinopathy.  HTN X 16 years,controlled.Reports home BP <135/85.  Current smoker.  CAD in 2008 s/p stent.  Hepc genotype 1a    Denies any kidney stones,bloody/frothy urine/history of ESTRELLA's/denies OTC nsaid use/herbal supplements.  + LUTS symptoms-2015 s/p left orchiectomy and prostate abscess and reports microscopic hematuria since then.Care everywhere reviewed but no records of UA's prior to 2015.      ROS:  Eye Problem(s):negative  Cardiovascular problem(s):dyspnea on exertion and SOB at rest  Respiratory problem(s):shortness of breath  Gastro-intestinal problem(s):negative GI  Genito-urinary problem(s):incontinence and foam in urine  Musculoskeletal problem(s):negative  Integumentary problem(s):negative  Neurological problem(s):numbness or tingling of hands and numbness or tingling of feet  Endocrine problem(s):diabetes  Hematologic and/or Lymphatic problem(s):negative    PAST MEDICAL HISTORY:    Past Medical History:   Diagnosis Date   • Allergy    • Arthritis    • Bronchitis    • COPD    • Coronary Artery Disease    • Diabetes Mellitus    • Fracture    • GERD    • High cholesterol    • Hypertension    • Liver disease    • Obesity    • Orchitis 6/6/2015   • Pneumonia    • Prostate abscess 7/30/2015   • RAD (reactive airway disease)    • Scrotal abscess 6/6/2015   • Sleep Apnea    • Tobacco abuse    • Urinary tract infection        FAMILY HISTORY:    Family History   Problem Relation Age of Onset   • Miscarriages / Stillbirths Mother    • Asthma Father    • COPD Father    • Heart disease Father    • Hypertension Father    • Diabetes Maternal Grandmother    • Arthritis Maternal 
Grandfather    • COPD Paternal Grandmother    • Diabetes Paternal Grandmother    • COPD Paternal Grandfather    • Diabetes Paternal Grandfather      no familial nephritis or 1st degree relative on dialysis/transplant    SOCIAL HISTORY:    Social History     Social History   • Marital status:      Spouse name: N/A   • Number of children: N/A   • Years of education: N/A     Occupational History   • Not on file.     Social History Main Topics   • Smoking status: Current Every Day Smoker     Packs/day: 0.75     Years: 34.00     Types: Cigarettes     Start date: 6/5/1978   • Smokeless tobacco: Never Used      Comment: has the tobacco quitline info already   • Alcohol use No   • Drug use: No   • Sexual activity: Not on file     Other Topics Concern   • Not on file     Social History Narrative   • No narrative on file   Marcos is HS educated,on disability,wife Malika is main support;active in summers;hobby-dogs;has 2 daughters who live in [place]/ grandkids-baby sits.    CURRENT MEDICATIONS:    Current Outpatient Prescriptions   Medication Sig Dispense Refill   • glimepiride (AMARYL) 4 MG tablet Take 1 tablet by mouth daily (before breakfast). 90 tablet 0   • sildenafil (VIAGRA) 50 MG tablet Take 0.5 tablets by mouth daily as needed for Erectile Dysfunction. 10 tablet 0   • budesonide/formoterol (SYMBICORT) 80-4.5 MCG/ACT inhaler Inhale 2 puffs into the lungs 2 times daily. 10.2 g 1   • metformin (GLUCOPHAGE) 1000 MG tablet TAKE ONE TABLET BY MOUTH TWICE DAILY AFTER MEALS 60 tablet 0   • atorvastatin (LIPITOR) 10 MG tablet Take 1 tablet by mouth daily. 30 tablet 2   • Insulin Pen Needle (ALFONSO PEN NEEDLES) 31G X 8 MM Misc to use with insulin pen 100 each 3   • albuterol (PROAIR HFA) 108 (90 Base) MCG/ACT inhaler Inhale 2 puffs into the lungs every 4 hours. 9 g 0   • carvedilol (COREG) 25 MG tablet TAKE 1 TABLET BY MOUTH 2 TIMES DAILY (WITH MEALS). 60 tablet 5   • insulin glargine (LANTUS SOLOSTAR) 100 UNIT/ML 
pregnancy in third trimester  Overview:  Declined genetic testing. COVID-not vaccinated  Flu- given  Tdap- given 1/16/23  IOL scheduled for pit on 4/12/23 2/9/2023 at [facility]: Appropriate growth; AC 16%, Overall 31%, MARLI 13.5 cm, UA Dopplers WNL, BPP 8/8.  03/06/23 [facility]: Improved fetal growth; AC 20%, Overall 22%, MARLI 21 cm, Dopplers WNL, BPP 8/8    See GDM Overview for log review and recommendations.  DM managed remotely by CDE with M. Viviana Cedenokes to contact pt for hx PP anxiety/depression  2. Prenatal care, third trimester  -     AMB POC OB URINE DIP  3. 39 weeks gestation of pregnancy  -     AMB POC OB URINE DIP  -     AMB POC US FETAL BIOPHYSICAL PROFILE W/O NON STRESS TESTING  4. Diet controlled gestational diabetes mellitus (GDM) in third trimester  Overview:  Supplies and referral sent 1/27/23 02/09/23 [facility]: On elevated parameters: FBG<95, PP<140, BID testing - fasting plus 1 rotating meal daily. 2/24/23 [facility]: Glucose log reviewed. BS ranges . BID testing-fasting plus 1 rotating meal daily. 03/06/23 [facility]: Glucose log reviewed. BS ranges 77 to 157. BID testing fasting plus rotating meal daily. Elevated parameters <140. Weekly BPP per [facility]    Orders:  -     AMB POC US FETAL BIOPHYSICAL PROFILE W/O NON STRESS TESTING  5. GBS (group B streptococcus) infection  Overview:  Positive on 3/14/23. Treat in labor. Susceptible to Vancomycin  6. Vaginal discharge  -     Nuswab Vaginitis (VG); Future  7.  Screening for genitourinary condition  -     AMB POC OB URINE DIP         Joi Hart MD
pen-injector Inject 50 Units into the skin nightly. 15 mL 5   • amLODIPine (NORVASC) 10 MG tablet Take 1 tablet by mouth daily. 90 tablet 1   • lisinopril (ZESTRIL) 10 MG tablet Take 1 tablet by mouth daily. 30 tablet 5   • albuterol (VENTOLIN HFA) 108 (90 BASE) MCG/ACT inhaler Inhale 2 puffs into the lungs every 4 hours as needed for Shortness of Breath. 1 Inhaler 0   • nitroGLYcerin (NITROSTAT) 0.4 MG SL tablet Place 1 tablet under the tongue every 5 minutes as needed for Chest pain. 90 tablet 12   • aspirin 325 MG tablet Take 650 mg by mouth daily.       No current facility-administered medications for this visit.        PHYSICAL EXAM:    Vital Signs:    Visit Vitals  BP (!) 198/100 (BP Location: [facility], Patient Position: Sitting, Cuff Size: Regular)   Pulse 72   Resp 20   Ht 5' 7\" (1.702 m)   Wt 93 kg   BMI 32.12 kg/m²     General:   Alert, cooperative, conversive in no acute distress.  Skin:  Warm and dry without rash.    Head:  Normocephalic-atraumatic.   Neck:  Trachea is midline. No adenopathy.  Normal thyroid without mass or tenderness.  Eyes:  Normal conjunctiva and sclera.  Pupils equal, round and reactive to light.  Extraocular movements intact.  Cardiovascular: regular rate and rhythm, S1, S2 normal, no murmur, click, rub or gallop  Respiratory:  Normal respiratory effort.  Clear to auscultation and percussion.  No wheezes, rales or rhonchi.  Gastrointestinal:  Soft and nontender.  Normal bowel sounds.  No hepatomegaly or splenomegaly. B/l renal artery bruits+  Extremities:  extremities normal, atraumatic, no cyanosis or edema  Neuro:   Orientated x 4.  No focal deficits or lateralizing signs.  Psychiatric:   Cooperative.  Appropriate mood & affect.    LABS/IMAGES:    Lab Results   Component Value Date    ALBUMIN 3.7 10/16/2017    CALCIUM 8.0 (L) 01/19/2018    CO2 29 01/19/2018    GLUCOSE 469 (HH) 01/19/2018    PHOS 3.8 07/29/2015    POTASSIUM 4.9 01/19/2018    SODIUM 130 (L) 01/19/2018    BUN 16 
01/19/2018    GFRNA >90 01/19/2018    GFRA >90 01/19/2018     Albumin (g/dL)   Date Value   10/16/2017 3.7     Lab Results   Component Value Date    WBC 8.4 01/19/2018    HCT 44.7 01/19/2018    HGB 15.1 01/19/2018     01/19/2018     No results found for: VITD25  No results found for: INTAC  Hemoglobin A1C (%)   Date Value   01/08/2018 8.9 (H)     Lab Results   Component Value Date    CHOLESTEROL 160 07/10/2017    HDL 30 (L) 07/10/2017    CALCLDL 74 07/10/2017    TRIGLYCERIDE 280 (H) 07/10/2017     No results found for: INTAC  Lab Results   Component Value Date/Time    PRCR 2,849 (H) 01/19/2018 09:42 AM         Urinalysis  1/19/18-pos glu/prot/rbc    Renal Imaging:  CT urogram 2015-  The kidneys are normal in size and demonstrate symmetric  enhancement. No renal parenchymal mass lesion either solid or cystic is  demonstrated. There is no evidence of urinary tract calculus disease.  There is no hydronephrosis nor hydroureter  Satisfactory appearances are demonstrated following the recent TURP and  prostate abscess drainage     There is quite marked aortoiliac occlusive disease      Echo:  No results found for: EF      ASSESSMENT/PLAN:  Eric Oroczo is a 50 year old male smoker with DM/HTN/COPD/CAD/aorto-iliac atherosclerosis/Hep C who is here for evaluation for CKD.    1.CKD Stage 1:  GFR>90  ;proteinuric,stable  2.Proteinuria-moderate,worsening-sec to diabetic kidney disease/htn/?RVD  3.Hypertension- controlled  4.Hep c    Plan:  Patient has multifactorial CKD-DM(no DR)/HTN/RVD/Hep C and chronic microscopic hematuria after his urological interventions.  -he understands high risk of decompensation of CKD with multiple risk factors and that controlling dm/htn with ACE/ARB and stopping smoking will help in preventing progression of CKD.  -recommend to PCP to prioritize and maximize lisinopril  Over non-compelling indication drugs like amlodipine.  -goal BP<135/85      Summary of RECOMMENDATIONS from today's 
visit-  recommend to PCP to prioritize and maximize lisinopril  Over non-compelling indication drugs like amlodipine.    Time spent in face-to-face encounter was 40 minutes at least half of which was counseling on ckd etiology/course and complications.All labs ordered via this office were reviewed with patient-patient verbalized understanding.      I will see the patient back in 1 Year or sooner if problems arise.    Instructions provided as documented in the After Visit Summary.Copy of today's visit forwarded to Simran Luna MD,Simran Luna MD      Thank you for the consult.  Nuvia Banks MD  Pager:[phone]

## 2023-04-06 ENCOUNTER — HOSPITAL ENCOUNTER (OUTPATIENT)
Age: 33
Discharge: HOME | End: 2023-04-06
Attending: OBSTETRICS & GYNECOLOGY | Admitting: OBSTETRICS & GYNECOLOGY
Payer: COMMERCIAL

## 2023-04-06 VITALS
WEIGHT: 220 LBS | OXYGEN SATURATION: 99 % | SYSTOLIC BLOOD PRESSURE: 113 MMHG | DIASTOLIC BLOOD PRESSURE: 73 MMHG | BODY MASS INDEX: 34.53 KG/M2 | HEIGHT: 67 IN | HEART RATE: 82 BPM | TEMPERATURE: 98 F | RESPIRATION RATE: 16 BRPM

## 2023-04-06 PROCEDURE — 99283 EMERGENCY DEPT VISIT LOW MDM: CPT

## 2023-04-06 PROCEDURE — 99284 EMERGENCY DEPT VISIT MOD MDM: CPT

## 2023-04-06 PROCEDURE — 59025 FETAL NON-STRESS TEST: CPT

## 2023-04-06 NOTE — DISCHARGE INSTRUCTIONS
learn more about \"Weeks 40 to 41 of Your Pregnancy: Care Instructions. \"  Current as of: November 9, 2022               Content Version: 13.6  © 8878-5127 [employer]. Care instructions adapted under license by [facility] ([facility]). If you have questions about a medical condition or this instruction, always ask your healthcare professional. [place] [address] any warranty or liability for your use of this information.

## 2023-04-06 NOTE — PROGRESS NOTES
Patient presents to [facility] with complaints of contractions for past 8 hours. States that she had her membranes swept in office yesterday. Denies VB and LOF. Reports good fetal movement. US and toco placed on soft, non-tender abdomen at this time. Ana JOYCE)

## 2023-04-06 NOTE — H&P
OB ED Provider Note    Name: Suresh De La Cruz MRN: [id number]     YOB: 1990  Age: 34 y.o. Sex: female      Subjective:     Reason for Presentation to [facility] ED:  contractions/possible labor    History of Present Illness: Ms. Ruy Velasquez is a 34 y.o.  at 37w11d gestation with Estimated Date of Delivery: 23. Her current obstetrical history is significant for gestational diabetes and 2 previous full-term vaginal deliveries, one of which was complicated by preeclampsia . Prenatal records reviewed. Complaining of regular contractions for about 10 hours. Had membranes swept in office yesterday. She reports good fetal movement. She denies vaginal bleeding. She denies leakage of fluid.       OB History    Para Term  AB Living   4 2 2   1 2   SAB IAB Ectopic Molar Multiple Live Births   1         2      # Outcome Date GA Lbr Carrington/2nd Weight Sex Delivery Anes PTL Lv   4 Current            3 Term 03/15/18 39w6d 05:11 :52 7 lb 5.5 oz (3.33 kg) M Vag-Spont  N JUANI   2 Term 16 38w4d  5 lb 15 oz (2.693 kg) F Vag-Spont  N       Complications: Preeclampsia   1 SAB              Past Medical History:   Diagnosis Date    Gestational diabetes 2023    Headache     Heart abnormality     heart murmur    Heart murmur     patient states is benign    Hypertension     Other ill-defined conditions(799.89)     Mono    PCOS (polycystic ovarian syndrome) 2014    Polycystic disease, ovaries     Postpartum depression     Preeclampsia     UTI (urinary tract infection)      Past Surgical History:   Procedure Laterality Date    WISDOM TOOTH EXTRACTION       Social History     Occupational History    Not on file   Tobacco Use    Smoking status: Never    Smokeless tobacco: Never   Vaping Use    Vaping Use: Never used   Substance and Sexual Activity    Alcohol use: No    Drug use: No    Sexual activity: Yes     Partners: Male     Birth control/protection: None        Allergies   Allergen

## 2023-04-06 NOTE — PROGRESS NOTES
Patient given discharge information at this time. Instructed to return to [facility] with LOF, VB more than spotting, DFM and or painful and frequent contractions.

## 2023-04-07 LAB
A VAGINAE DNA VAG QL NAA+PROBE: ABNORMAL SCORE
BVAB2 DNA VAG QL NAA+PROBE: ABNORMAL SCORE
C ALBICANS DNA VAG QL NAA+PROBE: POSITIVE
C GLABRATA DNA VAG QL NAA+PROBE: NEGATIVE
MEGA1 DNA VAG QL NAA+PROBE: ABNORMAL SCORE
SPECIMEN SOURCE: ABNORMAL
T VAGINALIS RRNA SPEC QL NAA+PROBE: NEGATIVE

## 2023-04-12 ENCOUNTER — APPOINTMENT (OUTPATIENT)
Dept: LABOR AND DELIVERY | Age: 33
End: 2023-04-12
Payer: COMMERCIAL

## 2023-04-12 ENCOUNTER — HOSPITAL ENCOUNTER (INPATIENT)
Age: 33
LOS: 2 days | Discharge: HOME | End: 2023-04-14
Attending: OBSTETRICS & GYNECOLOGY | Admitting: OBSTETRICS & GYNECOLOGY
Payer: COMMERCIAL

## 2023-04-12 PROBLEM — Z3A.40 40 WEEKS GESTATION OF PREGNANCY: Status: ACTIVE | Noted: 2023-04-12

## 2023-04-12 PROBLEM — Z37.9 NORMAL LABOR: Status: ACTIVE | Noted: 2023-04-12

## 2023-04-12 LAB
ABO + RH BLD: NORMAL
ANION GAP SERPL CALC-SCNC: 7 MMOL/L (ref 2–11)
BASE DEFICIT BLD-SCNC: 4.8 MMOL/L
BASE EXCESS BLD CALC-SCNC: 0.1 MMOL/L
BLOOD GROUP ANTIBODIES SERPL: NORMAL
BUN SERPL-MCNC: 6 MG/DL (ref 6–23)
CALCIUM SERPL-MCNC: 8.2 MG/DL (ref 8.3–10.4)
CHLORIDE SERPL-SCNC: 109 MMOL/L (ref 101–110)
CO2 SERPL-SCNC: 23 MMOL/L (ref 21–32)
CREAT SERPL-MCNC: 0.7 MG/DL (ref 0.6–1)
ERYTHROCYTE [DISTWIDTH] IN BLOOD BY AUTOMATED COUNT: 15 % (ref 11.9–14.6)
GLUCOSE BLD STRIP.AUTO-MCNC: 103 MG/DL (ref 65–100)
GLUCOSE BLD STRIP.AUTO-MCNC: 98 MG/DL (ref 65–100)
GLUCOSE SERPL-MCNC: 96 MG/DL (ref 65–100)
HCO3 BLD-SCNC: 25 MMOL/L (ref 22–26)
HCO3 BLDV-SCNC: 27.1 MMOL/L (ref 23–28)
HCT VFR BLD AUTO: 33.9 % (ref 35.8–46.3)
HGB BLD-MCNC: 11.2 G/DL (ref 11.7–15.4)
MCH RBC QN AUTO: 26.3 PG (ref 26.1–32.9)
MCHC RBC AUTO-ENTMCNC: 33 G/DL (ref 31.4–35)
MCV RBC AUTO: 79.6 FL (ref 82–102)
NRBC # BLD: 0 K/UL (ref 0–0.2)
PCO2 BLDCO: 51 MMHG (ref 32–68)
PCO2 BLDCO: 65 MMHG (ref 32–68)
PH BLDCO: 7.19 (ref 7.15–7.38)
PH BLDCO: 7.33 (ref 7.15–7.38)
PLATELET # BLD AUTO: 311 K/UL (ref 150–450)
PMV BLD AUTO: 11.3 FL (ref 9.4–12.3)
PO2 BLDCO: 13 MMHG
PO2 BLDCO: 26 MMHG
POTASSIUM SERPL-SCNC: 3.8 MMOL/L (ref 3.5–5.1)
RBC # BLD AUTO: 4.26 M/UL (ref 4.05–5.2)
SAO2 % BLD: 10 % (ref 95–98)
SAO2 % BLDV: 43.4 % (ref 65–88)
SERVICE CMNT-IMP: ABNORMAL
SERVICE CMNT-IMP: NORMAL
SODIUM SERPL-SCNC: 139 MMOL/L (ref 133–143)
SPECIMEN EXP DATE BLD: NORMAL
SPECIMEN TYPE: ABNORMAL
SPECIMEN TYPE: ABNORMAL
WBC # BLD AUTO: 13.8 K/UL (ref 4.3–11.1)

## 2023-04-12 PROCEDURE — 0UQMXZZ REPAIR VULVA, EXTERNAL APPROACH: ICD-10-PCS | Performed by: OBSTETRICS & GYNECOLOGY

## 2023-04-12 PROCEDURE — 82803 BLOOD GASES ANY COMBINATION: CPT

## 2023-04-12 PROCEDURE — 99465 NB RESUSCITATION: CPT

## 2023-04-12 PROCEDURE — 6360000002 HC RX W HCPCS: Performed by: OBSTETRICS & GYNECOLOGY

## 2023-04-12 PROCEDURE — 6360000002 HC RX W HCPCS

## 2023-04-12 PROCEDURE — 00HU33Z INSERTION OF INFUSION DEVICE INTO SPINAL CANAL, PERCUTANEOUS APPROACH: ICD-10-PCS | Performed by: ANESTHESIOLOGY

## 2023-04-12 PROCEDURE — 82962 GLUCOSE BLOOD TEST: CPT

## 2023-04-12 PROCEDURE — 7210000100 HC LABOR FEE PER 1 HR

## 2023-04-12 PROCEDURE — 2580000003 HC RX 258: Performed by: OBSTETRICS & GYNECOLOGY

## 2023-04-12 PROCEDURE — 36600 WITHDRAWAL OF ARTERIAL BLOOD: CPT

## 2023-04-12 PROCEDURE — 59400 OBSTETRICAL CARE: CPT | Performed by: OBSTETRICS & GYNECOLOGY

## 2023-04-12 PROCEDURE — 7100000010 HC PHASE II RECOVERY - FIRST 15 MIN

## 2023-04-12 PROCEDURE — 80048 BASIC METABOLIC PNL TOTAL CA: CPT

## 2023-04-12 PROCEDURE — 1100000000 HC RM PRIVATE

## 2023-04-12 PROCEDURE — 6370000000 HC RX 637 (ALT 250 FOR IP): Performed by: OBSTETRICS & GYNECOLOGY

## 2023-04-12 PROCEDURE — 6360000002 HC RX W HCPCS: Performed by: ANESTHESIOLOGY

## 2023-04-12 PROCEDURE — 7100000011 HC PHASE II RECOVERY - ADDTL 15 MIN

## 2023-04-12 PROCEDURE — 85027 COMPLETE CBC AUTOMATED: CPT

## 2023-04-12 PROCEDURE — 86900 BLOOD TYPING SEROLOGIC ABO: CPT

## 2023-04-12 PROCEDURE — 0KQM0ZZ REPAIR PERINEUM MUSCLE, OPEN APPROACH: ICD-10-PCS | Performed by: OBSTETRICS & GYNECOLOGY

## 2023-04-12 PROCEDURE — 10D17Z9 MANUAL EXTRACTION OF PRODUCTS OF CONCEPTION, RETAINED, VIA NATURAL OR ARTIFICIAL OPENING: ICD-10-PCS | Performed by: OBSTETRICS & GYNECOLOGY

## 2023-04-12 PROCEDURE — 7220000101 HC DELIVERY VAGINAL/SINGLE

## 2023-04-12 PROCEDURE — 51701 INSERT BLADDER CATHETER: CPT

## 2023-04-12 PROCEDURE — 36415 COLL VENOUS BLD VENIPUNCTURE: CPT

## 2023-04-12 RX ORDER — ONDANSETRON 8 MG/1
8 TABLET, ORALLY DISINTEGRATING ORAL EVERY 8 HOURS PRN
Status: DISCONTINUED | OUTPATIENT
Start: 2023-04-12 | End: 2023-04-14 | Stop reason: HOSPADM

## 2023-04-12 RX ORDER — SODIUM CHLORIDE, SODIUM LACTATE, POTASSIUM CHLORIDE, AND CALCIUM CHLORIDE .6; .31; .03; .02 G/100ML; G/100ML; G/100ML; G/100ML
1000 INJECTION, SOLUTION INTRAVENOUS PRN
Status: DISCONTINUED | OUTPATIENT
Start: 2023-04-12 | End: 2023-04-12

## 2023-04-12 RX ORDER — SODIUM CHLORIDE 0.9 % (FLUSH) 0.9 %
5-40 SYRINGE (ML) INJECTION PRN
Status: DISCONTINUED | OUTPATIENT
Start: 2023-04-12 | End: 2023-04-12

## 2023-04-12 RX ORDER — IBUPROFEN 800 MG/1
800 TABLET ORAL EVERY 8 HOURS
Status: DISCONTINUED | OUTPATIENT
Start: 2023-04-12 | End: 2023-04-14 | Stop reason: HOSPADM

## 2023-04-12 RX ORDER — ONDANSETRON 2 MG/ML
4 INJECTION INTRAMUSCULAR; INTRAVENOUS EVERY 6 HOURS PRN
Status: DISCONTINUED | OUTPATIENT
Start: 2023-04-12 | End: 2023-04-12

## 2023-04-12 RX ORDER — SODIUM CHLORIDE 0.9 % (FLUSH) 0.9 %
5-40 SYRINGE (ML) INJECTION EVERY 12 HOURS SCHEDULED
Status: DISCONTINUED | OUTPATIENT
Start: 2023-04-12 | End: 2023-04-12

## 2023-04-12 RX ORDER — ACETAMINOPHEN 500 MG
1000 TABLET ORAL EVERY 8 HOURS PRN
Status: DISCONTINUED | OUTPATIENT
Start: 2023-04-12 | End: 2023-04-14 | Stop reason: HOSPADM

## 2023-04-12 RX ORDER — TERBUTALINE SULFATE 1 MG/ML
0.25 INJECTION, SOLUTION SUBCUTANEOUS
Status: ACTIVE | OUTPATIENT
Start: 2023-04-12 | End: 2023-04-13

## 2023-04-12 RX ORDER — TRANEXAMIC ACID 10 MG/ML
1000 INJECTION, SOLUTION INTRAVENOUS
Status: ACTIVE | OUTPATIENT
Start: 2023-04-12 | End: 2023-04-13

## 2023-04-12 RX ORDER — METHYLERGONOVINE MALEATE 0.2 MG/ML
200 INJECTION INTRAVENOUS PRN
Status: DISCONTINUED | OUTPATIENT
Start: 2023-04-12 | End: 2023-04-14 | Stop reason: HOSPADM

## 2023-04-12 RX ORDER — MISOPROSTOL 200 UG/1
800 TABLET ORAL PRN
Status: DISCONTINUED | OUTPATIENT
Start: 2023-04-12 | End: 2023-04-12

## 2023-04-12 RX ORDER — LANOLIN
CREAM (ML) TOPICAL PRN
Status: DISCONTINUED | OUTPATIENT
Start: 2023-04-12 | End: 2023-04-14 | Stop reason: HOSPADM

## 2023-04-12 RX ORDER — DEXTROSE, SODIUM CHLORIDE, SODIUM LACTATE, POTASSIUM CHLORIDE, AND CALCIUM CHLORIDE 5; .6; .31; .03; .02 G/100ML; G/100ML; G/100ML; G/100ML; G/100ML
INJECTION, SOLUTION INTRAVENOUS CONTINUOUS
Status: DISCONTINUED | OUTPATIENT
Start: 2023-04-12 | End: 2023-04-12

## 2023-04-12 RX ORDER — NALOXONE HYDROCHLORIDE 0.4 MG/ML
INJECTION, SOLUTION INTRAMUSCULAR; INTRAVENOUS; SUBCUTANEOUS PRN
Status: DISCONTINUED | OUTPATIENT
Start: 2023-04-12 | End: 2023-04-12

## 2023-04-12 RX ORDER — SODIUM CHLORIDE, SODIUM LACTATE, POTASSIUM CHLORIDE, CALCIUM CHLORIDE 600; 310; 30; 20 MG/100ML; MG/100ML; MG/100ML; MG/100ML
INJECTION, SOLUTION INTRAVENOUS CONTINUOUS
Status: DISCONTINUED | OUTPATIENT
Start: 2023-04-12 | End: 2023-04-14 | Stop reason: HOSPADM

## 2023-04-12 RX ORDER — DOCUSATE SODIUM 100 MG/1
100 CAPSULE, LIQUID FILLED ORAL 2 TIMES DAILY
Status: DISCONTINUED | OUTPATIENT
Start: 2023-04-12 | End: 2023-04-14 | Stop reason: HOSPADM

## 2023-04-12 RX ORDER — METHYLERGONOVINE MALEATE 0.2 MG/ML
200 INJECTION INTRAVENOUS PRN
Status: DISCONTINUED | OUTPATIENT
Start: 2023-04-12 | End: 2023-04-12

## 2023-04-12 RX ORDER — MISOPROSTOL 200 UG/1
200 TABLET ORAL PRN
Status: DISCONTINUED | OUTPATIENT
Start: 2023-04-12 | End: 2023-04-14 | Stop reason: HOSPADM

## 2023-04-12 RX ORDER — SODIUM CHLORIDE 9 MG/ML
25 INJECTION, SOLUTION INTRAVENOUS PRN
Status: DISCONTINUED | OUTPATIENT
Start: 2023-04-12 | End: 2023-04-12

## 2023-04-12 RX ORDER — SODIUM CHLORIDE, SODIUM LACTATE, POTASSIUM CHLORIDE, AND CALCIUM CHLORIDE .6; .31; .03; .02 G/100ML; G/100ML; G/100ML; G/100ML
500 INJECTION, SOLUTION INTRAVENOUS PRN
Status: DISCONTINUED | OUTPATIENT
Start: 2023-04-12 | End: 2023-04-12

## 2023-04-12 RX ORDER — OXYCODONE HYDROCHLORIDE 5 MG/1
5 TABLET ORAL EVERY 4 HOURS PRN
Status: DISCONTINUED | OUTPATIENT
Start: 2023-04-12 | End: 2023-04-14 | Stop reason: HOSPADM

## 2023-04-12 RX ORDER — NALBUPHINE HCL 10 MG/ML
10 AMPUL (ML) INJECTION
Status: DISCONTINUED | OUTPATIENT
Start: 2023-04-12 | End: 2023-04-12

## 2023-04-12 RX ORDER — DOCUSATE SODIUM 100 MG/1
100 CAPSULE, LIQUID FILLED ORAL 2 TIMES DAILY
Status: DISCONTINUED | OUTPATIENT
Start: 2023-04-12 | End: 2023-04-12

## 2023-04-12 RX ORDER — CARBOPROST TROMETHAMINE 250 UG/ML
250 INJECTION, SOLUTION INTRAMUSCULAR PRN
Status: DISCONTINUED | OUTPATIENT
Start: 2023-04-12 | End: 2023-04-14 | Stop reason: HOSPADM

## 2023-04-12 RX ADMIN — DOCUSATE SODIUM 100 MG: 100 CAPSULE ORAL at 20:20

## 2023-04-12 RX ADMIN — Medication 87.3 MILLI-UNITS/MIN: at 09:17

## 2023-04-12 RX ADMIN — IBUPROFEN 800 MG: 800 TABLET, FILM COATED ORAL at 23:23

## 2023-04-12 RX ADMIN — ONDANSETRON 4 MG: 2 INJECTION INTRAMUSCULAR; INTRAVENOUS at 08:50

## 2023-04-12 RX ADMIN — VANCOMYCIN HYDROCHLORIDE 2000 MG: 10 INJECTION, POWDER, LYOPHILIZED, FOR SOLUTION INTRAVENOUS at 05:20

## 2023-04-12 RX ADMIN — Medication: at 15:04

## 2023-04-12 RX ADMIN — WITCH HAZEL: 500 SOLUTION RECTAL; TOPICAL at 15:04

## 2023-04-12 RX ADMIN — SODIUM CHLORIDE, SODIUM LACTATE, POTASSIUM CHLORIDE, CALCIUM CHLORIDE AND DEXTROSE MONOHYDRATE: 5; 600; 310; 30; 20 INJECTION, SOLUTION INTRAVENOUS at 05:18

## 2023-04-12 RX ADMIN — IBUPROFEN 800 MG: 800 TABLET, FILM COATED ORAL at 15:00

## 2023-04-12 RX ADMIN — Medication 166.7 ML: at 09:05

## 2023-04-12 RX ADMIN — ACETAMINOPHEN 1000 MG: 500 TABLET, FILM COATED ORAL at 20:20

## 2023-04-12 NOTE — L&D DELIVERY NOTE
Counts Correct Correct Correct   Final Counts      Final Count Personnel: James Aguayo  Final Count Verified By: Elma Fletcher  Accurate Final Count?: Yes  If the count is incorrect due to Intentionally Retained Foreign Object (IRFO) add the IRFO LDA in Lines/Drains. Add LDA: Link to [facility]       Blood Loss  Mother: Bryan Palmer #[id number]     Start of Mother's Information      Delivery Blood Loss  23 0400 - 23 0946      Quantitative Blood Loss (mL) Hospital Encounter 150 grams    Total  150 mL               End of Mother's Information  Mother: Bryan Palmer #[id number]                Delivery Providers    Delivering clinician: Adriana Guerrero DO     Provider Role    Adriana Guerrero DO Obstetrician    Shannon Delarosa, CINTIA Primary Nurse    Evert Andrea RN Primary  Nurse    Cory Ocampo MD Neonatologist    41 Christian Glass RN Charge Nurse    March Koyanagi, RCP Respiratory Therapist (Day)               Assessment    Living Status: Living     Apgar Scoring Key:    0 1 2    Skin Color: Blue or pale Acrocyanotic Completely pink    Heart Rate: Absent <100 bpm >100 bpm    Reflex Irritability: No response Grimace Cry or active withdrawal    Muscle Tone: Limp Some flexion Active motion    Respiratory Effort: Absent Weak cry; hypoventilation Good, crying                      Skin Color:   Heart Rate:   Reflex Irritability:   Muscle Tone:   Respiratory Effort:    Total:            1 Minute:    0    2    2    1    2    7        Apgar 1 total from OB History    5 Minute:    1    2    2    2    2    9        Apgar 5 total from OB History    10 Minute:              15 Minute:              20 Minute:                        Apgars Assigned By: DR Jenny Vogel              Resuscitation    Method: Bulb Suction, Stimulation             Sterling Measurements      Birth Weight: 3430 g               Title      Skin to Skin Initiation Date/Time:       Skin to Skin End Date/Time:

## 2023-04-12 NOTE — PROGRESS NOTES
Patient up to bathroom with minimal assistance. Kimberly-care taught and completed. Questions encouraged and answered. Patient back to bed, encouraged to call for needs or concerns. Verbalizes understanding.

## 2023-04-12 NOTE — PROGRESS NOTES
Kiah Perea at bedside at [phone]. IRMA SOTOat bedside at 1815    Assisted pt to sitting up on bedside at 0541. Timeout completed at 0542 with MD, IRMA and myself at bedside. Test dose given at 0549. Negative reaction. Dose given at . Pt assisted to lying back in left tilt position. See anesthesia record for details. See vital sign flow sheet for BP. Tolerated procedure well.

## 2023-04-12 NOTE — PROGRESS NOTES
Pt to 426. Pt scheduled for induction today but presents reporting regular, painful contractions. EFM applied. ADELINA MCCAULEY  [facility], RN 4/90

## 2023-04-12 NOTE — PROGRESS NOTES
SBAR report received from KENNY Gusman RN. Care assumed. 56- Dr Tereza Valle at bedside. Strip reviewed. SVE 6-7/100/-1.

## 2023-04-12 NOTE — PROGRESS NOTES
I&O cath for 100 cc urine. SVE c/c/0. Dr Sonali Voss notified. 0094- Pt starting to push. 0845- Pt set up for delivery. Dr Sonali Voss at bedside. 0226- . Viable female. SHANTA and RESP at delivery for thick mec. 1040- I&O cath for 200 cc urine. Pt unable to walk. Pad changed and david care done. Pt to NICU to see infant. 1055- SBAR report given to FRANCO Zavala RN. Pt remains in NICU.

## 2023-04-12 NOTE — PROGRESS NOTES
VANCO DAILY FOLLOW UP NOTE  [address] [facility] Pharmacokinetic Monitoring Service - Vancomycin    Consulting Provider: Alt   Indication: GBP Prophylaxis  Target Concentration: Goal trough of 10-15 mg/L and AUC/JW <500 mg*hr/L  Day of Therapy: 1  Additional Antimicrobials: None    Patient eligible for piperacillin-tazobactam to cefepime auto-substitution per P&T approved protocol? NO    Pertinent Laboratory Values: Wt Readings from Last 1 Encounters:   04/06/23 220 lb (99.8 kg)     Temp Readings from Last 1 Encounters:   04/12/23 98.2 °F (36.8 °C) (Oral)     Recent Labs     04/12/23  0455   WBC 13.8*     CrCl cannot be calculated (Patient's most recent lab result is older than the maximum 30 days allowed. ). No results found for: Robert Medina    MRSA Nasal Swab: N/A.  Non-respiratory infection      Plan:  Dosing recommendations based on Bayesian software  Start vancomycin 1000mg IV q12h per lexicomp dosing for GBS Prophylaxis  Renal labs as indicated   Vancomycin concentrations will be ordered as clinically appropriate   Pharmacy will continue to monitor patient and adjust therapy as indicated    Thank you for the consult,    John Street, PharmD, BCPS  4/12/2023

## 2023-04-12 NOTE — LACTATION NOTE
Lactation visit. Mom asleep. Spoke with Dad briefly. RN started mom pumping 1 hour ago, baby in NCU for respiratory issues. Mom pumped drops at first pump session, mom is experienced with breastfeeding other 2 children per Dad. Reviewed need to pump every 3 hours while baby in NCU. Offered help with pumping once mom awake as needed. LC to continue to follow.

## 2023-04-13 PROCEDURE — 6370000000 HC RX 637 (ALT 250 FOR IP): Performed by: OBSTETRICS & GYNECOLOGY

## 2023-04-13 PROCEDURE — 1100000000 HC RM PRIVATE

## 2023-04-13 RX ADMIN — ACETAMINOPHEN 1000 MG: 500 TABLET, FILM COATED ORAL at 03:55

## 2023-04-13 RX ADMIN — DOCUSATE SODIUM 100 MG: 100 CAPSULE ORAL at 19:35

## 2023-04-13 RX ADMIN — IBUPROFEN 800 MG: 800 TABLET, FILM COATED ORAL at 08:34

## 2023-04-13 RX ADMIN — IBUPROFEN 800 MG: 800 TABLET, FILM COATED ORAL at 19:34

## 2023-04-13 RX ADMIN — DOCUSATE SODIUM 100 MG: 100 CAPSULE ORAL at 08:34

## 2023-04-13 RX ADMIN — SERTRALINE 100 MG: 50 TABLET, FILM COATED ORAL at 08:34

## 2023-04-13 ASSESSMENT — PAIN DESCRIPTION - DESCRIPTORS: DESCRIPTORS: CRAMPING;SORE

## 2023-04-13 ASSESSMENT — PAIN DESCRIPTION - LOCATION: LOCATION: ABDOMEN;BACK

## 2023-04-13 ASSESSMENT — PAIN SCALES - GENERAL: PAINLEVEL_OUTOF10: 2

## 2023-04-13 ASSESSMENT — PAIN DESCRIPTION - ORIENTATION: ORIENTATION: ANTERIOR;LOWER

## 2023-04-13 NOTE — LACTATION NOTE
This note was copied from a baby's chart. In to see mom and infant for the first time. Experienced mom stated that infant has nursed and has had formula. She has also pumped a couple of times. Infant was asleep in the crib and mom and dad stated that infant had not eaten since 0400. Reviewed that she dis need to eat since infant is over 25 hours old. Mom stated that she used a nippleshield with both her other children and she is using one with this infant. She placed the shield on her right breast and attempted to latch infant. Informed mom that the shield appeared too big and she stated that the 20 mm was too snug and was uncomfortable. Suggested she try a 20 mm anyway so as to make it easier for infant. After several attempts and infant not latching she agreed. Mom placed the 20 mm shield on and infant would latch and take 2-3 sucks and come off. After last attempt did note a few drops in the shield. Infant fussy and then sleepy at the breast. Dad suggested mom pump and she stated she did not want to now. Also suggested formula and she stated she wanted wait on that also. Did inform mom that infant needed to eat. She stated that she knew that infant will cluster feed tonight.  entered the room and mom decided she wanted to not try to offer breast anymore at this time. Informed bedside nurse of above. Lactation consultant to follow up as needed.

## 2023-04-13 NOTE — CARE COORDINATION
SW is familiar with patient due to ongoing telephonic case management during pregnancy (please see notes). Patient with a history of postpartum depression. SW met with patient and  to complete assessment. Patient is currently taking Zoloft (recently increased to 100mg). Both patient/ report that this medication has been helpful. In addition to receiving medication management through [facility]'s [facility]ACTT [facility], patient is also receiving virtual therapy visits. Discussed several preventative behaviors to implement in order to reduce likelihood of postpartum depression/anxiety. SW encouraged patient to get adequate sleep, practice self-care, utilize her support system, and maintain sufficient nourishment. Patient's  Judith Dotson) has 10 weeks off of work and is a significant source of strength/support. Family denies any additional needs from  at this time. Patient is receptive to  calling within the next few weeks to check-in. Family has 's contact information should any needs/questions arise.     ELENITA Hester, [address] [facility]   [phone]

## 2023-04-13 NOTE — LACTATION NOTE

## 2023-04-13 NOTE — PROGRESS NOTES
Post-Partum Day Number 1 Progress Note    Patient doing well  without significant complaints. Pain controlled on current medication. Voiding without difficulty, normal lochia. Pt came out of nicu this am.      Vitals:  /77   Pulse 81   Temp 97.9 °F (36.6 °C) (Oral)   Resp 18   LMP 07/01/2022   SpO2 97%   Breastfeeding Unknown     Vital signs stable, afebrile. Exam:  Patient without distress. Heart rrr  Lungs cta b&s               Abdomen soft, fundus firm at level of umbilicus, nontender. Lower extremities are negative for swelling, cords or tenderness. No results found for: [facility]     Lab Results   Component Value Date/Time    ABORH O POSITIVE 04/12/2023 04:55 AM     Lab Results   Component Value Date/Time    HGB 11.2 04/12/2023 04:55 AM         Assessment and Plan:  Patient appears to be having uncomplicated post-partum course. Baby did go to nicu for resp transition. Continue routine post-delivery care and maternal education. Breastfeeding. Gest dm with normal sugars. Anticipate discharge home tomorrow.

## 2023-04-14 VITALS
OXYGEN SATURATION: 98 % | TEMPERATURE: 98 F | RESPIRATION RATE: 17 BRPM | SYSTOLIC BLOOD PRESSURE: 104 MMHG | HEART RATE: 68 BPM | DIASTOLIC BLOOD PRESSURE: 75 MMHG

## 2023-04-14 PROCEDURE — 6370000000 HC RX 637 (ALT 250 FOR IP): Performed by: OBSTETRICS & GYNECOLOGY

## 2023-04-14 RX ORDER — IBUPROFEN 800 MG/1
800 TABLET ORAL EVERY 8 HOURS
Qty: 40 TABLET | Refills: 0 | Status: SHIPPED | OUTPATIENT
Start: 2023-04-14

## 2023-04-14 RX ADMIN — IBUPROFEN 800 MG: 800 TABLET, FILM COATED ORAL at 08:07

## 2023-04-14 RX ADMIN — SERTRALINE 100 MG: 50 TABLET, FILM COATED ORAL at 08:08

## 2023-04-14 RX ADMIN — DOCUSATE SODIUM 100 MG: 100 CAPSULE ORAL at 08:07

## 2023-04-14 NOTE — LACTATION NOTE
This note was copied from a baby's chart. In to see mom and infant prior to discharge to home. Mom stated that she has continued to attempt to latch infant using the 20 mm flange. Said that infant latches and nurses and then she offers the formula supplement. . reviewed discharge information and answered questions. Mom and infant are following up with [facility] and will see lactation consultant there.

## 2023-04-14 NOTE — DISCHARGE SUMMARY
Obstetrical Discharge Summary     Name: Kathrine Lim MRN: [id number]  SSN: [id number]    YOB: 1990  Age: 34 y.o. Sex: female      Allergies: Penicillins and Sulfa antibiotics    Admit Date: 2023    Discharge Date: 2023     Admitting Physician: Chano Eugene DO     Attending Physician:  Chano Eugene DO     * Admission Diagnoses: 40 weeks gestation of pregnancy [Z3A.40]    * Discharge Diagnoses:   Information for the patient's :  Selma Mcgovern [[id number]]   @[id number]@      Additional Diagnoses:    Lab Results   Component Value Date/Time    82 Digna Meza O POSITIVE 2023 04:55 AM      Immunization History   Administered Date(s) Administered    Influenza, FLUCELVAX, (age 10 mo+), MDCK, PF, 0.5mL 2022    TDaP, ADACEL (age 10y-63y), BOOSTRIX (age 10y+), IM, 0.5mL 2023       * Procedures: vaginal delivery  * No surgery found *           * Discharge Condition: Good    [facility] Course: Normal hospital course following the delivery. * Disposition: Home    Discharge Medications:      Medication List        START taking these medications      ibuprofen 800 MG tablet  Commonly known as: ADVIL;MOTRIN  Take 1 tablet by mouth in the morning and 1 tablet at noon and 1 tablet in the evening.             CONTINUE taking these medications      blood glucose monitor kit and supplies  Please supply patient with a glucose monitor that is covered by insurance that can be used to check blood sugar readings 4 times daily     docusate sodium 100 MG capsule  Commonly known as: COLACE     Lancets Misc  1 each by Does not apply route daily Use to check blood sugar readings 4 times daily     Magnesium Citrate 100 MG Caps     PRENATAL + DHA PO     sertraline 50 MG tablet  Commonly known as: Zoloft  Take 2 tablets by mouth daily     vitamin D 50 MCG (2000) Caps capsule            STOP taking these medications      blood glucose test strips               Where to Get Your Quality 226: Preventive Care And Screening: Tobacco Use: Screening And Cessation Intervention: Patient screened for tobacco use and is an ex/non-smoker

## 2023-04-14 NOTE — LACTATION NOTE

## 2023-04-14 NOTE — PROGRESS NOTES
Post-Partum Day Number 2 Progress/Discharge Note    Patient doing well post-partum without significant complaint. Voiding without difficulty, normal lochia, positive flatus. Vitals:  Patient Vitals for the past 8 hrs:   BP Temp Temp src Pulse Resp SpO2   23 0800 104/75 98 °F (36.7 °C) Oral 68 17 98 %     Temp (24hrs), Av °F (36.7 °C), Min:97.9 °F (36.6 °C), Max:98 °F (36.7 °C)      Vital signs stable, afebrile. Exam:  Patient without distress. Abdomen soft, fundus firm at level of umbilicus, non tender               Lower extremities are negative for swelling, cords or tenderness. Lab/Data Review:      Assessment and Plan:  Patient appears to be having uncomplicated post-partum course. Continue routine perineal care and maternal education. Plan discharge for today with follow up in our office in 2 weeks.

## 2023-04-14 NOTE — PLAN OF CARE
Problem: Postpartum  Goal: Experiences normal postpartum course  Description:  Postpartum OB-Pregnancy care plan goal which identifies if the mother is experiencing a normal postpartum course  Outcome: Progressing  Goal: Appropriate maternal -  bonding  Description:  Postpartum OB-Pregnancy care plan goal which identifies if the mother and  are bonding appropriately  Outcome: Progressing  Goal: Establishment of infant feeding pattern  Description:  Postpartum OB-Pregnancy care plan goal which identifies if the mother is establishing a feeding pattern with their   Outcome: Progressing     Problem: Pain  Goal: Verbalizes/displays adequate comfort level or baseline comfort level  Outcome: Progressing

## 2023-04-18 PROBLEM — O24.410 DIET CONTROLLED GESTATIONAL DIABETES MELLITUS (GDM) IN THIRD TRIMESTER: Status: RESOLVED | Noted: 2023-01-27 | Resolved: 2023-04-18

## 2023-04-18 PROBLEM — O09.93 HIGH-RISK PREGNANCY IN THIRD TRIMESTER: Status: RESOLVED | Noted: 2022-09-08 | Resolved: 2023-04-18

## 2023-04-18 PROBLEM — O26.813 FATIGUE DURING PREGNANCY IN THIRD TRIMESTER: Status: RESOLVED | Noted: 2023-03-06 | Resolved: 2023-04-18

## 2023-04-24 ENCOUNTER — FOLLOWUP TELEPHONE ENCOUNTER (OUTPATIENT)
Dept: CASE MANAGEMENT | Age: 33
End: 2023-04-24

## 2023-04-24 NOTE — TELEPHONE ENCOUNTER
Phone call to patient at [phone]. SW spoke with patient's  Sera Lyons) who will have patient call me later as she's currently resting.     ELENITA Pantoja, [address] [facility]   [phone]

## 2023-04-27 ENCOUNTER — POSTPARTUM VISIT (OUTPATIENT)
Dept: OBGYN CLINIC | Age: 33
End: 2023-04-27

## 2023-04-27 VITALS
BODY MASS INDEX: 31.45 KG/M2 | WEIGHT: 200.4 LBS | SYSTOLIC BLOOD PRESSURE: 110 MMHG | DIASTOLIC BLOOD PRESSURE: 60 MMHG | HEIGHT: 67 IN

## 2023-04-27 PROCEDURE — 99902 PR PRENATAL VISIT: CPT | Performed by: OBSTETRICS & GYNECOLOGY

## 2023-04-27 NOTE — PROGRESS NOTES
2 Week Postpartum Visit   Delivered by Dr. Melvina Cormier on 2023 by IMMANUEL. with a 2nd degree periurethral laceration    Is using Oliv[employer]'s Nipple cream. - went to lactation due to nipple pain when breast feeding . Infant's Gender: female  Birth Weight: 7lb 9 oz Feeding: breast feeding    Desired Contraception: none - will wait till next time. Last Pap smear date: 2021  Last Pap smear result: Negative HPV negative      Lochia is appropriate. Moods are appropriate. Breasts are doing well. Infant is thriving. Wound is clean dry and intact with normal exam.  She is given contraceptive options as indicated. She will followup as indicated for 6 weeks check.

## 2023-05-08 ENCOUNTER — FOLLOWUP TELEPHONE ENCOUNTER (OUTPATIENT)
Dept: CASE MANAGEMENT | Age: 33
End: 2023-05-08

## 2023-05-08 NOTE — TELEPHONE ENCOUNTER
Phone call to patient at [phone]. No answer; message left requesting call back.       ELENITA Holley, [address] [facility]   [phone]

## 2023-05-25 ENCOUNTER — POSTPARTUM VISIT (OUTPATIENT)
Dept: OBGYN CLINIC | Age: 33
End: 2023-05-25

## 2023-05-25 VITALS
SYSTOLIC BLOOD PRESSURE: 124 MMHG | HEIGHT: 67 IN | WEIGHT: 205 LBS | BODY MASS INDEX: 32.18 KG/M2 | DIASTOLIC BLOOD PRESSURE: 72 MMHG

## 2023-05-25 NOTE — PROGRESS NOTES
6 Week Postpartum Visit    Name: Genia Raya    Date: 2023    Age: 34 y.o.    OB History          4    Para   3    Term   3            AB   1    Living   3         SAB   1    IAB        Ectopic        Molar        Multiple   0    Live Births   3              /72   Ht 5' 7\" (1.702 m)   Wt 205 lb (93 kg)        Delivered by Dr. Grady Judd on 23 by  w/ 2nd degree periurethral lac. Infant's Name: Teddy Bates Infant's Gender: Female  Birth Weight: 7lb 9oz Feeding: Breast feeding  Desired Contraception: considering partner vasectomy      Last pap: 21 - Nilm, HPV neg      Current Outpatient Medications   Medication Sig Dispense Refill    Probiotic Product (PROBIOTIC PO) Take by mouth      sertraline (ZOLOFT) 50 MG tablet Take 2 tablets by mouth daily 30 tablet 5    Cholecalciferol (VITAMIN D) 50 MCG ( UT) CAPS capsule Take by mouth       No current facility-administered medications for this visit.        Physical Exam  OBGyn Exam     Moods stable  Lochia appropriate  Laceration c/d/I healing well  Declines BC at this time  Breastfeeding going well with minimal discomfort    May resume all activity    RTO PRN    Howard Aguirre MD

## 2023-07-28 RX ORDER — SERTRALINE HYDROCHLORIDE 100 MG/1
100 TABLET, FILM COATED ORAL DAILY
Qty: 30 TABLET | Refills: 11 | Status: SHIPPED | OUTPATIENT
Start: 2023-07-28

## 2024-06-25 NOTE — ADDENDUM NOTE
Addended by: Rasheeda Matamoros on: 11/8/2022 02:34 PM     Modules accepted: Orders Detail Level: Detailed Depth Of Biopsy: dermis Was A Bandage Applied: Yes Size Of Lesion In Cm: 2 X Size Of Lesion In Cm: 0 Biopsy Type: H and E Biopsy Method: Personna blade Anesthesia Type: 1% lidocaine with epinephrine Anesthesia Volume In Cc: 0.5 Hemostasis: Aluminum Chloride Wound Care: Petrolatum Dressing: bandage Type Of Destruction Used: Electrodesiccation Curettage Text: The wound bed was treated with curettage after the biopsy was performed. Cryotherapy Text: The wound bed was treated with cryotherapy after the biopsy was performed. Electrodesiccation Text: The wound bed was treated with electrodesiccation after the biopsy was performed. Electrodesiccation And Curettage Text: The wound bed was treated with electrodesiccation and curettage after the biopsy was performed. Silver Nitrate Text: The wound bed was treated with silver nitrate after the biopsy was performed. Lab: [id number] Lab Facility: [id number] Notes In Note?: No Consent: Written consent was obtained and risks were reviewed including but not limited to scarring, infection, bleeding, scabbing, incomplete removal, nerve damage and allergy to anesthesia. Post-Care Instructions: I reviewed with the patient in detail post-care instructions. Patient is to keep the biopsy site dry overnight, and then apply petrolatum twice daily until healed. Patient may apply hydrogen peroxide or vinegar soaks to remove any crusting. Notification Instructions: Patient will be notified of biopsy results which will return in 7-10 days. However, patient instructed to call the office if not contacted within 2 weeks. Billing Type: Third-Party Bill Information: Selecting Yes will display possible errors in your note based on the variables you have selected. This validation is only offered as a suggestion for you. PLEASE NOTE THAT THE VALIDATION TEXT WILL BE REMOVED WHEN YOU FINALIZE YOUR NOTE. IF YOU WANT TO FAX A PRELIMINARY NOTE YOU WILL NEED TO TOGGLE THIS TO 'NO' IF YOU DO NOT WANT IT IN YOUR FAXED NOTE.

## 2024-09-11 ENCOUNTER — LAB (OUTPATIENT)
Dept: OBGYN CLINIC | Age: 34
End: 2024-09-11

## 2024-09-11 DIAGNOSIS — Z32.01 POSITIVE PREGNANCY TEST: ICD-10-CM

## 2024-09-11 DIAGNOSIS — Z87.59 HISTORY OF MISCARRIAGE: Primary | ICD-10-CM

## 2024-09-11 DIAGNOSIS — Z87.59 HISTORY OF MISCARRIAGE: ICD-10-CM

## 2024-09-11 LAB
HCG SERPL-ACNC: 102 MIU/ML
PROGEST SERPL-MCNC: 3.93 NG/ML

## 2024-09-12 DIAGNOSIS — Z87.59 HISTORY OF MISCARRIAGE: Primary | ICD-10-CM

## 2024-09-12 RX ORDER — PROGESTERONE 200 MG/1
200 CAPSULE ORAL NIGHTLY
Qty: 30 CAPSULE | Refills: 2 | Status: SHIPPED | OUTPATIENT
Start: 2024-09-12

## 2024-09-13 DIAGNOSIS — Z87.59 HISTORY OF MISCARRIAGE: ICD-10-CM

## 2024-09-13 LAB — HCG SERPL-ACNC: 165 MIU/ML

## 2024-09-16 ENCOUNTER — TELEPHONE (OUTPATIENT)
Dept: OBGYN CLINIC | Age: 34
End: 2024-09-16

## 2024-09-25 ENCOUNTER — APPOINTMENT (OUTPATIENT)
Dept: GENERAL RADIOLOGY | Age: 34
End: 2024-09-25
Payer: COMMERCIAL

## 2024-09-25 ENCOUNTER — HOSPITAL ENCOUNTER (EMERGENCY)
Age: 34
Discharge: HOME | End: 2024-09-25
Attending: STUDENT IN AN ORGANIZED HEALTH CARE EDUCATION/TRAINING PROGRAM
Payer: COMMERCIAL

## 2024-09-25 VITALS
HEART RATE: 98 BPM | WEIGHT: 200.6 LBS | BODY MASS INDEX: 30.4 KG/M2 | TEMPERATURE: 99.7 F | RESPIRATION RATE: 16 BRPM | HEIGHT: 68 IN | OXYGEN SATURATION: 97 % | SYSTOLIC BLOOD PRESSURE: 115 MMHG | DIASTOLIC BLOOD PRESSURE: 74 MMHG

## 2024-09-25 DIAGNOSIS — R50.9 FEVER, UNSPECIFIED FEVER CAUSE: Primary | ICD-10-CM

## 2024-09-25 LAB
ALBUMIN SERPL-MCNC: 3.4 G/DL (ref 3.5–5)
ALBUMIN/GLOB SERPL: 0.9 (ref 1–1.9)
ALP SERPL-CCNC: 100 U/L (ref 35–104)
ALT SERPL-CCNC: 42 U/L (ref 8–45)
ANION GAP SERPL CALC-SCNC: 16 MMOL/L (ref 9–18)
APPEARANCE UR: CLEAR
AST SERPL-CCNC: 40 U/L (ref 15–37)
BACTERIA URNS QL MICRO: NEGATIVE /HPF
BASOPHILS # BLD: 0 K/UL (ref 0–0.2)
BASOPHILS NFR BLD: 0 % (ref 0–2)
BILIRUB SERPL-MCNC: 0.5 MG/DL (ref 0–1.2)
BILIRUB UR QL: NEGATIVE
BUN SERPL-MCNC: 5 MG/DL (ref 6–23)
CALCIUM SERPL-MCNC: 8.6 MG/DL (ref 8.8–10.2)
CHLORIDE SERPL-SCNC: 98 MMOL/L (ref 98–107)
CO2 SERPL-SCNC: 23 MMOL/L (ref 20–28)
COLOR UR: ABNORMAL
CREAT SERPL-MCNC: 0.69 MG/DL (ref 0.6–1.1)
DIFFERENTIAL METHOD BLD: ABNORMAL
EOSINOPHIL # BLD: 0 K/UL (ref 0–0.8)
EOSINOPHIL NFR BLD: 0 % (ref 0.5–7.8)
EPI CELLS #/AREA URNS HPF: ABNORMAL /HPF
ERYTHROCYTE [DISTWIDTH] IN BLOOD BY AUTOMATED COUNT: 13.6 % (ref 11.9–14.6)
GLOBULIN SER CALC-MCNC: 3.6 G/DL (ref 2.3–3.5)
GLUCOSE SERPL-MCNC: 127 MG/DL (ref 70–99)
GLUCOSE UR STRIP.AUTO-MCNC: NEGATIVE MG/DL
HCG UR QL: POSITIVE
HCT VFR BLD AUTO: 37.1 % (ref 35.8–46.3)
HGB BLD-MCNC: 12.5 G/DL (ref 11.7–15.4)
HGB UR QL STRIP: NEGATIVE
HYALINE CASTS URNS QL MICRO: ABNORMAL /LPF
IMM GRANULOCYTES # BLD AUTO: 0 K/UL (ref 0–0.5)
IMM GRANULOCYTES NFR BLD AUTO: 1 % (ref 0–5)
KETONES UR QL STRIP.AUTO: ABNORMAL MG/DL
LEUKOCYTE ESTERASE UR QL STRIP.AUTO: NEGATIVE
LIPASE SERPL-CCNC: 21 U/L (ref 13–60)
LYMPHOCYTES # BLD: 1.2 K/UL (ref 0.5–4.6)
LYMPHOCYTES NFR BLD: 21 % (ref 13–44)
MCH RBC QN AUTO: 27.7 PG (ref 26.1–32.9)
MCHC RBC AUTO-ENTMCNC: 33.7 G/DL (ref 31.4–35)
MCV RBC AUTO: 82.1 FL (ref 82–102)
MONOCYTES # BLD: 0.5 K/UL (ref 0.1–1.3)
MONOCYTES NFR BLD: 8 % (ref 4–12)
NEUTS SEG # BLD: 4.1 K/UL (ref 1.7–8.2)
NEUTS SEG NFR BLD: 70 % (ref 43–78)
NITRITE UR QL STRIP.AUTO: NEGATIVE
NRBC # BLD: 0 K/UL (ref 0–0.2)
PH UR STRIP: 6.5 (ref 5–9)
PLATELET # BLD AUTO: 234 K/UL (ref 150–450)
PMV BLD AUTO: 10.3 FL (ref 9.4–12.3)
POTASSIUM SERPL-SCNC: 3.8 MMOL/L (ref 3.5–5.1)
PROT SERPL-MCNC: 7 G/DL (ref 6.3–8.2)
PROT UR STRIP-MCNC: ABNORMAL MG/DL
RBC # BLD AUTO: 4.52 M/UL (ref 4.05–5.2)
RBC #/AREA URNS HPF: ABNORMAL /HPF
SARS-COV-2 RDRP RESP QL NAA+PROBE: NOT DETECTED
SODIUM SERPL-SCNC: 137 MMOL/L (ref 136–145)
SOURCE: NORMAL
SP GR UR REFRACTOMETRY: 1.01 (ref 1–1.02)
UROBILINOGEN UR QL STRIP.AUTO: 0.2 EU/DL (ref 0.2–1)
WBC # BLD AUTO: 5.8 K/UL (ref 4.3–11.1)
WBC URNS QL MICRO: ABNORMAL /HPF

## 2024-09-25 PROCEDURE — 87635 SARS-COV-2 COVID-19 AMP PRB: CPT

## 2024-09-25 PROCEDURE — 6370000000 HC RX 637 (ALT 250 FOR IP): Performed by: STUDENT IN AN ORGANIZED HEALTH CARE EDUCATION/TRAINING PROGRAM

## 2024-09-25 PROCEDURE — 85025 COMPLETE CBC W/AUTO DIFF WBC: CPT

## 2024-09-25 PROCEDURE — 71046 X-RAY EXAM CHEST 2 VIEWS: CPT

## 2024-09-25 PROCEDURE — 2580000003 HC RX 258: Performed by: STUDENT IN AN ORGANIZED HEALTH CARE EDUCATION/TRAINING PROGRAM

## 2024-09-25 PROCEDURE — 81025 URINE PREGNANCY TEST: CPT

## 2024-09-25 PROCEDURE — 83690 ASSAY OF LIPASE: CPT

## 2024-09-25 PROCEDURE — 99284 EMERGENCY DEPT VISIT MOD MDM: CPT

## 2024-09-25 PROCEDURE — 80053 COMPREHEN METABOLIC PANEL: CPT

## 2024-09-25 PROCEDURE — 81001 URINALYSIS AUTO W/SCOPE: CPT

## 2024-09-25 RX ORDER — ACETAMINOPHEN 500 MG
1000 TABLET ORAL ONCE
Status: COMPLETED | OUTPATIENT
Start: 2024-09-25 | End: 2024-09-25

## 2024-09-25 RX ORDER — SODIUM CHLORIDE, SODIUM LACTATE, POTASSIUM CHLORIDE, AND CALCIUM CHLORIDE .6; .31; .03; .02 G/100ML; G/100ML; G/100ML; G/100ML
1000 INJECTION, SOLUTION INTRAVENOUS ONCE
Status: COMPLETED | OUTPATIENT
Start: 2024-09-25 | End: 2024-09-25

## 2024-09-25 RX ADMIN — SODIUM CHLORIDE, POTASSIUM CHLORIDE, SODIUM LACTATE AND CALCIUM CHLORIDE 1000 ML: 600; 310; 30; 20 INJECTION, SOLUTION INTRAVENOUS at 01:50

## 2024-09-25 RX ADMIN — ACETAMINOPHEN 1000 MG: 500 TABLET, FILM COATED ORAL at 06:31

## 2024-09-25 ASSESSMENT — ENCOUNTER SYMPTOMS
EYE PAIN: 0
ABDOMINAL PAIN: 0
NAUSEA: 0
WHEEZING: 0
VOMITING: 0
SORE THROAT: 0
SHORTNESS OF BREATH: 0
EYE DISCHARGE: 0

## 2024-09-25 ASSESSMENT — PAIN - FUNCTIONAL ASSESSMENT: PAIN_FUNCTIONAL_ASSESSMENT: NONE - DENIES PAIN

## 2024-10-01 ENCOUNTER — PROCEDURE VISIT (OUTPATIENT)
Dept: OBGYN CLINIC | Age: 34
End: 2024-10-01
Payer: COMMERCIAL

## 2024-10-01 ENCOUNTER — OFFICE VISIT (OUTPATIENT)
Dept: OBGYN CLINIC | Age: 34
End: 2024-10-01
Payer: COMMERCIAL

## 2024-10-01 VITALS
TEMPERATURE: 98.1 F | BODY MASS INDEX: 30.51 KG/M2 | SYSTOLIC BLOOD PRESSURE: 102 MMHG | DIASTOLIC BLOOD PRESSURE: 52 MMHG | HEIGHT: 68 IN | WEIGHT: 201.3 LBS

## 2024-10-01 DIAGNOSIS — O03.9 SAB (SPONTANEOUS ABORTION): ICD-10-CM

## 2024-10-01 DIAGNOSIS — Z13.89 SCREENING FOR GENITOURINARY CONDITION: ICD-10-CM

## 2024-10-01 DIAGNOSIS — R50.9 FEVER, UNSPECIFIED FEVER CAUSE: Primary | ICD-10-CM

## 2024-10-01 DIAGNOSIS — J06.9 UPPER RESPIRATORY TRACT INFECTION, UNSPECIFIED TYPE: ICD-10-CM

## 2024-10-01 DIAGNOSIS — O20.0 THREATENED ABORTION: Primary | ICD-10-CM

## 2024-10-01 DIAGNOSIS — R53.81 MALAISE: ICD-10-CM

## 2024-10-01 PROCEDURE — 76817 TRANSVAGINAL US OBSTETRIC: CPT | Performed by: OBSTETRICS & GYNECOLOGY

## 2024-10-01 PROCEDURE — 99214 OFFICE O/P EST MOD 30 MIN: CPT | Performed by: NURSE PRACTITIONER

## 2024-10-01 RX ORDER — ACETAMINOPHEN 325 MG/1
650 TABLET ORAL EVERY 6 HOURS PRN
Status: ON HOLD | COMMUNITY

## 2024-10-01 RX ORDER — CLINDAMYCIN HCL 300 MG
300 CAPSULE ORAL 4 TIMES DAILY
Qty: 40 CAPSULE | Refills: 0 | Status: ON HOLD | OUTPATIENT
Start: 2024-10-01 | End: 2024-10-11

## 2024-10-01 RX ORDER — IBUPROFEN 200 MG
200 TABLET ORAL EVERY 6 HOURS PRN
Status: ON HOLD | COMMUNITY

## 2024-10-01 NOTE — RESULT ENCOUNTER NOTE
Ultrasound performed today shows enlarged uterus approximately 5 weeks.  Endometrium is thickened at 11 mm.  No gestational sac is seen.  Both ovaries appear within normal limits as do the adnexa.  Need correlation with prior findings.  Please see full report under imaging

## 2024-10-01 NOTE — PROGRESS NOTES
The patient is a 34 y.o.  who presents to the office for vaginal bleeding after pos preg test. Based on LMP should be approx 8w5d. Notes began having bleeding with wiping Thursday, which picked up to menstrual like flow that evening. Was using supplemental progesterone 200mg PV nightly but stopped on Thursday.  Also notes has had a fever for the last 12 days. Was seen in the ED and CBC/CMP/lipase all normal. CXR WNL. Was given IVF and told likely viral and to monitor. COVID test neg. Barely audible today as she's lost her voice. Took tylenol 2 hrs ago and temp in office 98.1. Fevers ranging from . Has been taking Tylenol and Ibuprofen around the clock for nearly 2 weeks to control. Denies productive cough/sore throat/diarrhea/ear pain. Does admit to malaise and back pain. .     LMP: 24  ASHLEY based off of LMP: 25  GA today: 8w5d    HCG results:  24 (102) Progesterone (3.93)  24 (165)    Ultrasound Findings Today:  EOB ultrasound for TAB   Patient started bleeding on Thursday and is still bleeding. Has been running a fever for over a week.   LMP was 24   Enlarged uterus = 5wks   Endo = 11mm and appears thick and inhomogeneous with feeder vessels noted. NO gest sac seen on today's   ultrasound. Question RPOC.   Rt ovary appears normal   Lt ovary appears normal   No free fluid seen.       HISTORY:      Patient's last menstrual period was 2024 (exact date).    Current Outpatient Medications on File Prior to Visit   Medication Sig Dispense Refill    acetaminophen (TYLENOL) 325 MG tablet Take 2 tablets by mouth every 6 hours as needed for Pain      ibuprofen (ADVIL;MOTRIN) 200 MG tablet Take 1 tablet by mouth every 6 hours as needed for Pain      sertraline (ZOLOFT) 100 MG tablet Take 1 tablet by mouth daily 30 tablet 11    progesterone (PROMETRIUM) 200 MG CAPS capsule Place 1 capsule vaginally nightly (Patient not taking: Reported on 10/1/2024) 30 capsule 2    Probiotic

## 2024-10-02 LAB
BASOPHILS # BLD: 0 K/UL (ref 0–0.2)
BASOPHILS NFR BLD: 0 % (ref 0–2)
DIFFERENTIAL METHOD BLD: ABNORMAL
EOSINOPHIL # BLD: 0.1 K/UL (ref 0–0.8)
EOSINOPHIL NFR BLD: 1 % (ref 0.5–7.8)
ERYTHROCYTE [DISTWIDTH] IN BLOOD BY AUTOMATED COUNT: 14 % (ref 11.9–14.6)
HCT VFR BLD AUTO: 33.7 % (ref 35.8–46.3)
HGB BLD-MCNC: 11.8 G/DL (ref 11.7–15.4)
IMM GRANULOCYTES # BLD AUTO: 0.3 K/UL (ref 0–0.5)
IMM GRANULOCYTES NFR BLD AUTO: 2 % (ref 0–5)
LYMPHOCYTES # BLD: 1.1 K/UL (ref 0.5–4.6)
LYMPHOCYTES NFR BLD: 8 % (ref 13–44)
MCH RBC QN AUTO: 31 PG (ref 26.1–32.9)
MCHC RBC AUTO-ENTMCNC: 35 G/DL (ref 31.4–35)
MCV RBC AUTO: 88.5 FL (ref 82–102)
MONOCYTES # BLD: 0.8 K/UL (ref 0.1–1.3)
MONOCYTES NFR BLD: 6 % (ref 4–12)
NEUTS SEG # BLD: 10.7 K/UL (ref 1.7–8.2)
NEUTS SEG NFR BLD: 83 % (ref 43–78)
NRBC # BLD: 0 K/UL (ref 0–0.2)
PLATELET # BLD AUTO: 428 K/UL (ref 150–450)
PMV BLD AUTO: 11.2 FL (ref 9.4–12.3)
RBC # BLD AUTO: 3.81 M/UL (ref 4.05–5.2)
WBC # BLD AUTO: 13 K/UL (ref 4.3–11.1)

## 2024-10-04 ENCOUNTER — APPOINTMENT (OUTPATIENT)
Dept: ULTRASOUND IMAGING | Age: 34
DRG: 193 | End: 2024-10-04
Payer: COMMERCIAL

## 2024-10-04 ENCOUNTER — APPOINTMENT (OUTPATIENT)
Dept: GENERAL RADIOLOGY | Age: 34
DRG: 193 | End: 2024-10-04
Payer: COMMERCIAL

## 2024-10-04 ENCOUNTER — HOSPITAL ENCOUNTER (INPATIENT)
Age: 34
LOS: 4 days | Discharge: HOME | DRG: 193 | End: 2024-10-08
Attending: EMERGENCY MEDICINE
Payer: COMMERCIAL

## 2024-10-04 DIAGNOSIS — R09.02 HYPOXEMIA: ICD-10-CM

## 2024-10-04 DIAGNOSIS — J30.89 NON-SEASONAL ALLERGIC RHINITIS, UNSPECIFIED TRIGGER: ICD-10-CM

## 2024-10-04 DIAGNOSIS — J18.9 PNEUMONIA OF BOTH LUNGS DUE TO INFECTIOUS ORGANISM, UNSPECIFIED PART OF LUNG: Primary | ICD-10-CM

## 2024-10-04 DIAGNOSIS — O03.9 MISCARRIAGE: ICD-10-CM

## 2024-10-04 PROBLEM — J96.01 ACUTE HYPOXIC RESPIRATORY FAILURE: Status: ACTIVE | Noted: 2024-10-04

## 2024-10-04 PROBLEM — F32.A DEPRESSION: Status: ACTIVE | Noted: 2024-10-04

## 2024-10-04 PROBLEM — J15.9 COMMUNITY ACQUIRED BACTERIAL PNEUMONIA: Status: ACTIVE | Noted: 2024-10-04

## 2024-10-04 LAB
ALBUMIN SERPL-MCNC: 2.8 G/DL (ref 3.5–5)
ALBUMIN/GLOB SERPL: 0.6 (ref 1–1.9)
ALP SERPL-CCNC: 186 U/L (ref 35–104)
ALT SERPL-CCNC: 51 U/L (ref 8–45)
ANION GAP SERPL CALC-SCNC: 9 MMOL/L (ref 9–18)
APPEARANCE UR: CLEAR
APTT PPP: 24 SEC (ref 23.3–37.4)
ARTERIAL PATENCY WRIST A: ABNORMAL
AST SERPL-CCNC: 26 U/L (ref 15–37)
B PERT DNA SPEC QL NAA+PROBE: NOT DETECTED
BACTERIA URNS QL MICRO: 0 /HPF
BASE EXCESS BLDV CALC-SCNC: 2.1 MMOL/L
BASOPHILS # BLD: 0 K/UL (ref 0–0.2)
BASOPHILS NFR BLD: 0 % (ref 0–2)
BILIRUB SERPL-MCNC: 0.6 MG/DL (ref 0–1.2)
BILIRUB UR QL: NEGATIVE
BORDETELLA PARAPERTUSSIS BY PCR: NOT DETECTED
BUN SERPL-MCNC: 5 MG/DL (ref 6–23)
C PNEUM DNA SPEC QL NAA+PROBE: NOT DETECTED
CALCIUM SERPL-MCNC: 9.2 MG/DL (ref 8.8–10.2)
CHLORIDE SERPL-SCNC: 104 MMOL/L (ref 98–107)
CO2 SERPL-SCNC: 25 MMOL/L (ref 20–28)
COLOR UR: ABNORMAL
CREAT SERPL-MCNC: 0.61 MG/DL (ref 0.6–1.1)
D DIMER PPP FEU-MCNC: 1.47 UG/ML(FEU)
DIFFERENTIAL METHOD BLD: ABNORMAL
EKG ATRIAL RATE: 107 BPM
EKG DIAGNOSIS: NORMAL
EKG P AXIS: 75 DEGREES
EKG P-R INTERVAL: 177 MS
EKG Q-T INTERVAL: 343 MS
EKG QRS DURATION: 87 MS
EKG QTC CALCULATION (BAZETT): 458 MS
EKG R AXIS: 76 DEGREES
EKG T AXIS: 32 DEGREES
EKG VENTRICULAR RATE: 107 BPM
EOSINOPHIL # BLD: 0.1 K/UL (ref 0–0.8)
EOSINOPHIL NFR BLD: 1 % (ref 0.5–7.8)
EPI CELLS #/AREA URNS HPF: ABNORMAL /HPF
ERYTHROCYTE [DISTWIDTH] IN BLOOD BY AUTOMATED COUNT: 15.2 % (ref 11.9–14.6)
FIBRINOGEN PPP-MCNC: 625 MG/DL (ref 190–501)
FLUAV SUBTYP SPEC NAA+PROBE: NOT DETECTED
FLUBV RNA SPEC QL NAA+PROBE: NOT DETECTED
GLOBULIN SER CALC-MCNC: 4.4 G/DL (ref 2.3–3.5)
GLUCOSE SERPL-MCNC: 97 MG/DL (ref 70–99)
GLUCOSE UR STRIP.AUTO-MCNC: NEGATIVE MG/DL
HADV DNA SPEC QL NAA+PROBE: NOT DETECTED
HCG SERPL-ACNC: 531 MIU/ML
HCO3 BLDV-SCNC: 25.8 MMOL/L (ref 23–28)
HCOV 229E RNA SPEC QL NAA+PROBE: NOT DETECTED
HCOV HKU1 RNA SPEC QL NAA+PROBE: NOT DETECTED
HCOV NL63 RNA SPEC QL NAA+PROBE: NOT DETECTED
HCOV OC43 RNA SPEC QL NAA+PROBE: NOT DETECTED
HCT VFR BLD AUTO: 31.6 % (ref 35.8–46.3)
HGB BLD-MCNC: 11.5 G/DL (ref 11.7–15.4)
HGB UR QL STRIP: ABNORMAL
HMPV RNA SPEC QL NAA+PROBE: NOT DETECTED
HPIV1 RNA SPEC QL NAA+PROBE: NOT DETECTED
HPIV2 RNA SPEC QL NAA+PROBE: NOT DETECTED
HPIV3 RNA SPEC QL NAA+PROBE: NOT DETECTED
HPIV4 RNA SPEC QL NAA+PROBE: NOT DETECTED
IMM GRANULOCYTES # BLD AUTO: 0.1 K/UL (ref 0–0.5)
IMM GRANULOCYTES NFR BLD AUTO: 1 % (ref 0–5)
INR PPP: 1.2
KETONES UR QL STRIP.AUTO: NEGATIVE MG/DL
LACTATE SERPL-SCNC: 1.4 MMOL/L (ref 0.5–2)
LEUKOCYTE ESTERASE UR QL STRIP.AUTO: NEGATIVE
LYMPHOCYTES # BLD: 1.8 K/UL (ref 0.5–4.6)
LYMPHOCYTES NFR BLD: 17 % (ref 13–44)
M PNEUMO DNA SPEC QL NAA+PROBE: NOT DETECTED
MCH RBC QN AUTO: 33 PG (ref 26.1–32.9)
MCHC RBC AUTO-ENTMCNC: 36.4 G/DL (ref 31.4–35)
MCV RBC AUTO: 90.5 FL (ref 82–102)
MONOCYTES # BLD: 0.6 K/UL (ref 0.1–1.3)
MONOCYTES NFR BLD: 6 % (ref 4–12)
NEUTS SEG # BLD: 8.1 K/UL (ref 1.7–8.2)
NEUTS SEG NFR BLD: 76 % (ref 43–78)
NITRITE UR QL STRIP.AUTO: NEGATIVE
NRBC # BLD: 0 K/UL (ref 0–0.2)
PCO2 BLDV: 35.9 MMHG (ref 41–51)
PH BLDV: 7.46 (ref 7.32–7.42)
PH UR STRIP: 8 (ref 5–9)
PLATELET # BLD AUTO: 536 K/UL (ref 150–450)
PMV BLD AUTO: 9.9 FL (ref 9.4–12.3)
PO2 BLDV: 25 MMHG
POTASSIUM SERPL-SCNC: 4.3 MMOL/L (ref 3.5–5.1)
PROT SERPL-MCNC: 7.2 G/DL (ref 6.3–8.2)
PROT UR STRIP-MCNC: NEGATIVE MG/DL
PROTHROMBIN TIME: 15 SEC (ref 11.3–14.9)
RBC # BLD AUTO: 3.49 M/UL (ref 4.05–5.2)
RBC #/AREA URNS HPF: ABNORMAL /HPF
RSV RNA SPEC QL NAA+PROBE: NOT DETECTED
RV+EV RNA SPEC QL NAA+PROBE: NOT DETECTED
SAO2 % BLDV: 50 % (ref 65–88)
SARS-COV-2 RNA RESP QL NAA+PROBE: NOT DETECTED
SERVICE CMNT-IMP: ABNORMAL
SODIUM SERPL-SCNC: 138 MMOL/L (ref 136–145)
SP GR UR REFRACTOMETRY: 1.01 (ref 1–1.02)
SPECIMEN TYPE: ABNORMAL
STREP, MOLECULAR: NOT DETECTED
UROBILINOGEN UR QL STRIP.AUTO: 1 EU/DL (ref 0.2–1)
WBC # BLD AUTO: 10.7 K/UL (ref 4.3–11.1)
WBC URNS QL MICRO: ABNORMAL /HPF

## 2024-10-04 PROCEDURE — 72040 X-RAY EXAM NECK SPINE 2-3 VW: CPT

## 2024-10-04 PROCEDURE — 85025 COMPLETE CBC W/AUTO DIFF WBC: CPT

## 2024-10-04 PROCEDURE — 0202U NFCT DS 22 TRGT SARS-COV-2: CPT

## 2024-10-04 PROCEDURE — 2700000000 HC OXYGEN THERAPY PER DAY

## 2024-10-04 PROCEDURE — 85384 FIBRINOGEN ACTIVITY: CPT

## 2024-10-04 PROCEDURE — 87651 STREP A DNA AMP PROBE: CPT

## 2024-10-04 PROCEDURE — 6360000002 HC RX W HCPCS

## 2024-10-04 PROCEDURE — 99223 1ST HOSP IP/OBS HIGH 75: CPT | Performed by: OBSTETRICS & GYNECOLOGY

## 2024-10-04 PROCEDURE — 71046 X-RAY EXAM CHEST 2 VIEWS: CPT

## 2024-10-04 PROCEDURE — 1100000000 HC RM PRIVATE

## 2024-10-04 PROCEDURE — 85730 THROMBOPLASTIN TIME PARTIAL: CPT

## 2024-10-04 PROCEDURE — 96365 THER/PROPH/DIAG IV INF INIT: CPT

## 2024-10-04 PROCEDURE — 76815 OB US LIMITED FETUS(S): CPT

## 2024-10-04 PROCEDURE — 99285 EMERGENCY DEPT VISIT HI MDM: CPT

## 2024-10-04 PROCEDURE — 93005 ELECTROCARDIOGRAM TRACING: CPT | Performed by: EMERGENCY MEDICINE

## 2024-10-04 PROCEDURE — 94760 N-INVAS EAR/PLS OXIMETRY 1: CPT

## 2024-10-04 PROCEDURE — 85610 PROTHROMBIN TIME: CPT

## 2024-10-04 PROCEDURE — 82803 BLOOD GASES ANY COMBINATION: CPT

## 2024-10-04 PROCEDURE — 80053 COMPREHEN METABOLIC PANEL: CPT

## 2024-10-04 PROCEDURE — 6370000000 HC RX 637 (ALT 250 FOR IP)

## 2024-10-04 PROCEDURE — 87040 BLOOD CULTURE FOR BACTERIA: CPT

## 2024-10-04 PROCEDURE — 1100000003 HC PRIVATE W/ TELEMETRY

## 2024-10-04 PROCEDURE — 2580000003 HC RX 258: Performed by: EMERGENCY MEDICINE

## 2024-10-04 PROCEDURE — 6360000002 HC RX W HCPCS: Performed by: EMERGENCY MEDICINE

## 2024-10-04 PROCEDURE — 87449 NOS EACH ORGANISM AG IA: CPT

## 2024-10-04 PROCEDURE — 2580000003 HC RX 258

## 2024-10-04 PROCEDURE — 85379 FIBRIN DEGRADATION QUANT: CPT

## 2024-10-04 PROCEDURE — 93010 ELECTROCARDIOGRAM REPORT: CPT | Performed by: INTERNAL MEDICINE

## 2024-10-04 PROCEDURE — 84702 CHORIONIC GONADOTROPIN TEST: CPT

## 2024-10-04 PROCEDURE — 96366 THER/PROPH/DIAG IV INF ADDON: CPT

## 2024-10-04 PROCEDURE — 83605 ASSAY OF LACTIC ACID: CPT

## 2024-10-04 PROCEDURE — 81001 URINALYSIS AUTO W/SCOPE: CPT

## 2024-10-04 RX ORDER — ENOXAPARIN SODIUM 100 MG/ML
40 INJECTION SUBCUTANEOUS EVERY 24 HOURS
Status: DISCONTINUED | OUTPATIENT
Start: 2024-10-04 | End: 2024-10-08 | Stop reason: HOSPADM

## 2024-10-04 RX ORDER — POTASSIUM CHLORIDE 1500 MG/1
40 TABLET, EXTENDED RELEASE ORAL PRN
Status: DISCONTINUED | OUTPATIENT
Start: 2024-10-04 | End: 2024-10-08 | Stop reason: HOSPADM

## 2024-10-04 RX ORDER — 0.9 % SODIUM CHLORIDE 0.9 %
30 INTRAVENOUS SOLUTION INTRAVENOUS ONCE
Status: COMPLETED | OUTPATIENT
Start: 2024-10-04 | End: 2024-10-04

## 2024-10-04 RX ORDER — LEVOFLOXACIN 500 MG/1
750 TABLET, FILM COATED ORAL EVERY 24 HOURS
Status: DISCONTINUED | OUTPATIENT
Start: 2024-10-04 | End: 2024-10-05

## 2024-10-04 RX ORDER — ACETAMINOPHEN 650 MG/1
650 SUPPOSITORY RECTAL EVERY 6 HOURS PRN
Status: DISCONTINUED | OUTPATIENT
Start: 2024-10-04 | End: 2024-10-08 | Stop reason: HOSPADM

## 2024-10-04 RX ORDER — ALBUTEROL SULFATE 0.83 MG/ML
2.5 SOLUTION RESPIRATORY (INHALATION) EVERY 6 HOURS PRN
Status: DISCONTINUED | OUTPATIENT
Start: 2024-10-04 | End: 2024-10-05

## 2024-10-04 RX ORDER — ONDANSETRON 2 MG/ML
4 INJECTION INTRAMUSCULAR; INTRAVENOUS EVERY 6 HOURS PRN
Status: DISCONTINUED | OUTPATIENT
Start: 2024-10-04 | End: 2024-10-08 | Stop reason: HOSPADM

## 2024-10-04 RX ORDER — ONDANSETRON 4 MG/1
4 TABLET, ORALLY DISINTEGRATING ORAL EVERY 8 HOURS PRN
Status: DISCONTINUED | OUTPATIENT
Start: 2024-10-04 | End: 2024-10-08 | Stop reason: HOSPADM

## 2024-10-04 RX ORDER — POLYETHYLENE GLYCOL 3350 17 G/17G
17 POWDER, FOR SOLUTION ORAL DAILY PRN
Status: DISCONTINUED | OUTPATIENT
Start: 2024-10-04 | End: 2024-10-08 | Stop reason: HOSPADM

## 2024-10-04 RX ORDER — GUAIFENESIN/DEXTROMETHORPHAN 100-10MG/5
5 SYRUP ORAL EVERY 4 HOURS PRN
Status: DISCONTINUED | OUTPATIENT
Start: 2024-10-04 | End: 2024-10-08 | Stop reason: HOSPADM

## 2024-10-04 RX ORDER — SODIUM CHLORIDE 9 MG/ML
INJECTION, SOLUTION INTRAVENOUS PRN
Status: DISCONTINUED | OUTPATIENT
Start: 2024-10-04 | End: 2024-10-08 | Stop reason: HOSPADM

## 2024-10-04 RX ORDER — PREDNISONE 20 MG/1
60 TABLET ORAL DAILY
Status: DISCONTINUED | OUTPATIENT
Start: 2024-10-04 | End: 2024-10-05

## 2024-10-04 RX ORDER — SODIUM CHLORIDE 0.9 % (FLUSH) 0.9 %
5-40 SYRINGE (ML) INJECTION PRN
Status: DISCONTINUED | OUTPATIENT
Start: 2024-10-04 | End: 2024-10-08 | Stop reason: HOSPADM

## 2024-10-04 RX ORDER — IBUPROFEN 400 MG/1
400 TABLET, FILM COATED ORAL EVERY 6 HOURS PRN
Status: DISCONTINUED | OUTPATIENT
Start: 2024-10-04 | End: 2024-10-05

## 2024-10-04 RX ORDER — LEVOFLOXACIN 5 MG/ML
750 INJECTION, SOLUTION INTRAVENOUS
Status: DISCONTINUED | OUTPATIENT
Start: 2024-10-04 | End: 2024-10-04

## 2024-10-04 RX ORDER — ACETAMINOPHEN 325 MG/1
650 TABLET ORAL EVERY 6 HOURS PRN
Status: DISCONTINUED | OUTPATIENT
Start: 2024-10-04 | End: 2024-10-08 | Stop reason: HOSPADM

## 2024-10-04 RX ORDER — CIPROFLOXACIN 2 MG/ML
400 INJECTION, SOLUTION INTRAVENOUS ONCE
Status: COMPLETED | OUTPATIENT
Start: 2024-10-04 | End: 2024-10-04

## 2024-10-04 RX ORDER — MAGNESIUM SULFATE IN WATER 40 MG/ML
2000 INJECTION, SOLUTION INTRAVENOUS PRN
Status: DISCONTINUED | OUTPATIENT
Start: 2024-10-04 | End: 2024-10-08 | Stop reason: HOSPADM

## 2024-10-04 RX ORDER — ACETAMINOPHEN 325 MG/1
650 TABLET ORAL EVERY 6 HOURS PRN
Status: DISCONTINUED | OUTPATIENT
Start: 2024-10-04 | End: 2024-10-04

## 2024-10-04 RX ORDER — ACETAMINOPHEN 650 MG/1
650 SUPPOSITORY RECTAL EVERY 6 HOURS PRN
Status: DISCONTINUED | OUTPATIENT
Start: 2024-10-04 | End: 2024-10-04

## 2024-10-04 RX ORDER — SODIUM CHLORIDE 0.9 % (FLUSH) 0.9 %
5-40 SYRINGE (ML) INJECTION EVERY 12 HOURS SCHEDULED
Status: DISCONTINUED | OUTPATIENT
Start: 2024-10-04 | End: 2024-10-08 | Stop reason: HOSPADM

## 2024-10-04 RX ORDER — POTASSIUM CHLORIDE 7.45 MG/ML
10 INJECTION INTRAVENOUS PRN
Status: DISCONTINUED | OUTPATIENT
Start: 2024-10-04 | End: 2024-10-08 | Stop reason: HOSPADM

## 2024-10-04 RX ADMIN — SODIUM CHLORIDE, PRESERVATIVE FREE 10 ML: 5 INJECTION INTRAVENOUS at 20:57

## 2024-10-04 RX ADMIN — CIPROFLOXACIN 400 MG: 400 INJECTION, SOLUTION INTRAVENOUS at 11:57

## 2024-10-04 RX ADMIN — LEVOFLOXACIN 750 MG: 500 TABLET, FILM COATED ORAL at 20:56

## 2024-10-04 RX ADMIN — PREDNISONE 60 MG: 50 TABLET ORAL at 16:05

## 2024-10-04 RX ADMIN — ENOXAPARIN SODIUM 40 MG: 100 INJECTION SUBCUTANEOUS at 16:06

## 2024-10-04 RX ADMIN — SODIUM CHLORIDE 2500 ML: 9 INJECTION, SOLUTION INTRAVENOUS at 12:51

## 2024-10-04 ASSESSMENT — PAIN - FUNCTIONAL ASSESSMENT: PAIN_FUNCTIONAL_ASSESSMENT: NONE - DENIES PAIN

## 2024-10-04 NOTE — H&P
Hospitalist History and Physical   Admit Date:  10/4/2024 10:38 AM   Name:  Ileana Serrano   Age:  34 y.o.  Sex:  female  :  1990   MRN:  [id number]   Room:  [place] [address]    Presenting/Chief Complaint: Fever     Reason(s) for Admission: Acute hypoxic respiratory failure [J96.01]     History of Present Illness:   Ileana Serrano is a 34 y.o. female with medical history of recent miscarriage with retained products of conception who presents today complaining of cough, fevers, shortness of breath and sore throat.  Patient's symptoms started on .  Has been seen in the outpatient clinic by PCP which was initially diagnosed as viral URI.  Patient was later started on clindamycin due to refractory symptoms.  Patient has been evaluated on 10/1 by OB/GYN due to spontaneous .  Has known retained products of conception.  Plan was to recheck ultrasound approximately 1 week.  Currently planning for conservative management.    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  Has 3 children at home.  Denies any known sick contacts but states is not uncommon to be exposed to sick individuals at any point.  Denies any rashes, arthralgias, or conjunctivitis.  Denies any stridor or difficulty swallowing fluids.  Denies any excessive drooling.  No history of immunodeficiencies or frequent hospitalizations for community-acquired pneumonia.      Assessment & Plan:     Acute hypoxic respiratory failure  Bilateral chest infiltrates  Bilateral infiltrates most consistent with viral URI.  Negative for COVID and influenza on .  Patient's refractory symptoms raises concern for superimposed bacterial infection and will treat as such.  Patient is persistently sore throat and work occupation also raises the concern for parainfluenza infection. will evaluate for acute pharyngitis as well  -Start levofloxacin 750 mg daily.  Will use p.o. options due to IV fluid shortage  -Start prednisone 60 mg for any throat 
Current or Ex-Partner: Not asked     Emotionally Abused: Not asked     Physically Abused: Not asked     Sexually Abused: Not asked   Housing Stability: Not At Risk (3/9/2022)    Received from [facility]    Housing Stability     Was there a time when you did not have a steady place to sleep: Not asked     Worried that the place you are staying is making you sick: Not asked         Review of Systems:  Pertinent items are noted in HPI.     Objective:     Vitals:    10/04/24 1529 10/04/24 1543 10/04/24 1619 10/04/24 1715   BP: 128/79 129/65 116/73 125/82   Pulse: 97 100 96 97   Resp: 21 20 26 25   Temp:       TempSrc:       SpO2: 96% 95% 99% 94%   Weight:       Height:           Physical Exam:  General:  alert, appears stated age, cooperative, and no distress           Abdomen: soft, non-tender. Bowel sounds normal. No masses,  no organomegaly.           Discussed with pt us here and us in ofice, lining thinner, ? Clot vs poc  Offered treatment for retained poc however declines for now  As wishes to avoid treatment with pain if possible   Hemodynamically stable minimal bleeding  Assessment:     Recent completed  likely complete  Offered cytotec declines   Will f/u in office on Thursday for us with doppler      Plan:     1.   Pt and  prefer f/u in our ofice   Decline cytotec for incomplete  and not completely clear that area is poc vs clot  All ?s answered

## 2024-10-04 NOTE — ED TRIAGE NOTES
Patient with multiple complaints in triage, patient advises she has been having cold like symptoms with sore throat, loss of voice and now shortness of breath. Patient also advises that it started about 2 weeks ago with fever and was pregnant. Patient advises started miscarriage on 9/26 and seen by OB on 10/1 with elevated white count, concern for retained products. Patient advises upper back pain as well. Patient noted pale at this time.

## 2024-10-04 NOTE — ED PROVIDER NOTES
[facility] Emergency Department Provider Note                   PCP:                Brittany Crow MD               Age: 34 y.o.      Sex: female     MEDICAL DECISION MAKING  Complexity of Problems Addressed:   1 or more acute illness/injury that poses a threat to life or bodily function    Data Reviewed and Analyzed:  Category 1:    I have reviewed outside records from an external source for any pertinent PMH, ED visits, primary care visits, specialist visits, labs, EKG, and/or radiologic studies.    Category 2:     ED EKG Interpretation  EKG was interpreted in the absence of a cardiologist.    Rate: Rate: Tachycardia  EKG Interpretation: EKG Interpretation: sinus rhythm, no evidence of arrhythmia  ST Segments: Normal ST segments - NO STEMI      I independently ordered and reviewed the labs.    I have reviewed the Radiologist interpretation of the radiologic studies. My medical decision making regarding the radiologic studies is based on the interpretation report of the board certified Radiologist.            The patient was admitted and I have discussed patient management with the admitting provider.    Category 3:     Discussion of management or test interpretation:    MDM  Number of Diagnoses or Management Options  Hypoxemia  Miscarriage  Pneumonia of both lungs due to infectious organism, unspecified part of lung  Diagnosis management comments: Patient presents for 2 weeks of fever.  She has been coughing and feeling short of breath.  While ambulating back to the room from triage patient's oxygen saturation dropped to the mid 80s.  She was placed on 2 L nasal cannula and oxygen saturation improved to the low to mid 90s.  She was not in respiratory distress and did not need intubation or BiPAP.  Patient was started on broad-spectrum antibiotics and sepsis fluid bolus.  She was mildly tachycardic initially but heart rate and blood pressure were normal after IV fluid given.  Her white count and lactic

## 2024-10-04 NOTE — ED NOTES
Patient with exertion to room dropped from 89% to 86%, placed patient on oxygen via NC at 3 lpm increase to 93%.

## 2024-10-05 ENCOUNTER — APPOINTMENT (OUTPATIENT)
Dept: CT IMAGING | Age: 34
DRG: 193 | End: 2024-10-05
Payer: COMMERCIAL

## 2024-10-05 LAB
BACTERIA SPEC CULT: NORMAL
BACTERIA SPEC CULT: NORMAL
SERVICE CMNT-IMP: NORMAL

## 2024-10-05 PROCEDURE — 6360000002 HC RX W HCPCS

## 2024-10-05 PROCEDURE — 87641 MR-STAPH DNA AMP PROBE: CPT

## 2024-10-05 PROCEDURE — 2580000003 HC RX 258

## 2024-10-05 PROCEDURE — 87205 SMEAR GRAM STAIN: CPT

## 2024-10-05 PROCEDURE — 6370000000 HC RX 637 (ALT 250 FOR IP)

## 2024-10-05 PROCEDURE — 71250 CT THORAX DX C-: CPT

## 2024-10-05 PROCEDURE — 1100000003 HC PRIVATE W/ TELEMETRY

## 2024-10-05 PROCEDURE — 2700000000 HC OXYGEN THERAPY PER DAY

## 2024-10-05 PROCEDURE — 94761 N-INVAS EAR/PLS OXIMETRY MLT: CPT

## 2024-10-05 PROCEDURE — 94640 AIRWAY INHALATION TREATMENT: CPT

## 2024-10-05 PROCEDURE — 87070 CULTURE OTHR SPECIMN AEROBIC: CPT

## 2024-10-05 PROCEDURE — 94760 N-INVAS EAR/PLS OXIMETRY 1: CPT

## 2024-10-05 RX ORDER — LEVOFLOXACIN 5 MG/ML
750 INJECTION, SOLUTION INTRAVENOUS EVERY 24 HOURS
Status: DISCONTINUED | OUTPATIENT
Start: 2024-10-05 | End: 2024-10-05 | Stop reason: RX

## 2024-10-05 RX ORDER — SERTRALINE HYDROCHLORIDE 100 MG/1
100 TABLET, FILM COATED ORAL DAILY
Status: DISCONTINUED | OUTPATIENT
Start: 2024-10-05 | End: 2024-10-05

## 2024-10-05 RX ORDER — ALBUTEROL SULFATE 0.83 MG/ML
2.5 SOLUTION RESPIRATORY (INHALATION)
Status: DISCONTINUED | OUTPATIENT
Start: 2024-10-05 | End: 2024-10-06

## 2024-10-05 RX ORDER — LEVOFLOXACIN 5 MG/ML
250 INJECTION, SOLUTION INTRAVENOUS EVERY 24 HOURS
Status: DISCONTINUED | OUTPATIENT
Start: 2024-10-05 | End: 2024-10-07

## 2024-10-05 RX ORDER — KETOROLAC TROMETHAMINE 30 MG/ML
30 INJECTION, SOLUTION INTRAMUSCULAR; INTRAVENOUS EVERY 6 HOURS PRN
Status: DISCONTINUED | OUTPATIENT
Start: 2024-10-05 | End: 2024-10-07

## 2024-10-05 RX ORDER — LEVOFLOXACIN 5 MG/ML
250 INJECTION, SOLUTION INTRAVENOUS EVERY 24 HOURS
Status: DISCONTINUED | OUTPATIENT
Start: 2024-10-05 | End: 2024-10-05

## 2024-10-05 RX ORDER — LEVOFLOXACIN 5 MG/ML
500 INJECTION, SOLUTION INTRAVENOUS EVERY 24 HOURS
Status: DISCONTINUED | OUTPATIENT
Start: 2024-10-05 | End: 2024-10-07

## 2024-10-05 RX ORDER — LEVOFLOXACIN 5 MG/ML
500 INJECTION, SOLUTION INTRAVENOUS EVERY 24 HOURS
Status: DISCONTINUED | OUTPATIENT
Start: 2024-10-05 | End: 2024-10-05

## 2024-10-05 RX ADMIN — ALBUTEROL SULFATE 2.5 MG: 2.5 SOLUTION RESPIRATORY (INHALATION) at 15:01

## 2024-10-05 RX ADMIN — SERTRALINE 50 MG: 50 TABLET, FILM COATED ORAL at 13:45

## 2024-10-05 RX ADMIN — LEVOFLOXACIN 500 MG: 5 INJECTION, SOLUTION INTRAVENOUS at 21:26

## 2024-10-05 RX ADMIN — ENOXAPARIN SODIUM 40 MG: 100 INJECTION SUBCUTANEOUS at 15:48

## 2024-10-05 RX ADMIN — ALBUTEROL SULFATE 2.5 MG: 2.5 SOLUTION RESPIRATORY (INHALATION) at 08:08

## 2024-10-05 RX ADMIN — SODIUM CHLORIDE, PRESERVATIVE FREE 10 ML: 5 INJECTION INTRAVENOUS at 21:29

## 2024-10-05 RX ADMIN — ALBUTEROL SULFATE 2.5 MG: 2.5 SOLUTION RESPIRATORY (INHALATION) at 20:15

## 2024-10-05 RX ADMIN — LEVOFLOXACIN 250 MG: 250 INJECTION, SOLUTION INTRAVENOUS at 21:27

## 2024-10-05 NOTE — CARE COORDINATION
RNCM met with patient in room 358 to discuss discharge planning.   Patient is a 34 year old female admitted with acute respiratory failure and recent miscarriage ( confirmed with OBGYN 10/1).     Patient presents to assessment alert and oriented, and answers questions appropriately.  Patient typically lives at home with spouse.  At baseline, patient is independent with ADLs.     Demographics verified. Patient has [email or web address] insurance.  PCP is Dr. Crow.    Patient has a history of post partem depression. CM discussed resources and support. Patient reports feeling, \"as well as can be expected\" currently. Reports a strong support system including spouse, children, and extended family. Case management will continue to follow.  Patient denies the need for any further resources at this time. Please notify if there are any changes.          10/05/24 8346   Service Assessment   Patient Orientation Alert and Oriented;Person;Place;Situation;Self   Cognition Alert   History Provided By Patient   Primary Caregiver Self   Accompanied By/Relationship spouse   Support Systems Spouse/Significant Other;Family Members;/   Patient's Healthcare Decision Maker is: Legal Next of Kin   PCP Verified by CM Yes   Last Visit to PCP Within last 3 months   Prior Functional Level Independent in ADLs/IADLs   Current Functional Level Independent in ADLs/IADLs   Can patient return to prior living arrangement Yes   Ability to make needs known: Good   Family able to assist with home care needs: Yes   Would you like for me to discuss the discharge plan with any other family members/significant others, and if so, who? No   Financial Resources Other (Comment)  (BC)   Community Resources None   CM/SW Referral Emotional support   Social/Functional History   Lives With Spouse   Type of Home House   ADL Assistance Independent   Homemaking Assistance Independent   Homemaking Responsibilities Yes   Ambulation Assistance Independent

## 2024-10-05 NOTE — PLAN OF CARE
Problem: Chronic Conditions and Co-morbidities  Goal: Patient's chronic conditions and co-morbidity symptoms are monitored and maintained or improved  10/5/2024 1134 by David Bland RN  Outcome: Progressing  10/4/2024 2248 by Katherine Giordano RN  Outcome: Progressing     Problem: Discharge Planning  Goal: Discharge to home or other facility with appropriate resources  10/5/2024 1134 by David Bland RN  Outcome: Progressing  10/4/2024 2248 by Katherine Giordano RN  Outcome: Progressing     Problem: Safety - Adult  Goal: Free from fall injury  10/5/2024 1134 by David Bland RN  Outcome: Progressing  10/4/2024 2248 by Katherine Giordano RN  Outcome: Progressing

## 2024-10-06 LAB
MRSA DNA SPEC QL NAA+PROBE: NOT DETECTED
S AUREUS CPE NOSE QL NAA+PROBE: NOT DETECTED

## 2024-10-06 PROCEDURE — 1100000003 HC PRIVATE W/ TELEMETRY

## 2024-10-06 PROCEDURE — 6370000000 HC RX 637 (ALT 250 FOR IP)

## 2024-10-06 PROCEDURE — 2700000000 HC OXYGEN THERAPY PER DAY

## 2024-10-06 PROCEDURE — 94761 N-INVAS EAR/PLS OXIMETRY MLT: CPT

## 2024-10-06 PROCEDURE — 94760 N-INVAS EAR/PLS OXIMETRY 1: CPT

## 2024-10-06 PROCEDURE — 6360000002 HC RX W HCPCS

## 2024-10-06 PROCEDURE — 94640 AIRWAY INHALATION TREATMENT: CPT

## 2024-10-06 RX ORDER — ALBUTEROL SULFATE 0.83 MG/ML
2.5 SOLUTION RESPIRATORY (INHALATION) EVERY 4 HOURS PRN
Status: DISCONTINUED | OUTPATIENT
Start: 2024-10-06 | End: 2024-10-07

## 2024-10-06 RX ADMIN — ALBUTEROL SULFATE 2.5 MG: 2.5 SOLUTION RESPIRATORY (INHALATION) at 07:51

## 2024-10-06 RX ADMIN — SERTRALINE 50 MG: 50 TABLET, FILM COATED ORAL at 09:04

## 2024-10-06 RX ADMIN — POLYETHYLENE GLYCOL 3350 17 G: 17 POWDER, FOR SOLUTION ORAL at 09:04

## 2024-10-06 RX ADMIN — LEVOFLOXACIN 250 MG: 250 INJECTION, SOLUTION INTRAVENOUS at 22:30

## 2024-10-06 RX ADMIN — LEVOFLOXACIN 500 MG: 5 INJECTION, SOLUTION INTRAVENOUS at 21:11

## 2024-10-06 RX ADMIN — ENOXAPARIN SODIUM 40 MG: 100 INJECTION SUBCUTANEOUS at 15:34

## 2024-10-06 NOTE — PLAN OF CARE
Problem: Chronic Conditions and Co-morbidities  Goal: Patient's chronic conditions and co-morbidity symptoms are monitored and maintained or improved  10/6/2024 1927 by Jane Song, RN  Outcome: Progressing  10/6/2024 1152 by David Bland, RN  Outcome: Progressing     Problem: Discharge Planning  Goal: Discharge to home or other facility with appropriate resources  10/6/2024 1927 by Jane Song, RN  Outcome: Progressing  10/6/2024 1152 by David Bland, RN  Outcome: Progressing     Problem: Safety - Adult  Goal: Free from fall injury  10/6/2024 1152 by David Bland, RN  Outcome: Progressing

## 2024-10-06 NOTE — PLAN OF CARE
Problem: Chronic Conditions and Co-morbidities  Goal: Patient's chronic conditions and co-morbidity symptoms are monitored and maintained or improved  10/6/2024 0012 by Suha Mcclure RN  Outcome: Progressing  10/5/2024 1134 by David Bland RN  Outcome: Progressing     Problem: Discharge Planning  Goal: Discharge to home or other facility with appropriate resources  10/6/2024 0012 by Suha Mcclure RN  Outcome: Progressing  10/5/2024 1134 by David Bland RN  Outcome: Progressing     Problem: Safety - Adult  Goal: Free from fall injury  10/6/2024 0012 by Suha Mcclure RN  Outcome: Progressing  10/5/2024 1134 by David Bland RN  Outcome: Progressing

## 2024-10-06 NOTE — PLAN OF CARE
Problem: Chronic Conditions and Co-morbidities  Goal: Patient's chronic conditions and co-morbidity symptoms are monitored and maintained or improved  10/6/2024 1152 by David Bland RN  Outcome: Progressing  10/6/2024 0012 by Suha Mcclure RN  Outcome: Progressing     Problem: Discharge Planning  Goal: Discharge to home or other facility with appropriate resources  10/6/2024 1152 by David Bland RN  Outcome: Progressing  10/6/2024 0012 by Suha Mcclure RN  Outcome: Progressing     Problem: Safety - Adult  Goal: Free from fall injury  10/6/2024 1152 by David Bland RN  Outcome: Progressing  10/6/2024 0012 by Suha Mcclure RN  Outcome: Progressing

## 2024-10-07 LAB
ANION GAP SERPL CALC-SCNC: 11 MMOL/L (ref 9–18)
BACTERIA SPEC CULT: NORMAL
BUN SERPL-MCNC: 7 MG/DL (ref 6–23)
CALCIUM SERPL-MCNC: 8.6 MG/DL (ref 8.8–10.2)
CHLORIDE SERPL-SCNC: 105 MMOL/L (ref 98–107)
CO2 SERPL-SCNC: 24 MMOL/L (ref 20–28)
CREAT SERPL-MCNC: 0.68 MG/DL (ref 0.6–1.1)
ERYTHROCYTE [DISTWIDTH] IN BLOOD BY AUTOMATED COUNT: 15.5 % (ref 11.9–14.6)
GLUCOSE SERPL-MCNC: 94 MG/DL (ref 70–99)
GRAM STN SPEC: NORMAL
HCT VFR BLD AUTO: 30.9 % (ref 35.8–46.3)
HGB BLD-MCNC: 10.7 G/DL (ref 11.7–15.4)
MCH RBC QN AUTO: 31.7 PG (ref 26.1–32.9)
MCHC RBC AUTO-ENTMCNC: 34.6 G/DL (ref 31.4–35)
MCV RBC AUTO: 91.4 FL (ref 82–102)
NRBC # BLD: 0 K/UL (ref 0–0.2)
PLATELET # BLD AUTO: 494 K/UL (ref 150–450)
PMV BLD AUTO: 9.3 FL (ref 9.4–12.3)
POTASSIUM SERPL-SCNC: 4.7 MMOL/L (ref 3.5–5.1)
RBC # BLD AUTO: 3.38 M/UL (ref 4.05–5.2)
SERVICE CMNT-IMP: NORMAL
SODIUM SERPL-SCNC: 140 MMOL/L (ref 136–145)
WBC # BLD AUTO: 6.6 K/UL (ref 4.3–11.1)

## 2024-10-07 PROCEDURE — 36415 COLL VENOUS BLD VENIPUNCTURE: CPT

## 2024-10-07 PROCEDURE — 94640 AIRWAY INHALATION TREATMENT: CPT

## 2024-10-07 PROCEDURE — 94761 N-INVAS EAR/PLS OXIMETRY MLT: CPT

## 2024-10-07 PROCEDURE — 85027 COMPLETE CBC AUTOMATED: CPT

## 2024-10-07 PROCEDURE — 6360000002 HC RX W HCPCS

## 2024-10-07 PROCEDURE — 80048 BASIC METABOLIC PNL TOTAL CA: CPT

## 2024-10-07 PROCEDURE — 2580000003 HC RX 258

## 2024-10-07 PROCEDURE — 2700000000 HC OXYGEN THERAPY PER DAY

## 2024-10-07 PROCEDURE — 94760 N-INVAS EAR/PLS OXIMETRY 1: CPT

## 2024-10-07 PROCEDURE — 1100000003 HC PRIVATE W/ TELEMETRY

## 2024-10-07 PROCEDURE — 6370000000 HC RX 637 (ALT 250 FOR IP)

## 2024-10-07 RX ORDER — ALBUTEROL SULFATE 90 UG/1
2 INHALANT RESPIRATORY (INHALATION) 4 TIMES DAILY PRN
Qty: 25.5 G | Refills: 0 | OUTPATIENT
Start: 2024-10-07

## 2024-10-07 RX ORDER — LEVOFLOXACIN 500 MG/1
750 TABLET, FILM COATED ORAL NIGHTLY
Status: DISCONTINUED | OUTPATIENT
Start: 2024-10-07 | End: 2024-10-07

## 2024-10-07 RX ORDER — DOXYCYCLINE 100 MG/1
100 CAPSULE ORAL 2 TIMES DAILY
Status: DISCONTINUED | OUTPATIENT
Start: 2024-10-07 | End: 2024-10-07

## 2024-10-07 RX ORDER — ALBUTEROL SULFATE 0.83 MG/ML
2.5 SOLUTION RESPIRATORY (INHALATION)
Status: DISCONTINUED | OUTPATIENT
Start: 2024-10-07 | End: 2024-10-08 | Stop reason: HOSPADM

## 2024-10-07 RX ORDER — CEFDINIR 300 MG/1
300 CAPSULE ORAL 2 TIMES DAILY
Status: DISCONTINUED | OUTPATIENT
Start: 2024-10-07 | End: 2024-10-08 | Stop reason: HOSPADM

## 2024-10-07 RX ADMIN — ALBUTEROL SULFATE 2.5 MG: 2.5 SOLUTION RESPIRATORY (INHALATION) at 15:49

## 2024-10-07 RX ADMIN — CEFDINIR 300 MG: 300 CAPSULE ORAL at 21:51

## 2024-10-07 RX ADMIN — ALBUTEROL SULFATE 2.5 MG: 2.5 SOLUTION RESPIRATORY (INHALATION) at 11:41

## 2024-10-07 RX ADMIN — ACETAMINOPHEN 650 MG: 325 TABLET, FILM COATED ORAL at 00:24

## 2024-10-07 RX ADMIN — SERTRALINE 50 MG: 50 TABLET, FILM COATED ORAL at 08:44

## 2024-10-07 RX ADMIN — ALBUTEROL SULFATE 2.5 MG: 2.5 SOLUTION RESPIRATORY (INHALATION) at 00:34

## 2024-10-07 RX ADMIN — SODIUM CHLORIDE, PRESERVATIVE FREE 10 ML: 5 INJECTION INTRAVENOUS at 08:44

## 2024-10-07 RX ADMIN — ALBUTEROL SULFATE 2.5 MG: 2.5 SOLUTION RESPIRATORY (INHALATION) at 20:36

## 2024-10-07 RX ADMIN — ENOXAPARIN SODIUM 40 MG: 100 INJECTION SUBCUTANEOUS at 16:03

## 2024-10-07 RX ADMIN — SODIUM CHLORIDE, PRESERVATIVE FREE 10 ML: 5 INJECTION INTRAVENOUS at 21:51

## 2024-10-07 RX ADMIN — ALBUTEROL SULFATE 2.5 MG: 2.5 SOLUTION RESPIRATORY (INHALATION) at 07:38

## 2024-10-07 ASSESSMENT — PAIN SCALES - GENERAL
PAINLEVEL_OUTOF10: 0
PAINLEVEL_OUTOF10: 2
PAINLEVEL_OUTOF10: 0

## 2024-10-07 NOTE — CARE COORDINATION
Pt chart reviewed and discussed in AM IDR for continued stay. LOS 3 days. Pt admitted with acute hypoxic respiratory failure. Per MD, plan to change oral ABX today after tightness in chest and tachycardia overnight upon finishing IV Levaquin; 2L/Min 02 NC. Pt not medically stable for discharge.     Will require 6-minute walk-test for DME of 02.    DC/POC to return home with family when medically stable.    CM team will continue to follow for potential discharge needs that may arise.

## 2024-10-07 NOTE — PLAN OF CARE
Problem: Chronic Conditions and Co-morbidities  Goal: Patient's chronic conditions and co-morbidity symptoms are monitored and maintained or improved  Outcome: Progressing     Problem: Discharge Planning  Goal: Discharge to home or other facility with appropriate resources  Outcome: Progressing     Pt still with BOWENS; using oxygen prn sob after instructing to keep in place for symptoms. Tele ordered x 24 more hours per MD.

## 2024-10-08 VITALS
TEMPERATURE: 97.9 F | SYSTOLIC BLOOD PRESSURE: 117 MMHG | DIASTOLIC BLOOD PRESSURE: 79 MMHG | HEART RATE: 96 BPM | HEIGHT: 68 IN | RESPIRATION RATE: 16 BRPM | BODY MASS INDEX: 30.31 KG/M2 | WEIGHT: 200 LBS | OXYGEN SATURATION: 92 %

## 2024-10-08 LAB
ANION GAP SERPL CALC-SCNC: 10 MMOL/L (ref 9–18)
BUN SERPL-MCNC: 7 MG/DL (ref 6–23)
CALCIUM SERPL-MCNC: 8.9 MG/DL (ref 8.8–10.2)
CHLORIDE SERPL-SCNC: 102 MMOL/L (ref 98–107)
CO2 SERPL-SCNC: 23 MMOL/L (ref 20–28)
CREAT SERPL-MCNC: 0.69 MG/DL (ref 0.6–1.1)
ERYTHROCYTE [DISTWIDTH] IN BLOOD BY AUTOMATED COUNT: 15.4 % (ref 11.9–14.6)
GLUCOSE SERPL-MCNC: 93 MG/DL (ref 70–99)
HCT VFR BLD AUTO: 33.6 % (ref 35.8–46.3)
HGB BLD-MCNC: 11.5 G/DL (ref 11.7–15.4)
MCH RBC QN AUTO: 30.8 PG (ref 26.1–32.9)
MCHC RBC AUTO-ENTMCNC: 34.2 G/DL (ref 31.4–35)
MCV RBC AUTO: 90.1 FL (ref 82–102)
NRBC # BLD: 0 K/UL (ref 0–0.2)
PLATELET # BLD AUTO: 533 K/UL (ref 150–450)
PMV BLD AUTO: 9.4 FL (ref 9.4–12.3)
POTASSIUM SERPL-SCNC: 4 MMOL/L (ref 3.5–5.1)
RBC # BLD AUTO: 3.73 M/UL (ref 4.05–5.2)
SODIUM SERPL-SCNC: 135 MMOL/L (ref 136–145)
WBC # BLD AUTO: 6.5 K/UL (ref 4.3–11.1)

## 2024-10-08 PROCEDURE — 94760 N-INVAS EAR/PLS OXIMETRY 1: CPT

## 2024-10-08 PROCEDURE — 94761 N-INVAS EAR/PLS OXIMETRY MLT: CPT

## 2024-10-08 PROCEDURE — 6370000000 HC RX 637 (ALT 250 FOR IP)

## 2024-10-08 PROCEDURE — 2580000003 HC RX 258

## 2024-10-08 PROCEDURE — 6360000002 HC RX W HCPCS

## 2024-10-08 PROCEDURE — 85027 COMPLETE CBC AUTOMATED: CPT

## 2024-10-08 PROCEDURE — 2700000000 HC OXYGEN THERAPY PER DAY

## 2024-10-08 PROCEDURE — 36415 COLL VENOUS BLD VENIPUNCTURE: CPT

## 2024-10-08 PROCEDURE — 80048 BASIC METABOLIC PNL TOTAL CA: CPT

## 2024-10-08 PROCEDURE — 94640 AIRWAY INHALATION TREATMENT: CPT

## 2024-10-08 RX ORDER — ALBUTEROL SULFATE 0.83 MG/ML
2.5 SOLUTION RESPIRATORY (INHALATION)
Qty: 120 EACH | Refills: 3 | Status: SHIPPED | OUTPATIENT
Start: 2024-10-08

## 2024-10-08 RX ORDER — CEFDINIR 300 MG/1
300 CAPSULE ORAL 2 TIMES DAILY
Qty: 20 CAPSULE | Refills: 0 | Status: SHIPPED | OUTPATIENT
Start: 2024-10-08 | End: 2024-10-18

## 2024-10-08 RX ADMIN — SERTRALINE 50 MG: 50 TABLET, FILM COATED ORAL at 09:19

## 2024-10-08 RX ADMIN — CEFDINIR 300 MG: 300 CAPSULE ORAL at 09:19

## 2024-10-08 RX ADMIN — SODIUM CHLORIDE, PRESERVATIVE FREE 10 ML: 5 INJECTION INTRAVENOUS at 09:20

## 2024-10-08 RX ADMIN — ALBUTEROL SULFATE 2.5 MG: 2.5 SOLUTION RESPIRATORY (INHALATION) at 08:06

## 2024-10-08 RX ADMIN — ALBUTEROL SULFATE 2.5 MG: 2.5 SOLUTION RESPIRATORY (INHALATION) at 12:02

## 2024-10-08 NOTE — PLAN OF CARE
Problem: Chronic Conditions and Co-morbidities  Goal: Patient's chronic conditions and co-morbidity symptoms are monitored and maintained or improved  10/8/2024 0228 by Abel Newton RN  Outcome: Progressing     Problem: Discharge Planning  Goal: Discharge to home or other facility with appropriate resources  10/8/2024 0228 by Abel Newton RN  Outcome: Progressing     Problem: Safety - Adult  Goal: Free from fall injury  Outcome: Progressing     Problem: Pain  Goal: Verbalizes/displays adequate comfort level or baseline comfort level  Outcome: Progressing

## 2024-10-08 NOTE — DISCHARGE SUMMARY
by PCR NOT DETECTED       Culture, Blood 1 [[id number]] Collected: 10/04/24 1102    Order Status: Completed Specimen: Blood Updated: 10/08/24 0728     Special Requests --        RIGHT  Antecubital       Culture NO GROWTH 4 DAYS       COVID-19, Rapid [[id number]]     Order Status: Canceled Specimen: Nasopharyngeal Swab     Culture, Urine [[id number]] Collected: 10/01/24 1607    Order Status: Completed Specimen: Urine Updated: 10/05/24 0603     Special Requests NO SPECIAL REQUESTS        Culture       10,000 to 50,000 COLONIES/mL MIXED SKIN KARRI ISOLATED                  >2 organisms - likely contaminated specimen.          COVID-19, Rapid [[id number]] Collected: 09/25/24 0415    Order Status: Completed Specimen: Nasopharyngeal Updated: 09/25/24 0509     Source Nasopharyngeal        SARS-CoV-2, Rapid Not detected        Comment:    The specimen is NEGATIVE for SARS-CoV-2, the novel coronavirus associated with COVID-19.  A negative result does not rule out COVID-19.     This test has been authorized by the FDA under an Emergency Use Authorization (EUA) for use by authorized laboratories.      Fact sheet for Healthcare Providers: https://www.fda.gov/media/936452/download  Fact sheet for Patients: https://www.fda.gov/media/548965/download     Methodology: Isothermal Nucleic Acid Amplification                 All Labs from Last 24 Hrs:  Recent Results (from the past 24 hour(s))   Basic Metabolic Panel w/ Reflex to MG    Collection Time: 10/08/24  6:12 AM   Result Value Ref Range    Sodium 135 (L) 136 - 145 mmol/L    Potassium 4.0 3.5 - 5.1 mmol/L    Chloride 102 98 - 107 mmol/L    CO2 23 20 - 28 mmol/L    Anion Gap 10 9 - 18 mmol/L    Glucose 93 70 - 99 mg/dL    BUN 7 6 - 23 MG/DL    Creatinine 0.69 0.60 - 1.10 MG/DL    Est, Glom Filt Rate >90 >60 ml/min/1.73m2    Calcium 8.9 8.8 - 10.2 MG/DL   CBC    Collection Time: 10/08/24  6:12 AM   Result Value Ref Range    WBC 6.5 4.3 - 11.1 K/uL    RBC 3.73 (L) 4.05 - 5.2 M/uL

## 2024-10-08 NOTE — CARE COORDINATION
Pt is for discharge home today with family and no needs/supportive care orders received for MSW at this time.       10/08/24 1205   Service Assessment   Patient's Healthcare Decision Maker is: Legal Next of Kin   Social/Functional History   Lives With Spouse   Type of Home House   ADL Assistance Independent   Homemaking Assistance Independent   Homemaking Responsibilities Yes   Ambulation Assistance Independent   Active  Yes   Mode of Transportation Car   Occupation Full time employment   Services At/After Discharge   Transition of Care Consult (CM Consult) Discharge Planning   Services At/After Discharge None   [place] Resource Information Provided? No   Mode of Transport at Discharge Other (see comment)  (family)   Confirm Follow Up Transport Self   Condition of Participation: Discharge Planning   The Plan for Transition of Care is related to the following treatment goals: Patient to return home with family with no needs.   The Patient and/or Patient Representative was provided with a Choice of Provider? Patient   The Patient and/Or Patient Representative agree with the Discharge Plan? Yes   Freedom of Choice list was provided with basic dialogue that supports the patient's individualized plan of care/goals, treatment preferences, and shares the quality data associated with the providers?  Yes     CM Nice, LBSW    [facility]

## 2024-10-08 NOTE — PLAN OF CARE
Problem: Chronic Conditions and Co-morbidities  Goal: Patient's chronic conditions and co-morbidity symptoms are monitored and maintained or improved  10/8/2024 1240 by Zay Porter RN  Outcome: Adequate for Discharge  10/8/2024 0228 by Abel Newton RN  Outcome: Progressing     Problem: Discharge Planning  Goal: Discharge to home or other facility with appropriate resources  10/8/2024 1240 by Zay Porter RN  Outcome: Adequate for Discharge  10/8/2024 0228 by Abel Newton RN  Outcome: Progressing     Problem: Safety - Adult  Goal: Free from fall injury  10/8/2024 1240 by Zay Porter RN  Outcome: Adequate for Discharge  10/8/2024 0228 by Abel Newton RN  Outcome: Progressing     Problem: Pain  Goal: Verbalizes/displays adequate comfort level or baseline comfort level  10/8/2024 1240 by Zay Porter RN  Outcome: Adequate for Discharge  10/8/2024 0228 by Abel Newton RN  Outcome: Progressing

## 2024-10-08 NOTE — PROGRESS NOTES
Hospitalist Progress Note   Admit Date:  10/4/2024 10:38 AM   Name:  Ileana Serrano   Age:  34 y.o.  Sex:  female  :  1990   MRN:  [id number]   Room:  360/01    Presenting/Chief Complaint: Fever     Reason(s) for Admission: Hypoxemia [R09.02]  Miscarriage [O03.9]  Pneumonia of both lungs due to infectious organism, unspecified part of lung [J18.9]  Acute hypoxic respiratory failure [J96.01]     Hospital Course:   Ileana Serrano is a 34 y.o. female with medical history of recent miscarriage with retained products of conception who presents today complaining of cough, fevers, shortness of breath and sore throat.  Patient's symptoms started on .  Has been seen in the outpatient clinic by PCP which was initially diagnosed as viral URI.  Patient was later started on clindamycin due to refractory symptoms.  Patient has been evaluated on 10/1 by OB/GYN due to spontaneous .  Has known retained products of conception.  Plan was to recheck ultrasound approximately 1 week.  Currently planning for conservative management.     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  Has 3 children at home.  Denies any known sick contacts but states is not uncommon to be exposed to sick individuals at any point.  Denies any rashes, arthralgias, or conjunctivitis.  Denies any stridor or difficulty swallowing fluids.  Denies any excessive drooling.  No history of immunodeficiencies or frequent hospitalizations for community-acquired pneumonia.      Subjective & 24hr Events:   Reports chest tightness and tachycardia after finishing IV Levaquin last night.  Improved with as needed neb.      Assessment & Plan:     Acute hypoxic respiratory failure  Bilateral chest infiltrates  CAP  Multifocal pneumonia.  Patient's refractory symptoms raises concern for superimposed bacterial infection and will treat as such.  Lateral neck xray negative for soft tissue swelling.  Negative MRSA nares.  -Will stop IV Levaquin 
   10/06/24 1940   Oxygen Therapy/Pulse Ox   O2 Device None (Room air)   Pulse 94   SpO2 93 %     Patient's sat while resting was 93% on room air before ambulation.  While ambulating patient required no supplemental oxygen to maintain sat above protocol.   Patient denied SOB, and no distress noted while ambulating.     Medicare has specific guidelines for documentation of ambulatory oxygen saturation testing, therefore all test results must be documented in the patient's EMR as outlined below.     Room air: SpO2 with O2 and liter flow   Resting SpO2  93%  N/A   Ambulating SpO2  96% RESULTS: N/A    While ambulating patient required no supplemental oxygen to maintain sat above protocol.              Respiratory Care Services     Policy Number: [id number]    Title: Home Oxygen Assessment Protocol    Effective Date:  4/9/14    Reviewed Date: 5/28/2018, 11/2020    Revised: 07/2019       Policy: The Respiratory Care Practitioner (RCP) shall assess all patients who meet Medicare's Home Oxygen Diagnostic Requirements. If a patient is unable to wean to room air within 48 hours of discharge, the RCP shall order a 3 step ambulatory oxygen saturation (SpO2) per protocol and instruct the patient in completing the test. The RCP shall document results of the test as outlined in this protocol.    Purpose: Oxygen is used for short-term hospitalized patients and long-term therapy in patients with chronic lung disease and recurring congestive heart failure. Centers for Medicare Services (CMS) has very specific guidelines and indications for its short-term use in the acute care setting and the long-term use in the home or other facility. This protocol will address the rationale and requirements for long-term oxygen therapy. Long-term oxygen therapy is delivered to reduce long-term complications of chronic hypoxemia, particularly cor pulmonale. Oxygen supplementation in patients with chronic hypoxemia has been shown to improve survival, 
  Called to patient's room - Patient c/o chest tightness with pain on inspiration.   Albuterol tx given - Patient stated breathing better post treatment.     Patient's heart rate before tx was 75 bpm.  Patient's heart rate at end of tx was 86 bpm.     After this Respiratory Therapist left patient's room the Monitor Room called RN informing the patient was in SVT with heart rate averaging 138 bpm.     This RT and patient's RN assessed patient who had just gotten up to use the bathroom.   Patient stated she felt bad when she had gotten up and now feels better in bed laying down.     RT and RN spent time with patient until her heart rate decreased to approximately 112 bpm.   - Time spent with patient was approximately 20 minutes.     Will continue to monitor patient closely.        10/07/24 0038   Treatment   Medications Albuterol   Pre-Tx Pulse 75   Breath Sounds Pre-Tx TESS Clear;Diminished   Breath Sounds Pre-Tx LLL End expiratory wheezes   Breath Sounds Pre-Tx RUL Clear;Diminished   Breath Sounds Pre-Tx RML Clear;Diminished   Breath Sounds Pre-Tx RLL End expiratory wheezes   Breath Sounds Post-Tx TESS Clear   Breath Sounds Post-Tx LLL End expiratory wheezes   Breath Sounds Post-Tx RUL Clear   Breath Sounds Post-Tx RML Clear   Breath Sounds Post-Tx RLL End expiratory wheezes   Post-Tx Pulse 86   Oxygen Therapy/Pulse Ox   O2 Device Nasal cannula  (2L)   O2 Flow Rate (L/min) 2 L/min       
  [facility]  [address] [place] [address]  Phone: [phone]             October 8, 2024    Patient: Ileana Serrano   YOB: 1990   Date of Visit: 10/4/2024       To Whom It May Concern:    Ileana Serrano was seen and treated in our facility  beginning 10/4/2024 until 10/8/2024. She may return to work on 10/14/2024 .      Sincerely,       EDWARD WOODRUFF RN         Signature:__________________________________       
Discharge instructions reviewed with Pt. Opportunity for questions and clarification given. IV removed and cath tip intact. Scripts sent to pharmacy. Education given to pt and pt was able to teach back. No needs at this time and pt waiting on ride to arrive.    
Patient states she feels short of breath and tightness to outer chest on each side. RN obtained BP and o2 on RA. O2 = 94%.  RN placed patient on 1L NC, administered Tylenol for pain and call RT for breathing treatment and their assessment of patient.  Will continue to monitor.    RT to room and administered a breathing tx.  Per monitor room tech patient had a burst of SVT reaching 151 and sustaining in the 140's.  RN and RT back to patient's room, she states she was up to the bathroom, now she is back in bed and states she feels \"jittery\".  Heart rate on portable monitor is sustaining 120s-130s.  RT states heart rate can increase after the breathing treatment.  RN continued to assess patient, heart rate continued to decrease and is now 91.  Will continue to monitor  
TRANSFER - IN REPORT:    Verbal report received from CINTIA Pretty at the bedside on Ileana Serrano  being received from ED for routine progression of patient care      Report consisted of patient's Situation, Background, Assessment and   Recommendations(SBAR).     Information from the following report(s) Nurse Handoff Report, MAR, and Recent Results was reviewed with the receiving nurse.    Opportunity for questions and clarification was provided.      Assessment completed upon patient's arrival to unit and care assumed.    
  10/04/24 1313 -- 92 21 -- 93 %   10/04/24 1300 -- 98 21 125/80 90 %       Oxygen Therapy  SpO2: 90 %  Pulse via Oximetry: 98 beats per minute  Pulse Oximeter Device Mode: Intermittent  Pulse Oximeter Device Location: Right, Finger  O2 Device: Nasal cannula  O2 Flow Rate (L/min): 4 L/min    Estimated body mass index is 30.41 kg/m² as calculated from the following:    Height as of this encounter: 1.727 m (5' 8\").    Weight as of this encounter: 90.7 kg (200 lb).  No intake or output data in the 24 hours ending 10/05/24 1132      Physical Exam:     General:    Well nourished.    Head:  Normocephalic, atraumatic  Eyes:  Sclerae appear normal.  Pupils equally round.  ENT:  Nares appear normal.  Moist oral mucosa  Neck:  No restricted ROM.  Trachea midline   CV:   Tachycardic   Lungs:   Rales that improve with coughing  Abdomen:   Soft, nontender, nondistended.  Extremities: No cyanosis or clubbing.  No edema  Skin:     No rashes.  Normal coloration.   Warm and dry.    Neuro:  CN II-XII grossly intact.    Psych:  Normal mood and affect.      I have personally reviewed labs and tests:  Recent Labs:  Recent Results (from the past 48 hour(s))   Culture, Blood 1    Collection Time: 10/04/24 11:02 AM    Specimen: Blood   Result Value Ref Range    Special Requests RIGHT  Antecubital        Culture NO GROWTH AFTER 22 HOURS     Culture, Blood 2    Collection Time: 10/04/24 11:14 AM    Specimen: Blood   Result Value Ref Range    Special Requests LEFT  Antecubital        Culture NO GROWTH AFTER 22 HOURS     Lactate, Sepsis    Collection Time: 10/04/24 11:14 AM   Result Value Ref Range    Lactic Acid, Sepsis 1.4 0.5 - 2.0 MMOL/L   CBC with Auto Differential    Collection Time: 10/04/24 11:14 AM   Result Value Ref Range    WBC 10.7 4.3 - 11.1 K/uL    RBC 3.49 (L) 4.05 - 5.2 M/uL    Hemoglobin 11.5 (L) 11.7 - 15.4 g/dL    Hematocrit 31.6 (L) 35.8 - 46.3 %    MCV 90.5 82.0 - 102.0 FL    MCH 33.0 (H) 26.1 - 32.9 PG    MCHC 36.4 (H) 
Route Frequency    ketorolac (TORADOL) injection 30 mg  30 mg IntraVENous Q6H PRN    albuterol (PROVENTIL) (2.5 MG/3ML) 0.083% nebulizer solution 2.5 mg  2.5 mg Nebulization Q6H WA RT    sertraline (ZOLOFT) tablet 50 mg  50 mg Oral Daily    levoFLOXacin (LEVAQUIN) 500 MG/100ML infusion 500 mg  500 mg IntraVENous Q24H    And    levoFLOXacin (LEVAQUIN) 250 MG/50ML infusion 250 mg  250 mg IntraVENous Q24H    sodium chloride flush 0.9 % injection 5-40 mL  5-40 mL IntraVENous 2 times per day    sodium chloride flush 0.9 % injection 5-40 mL  5-40 mL IntraVENous PRN    0.9 % sodium chloride infusion   IntraVENous PRN    potassium chloride (KLOR-CON M) extended release tablet 40 mEq  40 mEq Oral PRN    Or    potassium bicarb-citric acid (EFFER-K) effervescent tablet 40 mEq  40 mEq Oral PRN    Or    potassium chloride 10 mEq/100 mL IVPB (Peripheral Line)  10 mEq IntraVENous PRN    magnesium sulfate 2000 mg in 50 mL IVPB premix  2,000 mg IntraVENous PRN    enoxaparin (LOVENOX) injection 40 mg  40 mg SubCUTAneous Q24H    ondansetron (ZOFRAN-ODT) disintegrating tablet 4 mg  4 mg Oral Q8H PRN    Or    ondansetron (ZOFRAN) injection 4 mg  4 mg IntraVENous Q6H PRN    polyethylene glycol (GLYCOLAX) packet 17 g  17 g Oral Daily PRN    guaiFENesin-dextromethorphan (ROBITUSSIN DM) 100-10 MG/5ML syrup 5 mL  5 mL Oral Q4H PRN    acetaminophen (TYLENOL) tablet 650 mg  650 mg Oral Q6H PRN    Or    acetaminophen (TYLENOL) suppository 650 mg  650 mg Rectal Q6H PRN       Signed:  Brandon Mcgarry MD    Part of this note may have been written by using a voice dictation software.  The note has been proof read but may still contain some grammatical/other typographical errors.    
necessary   Document findings in patient EMR.    If SaO2 is greater than 88%, proceed to the next step.  Ambulatory Room Air SaO2 check  Ask patient to walk for 5 minutes on room air or as long as tolerated.  If patients ordinarily use a cane, walker or rollator, they should be used during test.  Instruct patient to walk at a comfortable pace and to breathe naturally during test.  Monitor and record patient's heart rate, SaO2 and exercise endurance.  If SaO2 is 88% or less post exercise, an ambulatory test on oxygen must be performed.  Ambulatory SaO2 on Oxygen  [facility] shall place the patient on oxygen to achieve a SaO2 of 90%.  Instruct patient to walk for 5 minutes or as long as tolerated.  If patients ordinarily use a cane, walker or rollator, they should be used during test  Monitor and record patient's heart rate, SaO2 and exercise endurance.  If Sa02 decreases to the range of 80% to 84% the flow shall be increased by 2LPM.   If SaO2 decreases to the range of 85%-89% increased oxygen by 1 LPM.  If SaO2 is less than 80%, the patient is not appropriate for ambulation.    Documentation   Medicare has specific guidelines for documentation of ambulatory oxygen saturation testing, therefore all test results must be documented in the patient's EMR as outlined below.     Room air: SpO2 with O2 and liter flow   Resting SpO2 91%    Ambulating SpO2 93% RESULTS: 91% on unassisted RA post ambulation.      Reference:       CMS Center for Medicare & Medicaid Services. Home Oxygen Therapy. Medicare        Part B- Oxygen Coverage

## 2024-10-09 LAB
BACTERIA SPEC CULT: NORMAL
BACTERIA SPEC CULT: NORMAL
L PNEUMO1 AG UR QL IA: NEGATIVE
SERVICE CMNT-IMP: NORMAL
SERVICE CMNT-IMP: NORMAL
SPECIMEN SOURCE: NORMAL

## 2024-10-09 NOTE — PROGRESS NOTES
The patient is a 34 y.o.  who is seen for TAB ultrasound.   S/p hospitalization for pneumonia. She notes her sx initially improved but she does feel very fatigued and unwell.  She is watching her sats at home and they are above 90 overall. She is doing breathing tx and nebulizer she was given abx in hospital.     She began bleeding  and bleeding continued into her hospitalization with heavy flow. She was seen for TAB visit on 10/1 and likely RPOC was seen however pt was actively bleeding so recheck was planned for 1wk which represents todays scan.     She notes her bleeding today is only while wiping and is light pink.     LMP: 2024  ASHLEY based off of LMP: 2025   GA today: 10w0d    HCG results:  24 (102) Progesterone (3.93)  24 (165)  10/4/24 (531)     Ultrasound Findings from 10/01/2024:  EOB ultrasound for TAB   Patient started bleeding on Thursday and is still bleeding. Has been running a fever for over a week.   LMP was 24   Enlarged uterus = 5wks   Endo = 11mm and appears thick and inhomogeneous with feeder vessels noted. NO gest sac seen on today's   ultrasound. Question RPOC.   Rt ovary appears normal   Lt ovary appears normal   No free fluid seen.     Ultrasound findings from today:  UT- anteverted and heterogeneous   ENDO- 12 mm, mobile debris, with vascularity, hypoechoic area with echogenic center 1.5 x 0.8 x 1.8 cm with no definite   blood flow, isoechoic area with blood flow measuring 0.8 x 0.6 x 0.8 cm, cannot r/o RPOC   Rov- c/w pocs   Lov- appears WNL   Bilateral adnexas appear WNL   No free fluid present     HISTORY:    Patient's last menstrual period was 2024 (exact date).    Current Outpatient Medications on File Prior to Visit   Medication Sig Dispense Refill    albuterol (PROVENTIL) (2.5 MG/3ML) 0.083% nebulizer solution Take 3 mLs by nebulization 4 times daily 120 each 3    sertraline (ZOLOFT) 50 MG tablet Take 1 tablet by mouth daily 30 tablet 3 
****.  Greater than 50% of the *** minute visit were spent in counseling to the above topics.

## 2024-10-10 ENCOUNTER — OFFICE VISIT (OUTPATIENT)
Dept: OBGYN CLINIC | Age: 34
End: 2024-10-10
Payer: COMMERCIAL

## 2024-10-10 ENCOUNTER — PROCEDURE VISIT (OUTPATIENT)
Dept: OBGYN CLINIC | Age: 34
End: 2024-10-10
Payer: COMMERCIAL

## 2024-10-10 VITALS
DIASTOLIC BLOOD PRESSURE: 60 MMHG | WEIGHT: 198.7 LBS | BODY MASS INDEX: 30.11 KG/M2 | HEIGHT: 68 IN | SYSTOLIC BLOOD PRESSURE: 90 MMHG | HEART RATE: 104 BPM | OXYGEN SATURATION: 89 %

## 2024-10-10 DIAGNOSIS — O03.9 SAB (SPONTANEOUS ABORTION): Primary | ICD-10-CM

## 2024-10-10 DIAGNOSIS — O03.4 RETAINED PRODUCTS OF CONCEPTION AFTER MISCARRIAGE: Primary | ICD-10-CM

## 2024-10-10 DIAGNOSIS — J18.9 PNEUMONIA OF BOTH LUNGS DUE TO INFECTIOUS ORGANISM, UNSPECIFIED PART OF LUNG: ICD-10-CM

## 2024-10-10 PROCEDURE — 99214 OFFICE O/P EST MOD 30 MIN: CPT | Performed by: NURSE PRACTITIONER

## 2024-10-10 PROCEDURE — 76830 TRANSVAGINAL US NON-OB: CPT | Performed by: OBSTETRICS & GYNECOLOGY

## 2024-10-10 RX ORDER — MISOPROSTOL 200 UG/1
800 TABLET ORAL 2 TIMES DAILY
Qty: 8 TABLET | Refills: 0 | Status: SHIPPED | OUTPATIENT
Start: 2024-10-10 | End: 2024-10-11

## 2024-10-11 NOTE — ED NOTES
Attempt to contact patient with Case Management to inquire about follow up care post discharge. Pt answered the phone and hung up on me when I introduced myself.      Ileana Lopez, RN  10/11/24 [id number]

## 2024-10-14 SDOH — HEALTH STABILITY: PHYSICAL HEALTH: ON AVERAGE, HOW MANY MINUTES DO YOU ENGAGE IN EXERCISE AT THIS LEVEL?: 10 MIN

## 2024-10-14 SDOH — HEALTH STABILITY: PHYSICAL HEALTH: ON AVERAGE, HOW MANY DAYS PER WEEK DO YOU ENGAGE IN MODERATE TO STRENUOUS EXERCISE (LIKE A BRISK WALK)?: 3 DAYS

## 2024-10-15 ENCOUNTER — OFFICE VISIT (OUTPATIENT)
Dept: FAMILY MEDICINE CLINIC | Facility: CLINIC | Age: 34
End: 2024-10-15

## 2024-10-15 VITALS
BODY MASS INDEX: 30.01 KG/M2 | HEART RATE: 101 BPM | OXYGEN SATURATION: 95 % | TEMPERATURE: 98.1 F | HEIGHT: 68 IN | DIASTOLIC BLOOD PRESSURE: 70 MMHG | RESPIRATION RATE: 16 BRPM | SYSTOLIC BLOOD PRESSURE: 92 MMHG | WEIGHT: 198 LBS

## 2024-10-15 DIAGNOSIS — J18.9 PNEUMONIA DUE TO INFECTIOUS ORGANISM, UNSPECIFIED LATERALITY, UNSPECIFIED PART OF LUNG: ICD-10-CM

## 2024-10-15 DIAGNOSIS — E55.9 VITAMIN D DEFICIENCY: ICD-10-CM

## 2024-10-15 DIAGNOSIS — O03.9 SAB (SPONTANEOUS ABORTION): ICD-10-CM

## 2024-10-15 DIAGNOSIS — Z09 HOSPITAL DISCHARGE FOLLOW-UP: Primary | ICD-10-CM

## 2024-10-15 LAB
25(OH)D3 SERPL-MCNC: 35.3 NG/ML (ref 30–100)
ALBUMIN SERPL-MCNC: 3.9 G/DL (ref 3.5–5)
ALBUMIN/GLOB SERPL: 1 (ref 1–1.9)
ALP SERPL-CCNC: 120 U/L (ref 35–104)
ALT SERPL-CCNC: 18 U/L (ref 8–45)
ANION GAP SERPL CALC-SCNC: 14 MMOL/L (ref 9–18)
AST SERPL-CCNC: 15 U/L (ref 15–37)
BASOPHILS # BLD: 0 K/UL (ref 0–0.2)
BASOPHILS NFR BLD: 0 % (ref 0–2)
BILIRUB SERPL-MCNC: 0.6 MG/DL (ref 0–1.2)
BUN SERPL-MCNC: 11 MG/DL (ref 6–23)
CALCIUM SERPL-MCNC: 9.6 MG/DL (ref 8.8–10.2)
CHLORIDE SERPL-SCNC: 101 MMOL/L (ref 98–107)
CO2 SERPL-SCNC: 24 MMOL/L (ref 20–28)
CREAT SERPL-MCNC: 0.74 MG/DL (ref 0.6–1.1)
DIFFERENTIAL METHOD BLD: ABNORMAL
EOSINOPHIL # BLD: 0.3 K/UL (ref 0–0.8)
EOSINOPHIL NFR BLD: 2 % (ref 0.5–7.8)
ERYTHROCYTE [DISTWIDTH] IN BLOOD BY AUTOMATED COUNT: 15 % (ref 11.9–14.6)
GLOBULIN SER CALC-MCNC: 4 G/DL (ref 2.3–3.5)
GLUCOSE SERPL-MCNC: 81 MG/DL (ref 70–99)
HCT VFR BLD AUTO: 40.9 % (ref 35.8–46.3)
HGB BLD-MCNC: 13.9 G/DL (ref 11.7–15.4)
IMM GRANULOCYTES # BLD AUTO: 0.1 K/UL (ref 0–0.5)
IMM GRANULOCYTES NFR BLD AUTO: 1 % (ref 0–5)
LYMPHOCYTES # BLD: 3.3 K/UL (ref 0.5–4.6)
LYMPHOCYTES NFR BLD: 27 % (ref 13–44)
MCH RBC QN AUTO: 31.1 PG (ref 26.1–32.9)
MCHC RBC AUTO-ENTMCNC: 34 G/DL (ref 31.4–35)
MCV RBC AUTO: 91.5 FL (ref 82–102)
MONOCYTES # BLD: 0.9 K/UL (ref 0.1–1.3)
MONOCYTES NFR BLD: 7 % (ref 4–12)
NEUTS SEG # BLD: 7.6 K/UL (ref 1.7–8.2)
NEUTS SEG NFR BLD: 63 % (ref 43–78)
NRBC # BLD: 0 K/UL (ref 0–0.2)
PLATELET # BLD AUTO: 460 K/UL (ref 150–450)
PMV BLD AUTO: 10.1 FL (ref 9.4–12.3)
POTASSIUM SERPL-SCNC: 4.5 MMOL/L (ref 3.5–5.1)
PROT SERPL-MCNC: 7.9 G/DL (ref 6.3–8.2)
RBC # BLD AUTO: 4.47 M/UL (ref 4.05–5.2)
SODIUM SERPL-SCNC: 139 MMOL/L (ref 136–145)
WBC # BLD AUTO: 12.1 K/UL (ref 4.3–11.1)

## 2024-10-15 RX ORDER — ALBUTEROL SULFATE 90 UG/1
2 INHALANT RESPIRATORY (INHALATION) EVERY 4 HOURS PRN
Qty: 1 EACH | Refills: 11 | Status: SHIPPED | OUTPATIENT
Start: 2024-10-15

## 2024-10-15 RX ORDER — GUAIFENESIN 600 MG/1
600 TABLET, EXTENDED RELEASE ORAL 2 TIMES DAILY
COMMUNITY
Start: 2024-10-15 | End: 2024-10-30

## 2024-10-15 RX ORDER — TRIAMCINOLONE ACETONIDE 40 MG/ML
40 INJECTION, SUSPENSION INTRA-ARTICULAR; INTRAMUSCULAR ONCE
Status: COMPLETED | OUTPATIENT
Start: 2024-10-15 | End: 2024-10-15

## 2024-10-15 RX ORDER — ALBUTEROL SULFATE 0.83 MG/ML
2.5 SOLUTION RESPIRATORY (INHALATION) EVERY 6 HOURS PRN
Qty: 120 EACH | Refills: 3 | Status: SHIPPED | OUTPATIENT
Start: 2024-10-15

## 2024-10-15 RX ORDER — PREDNISONE 20 MG/1
TABLET ORAL
Qty: 11 TABLET | Refills: 0 | Status: SHIPPED | OUTPATIENT
Start: 2024-10-15

## 2024-10-15 RX ORDER — CEFDINIR 300 MG/1
300 CAPSULE ORAL 2 TIMES DAILY
Qty: 10 CAPSULE | Refills: 0 | Status: SHIPPED | OUTPATIENT
Start: 2024-10-15 | End: 2024-10-20

## 2024-10-15 RX ADMIN — TRIAMCINOLONE ACETONIDE 40 MG: 40 INJECTION, SUSPENSION INTRA-ARTICULAR; INTRAMUSCULAR at 15:09

## 2024-10-15 SDOH — ECONOMIC STABILITY: INCOME INSECURITY: HOW HARD IS IT FOR YOU TO PAY FOR THE VERY BASICS LIKE FOOD, HOUSING, MEDICAL CARE, AND HEATING?: NOT HARD AT ALL

## 2024-10-15 SDOH — ECONOMIC STABILITY: FOOD INSECURITY: WITHIN THE PAST 12 MONTHS, THE FOOD YOU BOUGHT JUST DIDN'T LAST AND YOU DIDN'T HAVE MONEY TO GET MORE.: NEVER TRUE

## 2024-10-15 SDOH — ECONOMIC STABILITY: FOOD INSECURITY: WITHIN THE PAST 12 MONTHS, YOU WORRIED THAT YOUR FOOD WOULD RUN OUT BEFORE YOU GOT MONEY TO BUY MORE.: NEVER TRUE

## 2024-10-15 ASSESSMENT — PATIENT HEALTH QUESTIONNAIRE - PHQ9
4. FEELING TIRED OR HAVING LITTLE ENERGY: NOT AT ALL
8. MOVING OR SPEAKING SO SLOWLY THAT OTHER PEOPLE COULD HAVE NOTICED. OR THE OPPOSITE, BEING SO FIGETY OR RESTLESS THAT YOU HAVE BEEN MOVING AROUND A LOT MORE THAN USUAL: NOT AT ALL
7. TROUBLE CONCENTRATING ON THINGS, SUCH AS READING THE NEWSPAPER OR WATCHING TELEVISION: NOT AT ALL
3. TROUBLE FALLING OR STAYING ASLEEP: NOT AT ALL
SUM OF ALL RESPONSES TO PHQ QUESTIONS 1-9: 0
SUM OF ALL RESPONSES TO PHQ9 QUESTIONS 1 & 2: 0
10. IF YOU CHECKED OFF ANY PROBLEMS, HOW DIFFICULT HAVE THESE PROBLEMS MADE IT FOR YOU TO DO YOUR WORK, TAKE CARE OF THINGS AT HOME, OR GET ALONG WITH OTHER PEOPLE: NOT DIFFICULT AT ALL
6. FEELING BAD ABOUT YOURSELF - OR THAT YOU ARE A FAILURE OR HAVE LET YOURSELF OR YOUR FAMILY DOWN: NOT AT ALL
9. THOUGHTS THAT YOU WOULD BE BETTER OFF DEAD, OR OF HURTING YOURSELF: NOT AT ALL
SUM OF ALL RESPONSES TO PHQ QUESTIONS 1-9: 0
1. LITTLE INTEREST OR PLEASURE IN DOING THINGS: NOT AT ALL
5. POOR APPETITE OR OVEREATING: NOT AT ALL
SUM OF ALL RESPONSES TO PHQ QUESTIONS 1-9: 0
SUM OF ALL RESPONSES TO PHQ QUESTIONS 1-9: 0
2. FEELING DOWN, DEPRESSED OR HOPELESS: NOT AT ALL

## 2024-10-15 NOTE — PROGRESS NOTES
PROGRESS NOTE    Chief Complaint   Patient presents with    New Patient    Follow-Up from Hospital     Pneumonia        SUBJECTIVE:     Ileana Serrano is a very sweet and pleasant 34 y.o. female seen today in office accompanied by her  as a new patient to establish care.  She unfortunately had a rough go round started the end of September where she started developing a fever of 103.  She had went to urgent care but tested negative for flu and COVID as well as urinalysis.  It was thought to be viral of etiology and was sent home.  She then went to the ED on 9/25 for fever.  She had a negative chest x-ray, no significant leukocytosis, negative flu and COVID and was given IV fluids along with Tylenol.  At that time she had a positive pregnancy test but unfortunately, in the process of having a miscarriage.  She started bleeding vaginally on 10/1.  She was seen by her GYN and confirmed the miscarriage.  Her GYN did indeed give her clindamycin to attempt to treat her infection.  Unfortunately, she still continue to have a fever of 103 for 14 days total as reported.  She then went to the ED on 10/4 where she was noted to be hypoxic with oxygenation in the upper 80s that drops to the low 80s when she ambulates.  She was placed on O2 oxygenation and was started on broad-spectrum antibiotics.  Chest x-ray at that time showed bilateral pneumonia with left worse than right.  Urinalysis was continue to be negative.  She continues to have heavy bleeding and mild vaginal spotting afterwards.  GYN ultrasound show small area of retained products of conception.   During the course of treatment, she was given a course of Levaquin but unfortunately, she had an allergic reaction.  She did receive a course of prednisone for treatment for allergic reaction and that did resolve.  She was then switched to cefdinir.  She had a chest CT that showed no abnormal masses or nodules but did confirm bilateral pneumonia with left

## 2024-10-17 ASSESSMENT — ENCOUNTER SYMPTOMS
BACK PAIN: 0
VOICE CHANGE: 0
ABDOMINAL DISTENTION: 0
PHOTOPHOBIA: 0
STRIDOR: 0
DIARRHEA: 0
SINUS PRESSURE: 0
ALLERGIC/IMMUNOLOGIC NEGATIVE: 1
EYE DISCHARGE: 0
NAUSEA: 0
COUGH: 1
SINUS PAIN: 0
CHOKING: 0
WHEEZING: 1
EYE REDNESS: 0
ABDOMINAL PAIN: 0
EYE ITCHING: 0
RECTAL PAIN: 0
VOMITING: 0
RHINORRHEA: 0
COLOR CHANGE: 0
GASTROINTESTINAL NEGATIVE: 1
SORE THROAT: 0
CONSTIPATION: 0
SHORTNESS OF BREATH: 1
ANAL BLEEDING: 0
EYES NEGATIVE: 1
CHEST TIGHTNESS: 1
BLOOD IN STOOL: 0
APNEA: 0
EYE PAIN: 0
TROUBLE SWALLOWING: 0
FACIAL SWELLING: 0

## 2024-10-21 ENCOUNTER — OFFICE VISIT (OUTPATIENT)
Dept: OBGYN CLINIC | Age: 34
End: 2024-10-21
Payer: COMMERCIAL

## 2024-10-21 ENCOUNTER — PROCEDURE VISIT (OUTPATIENT)
Dept: OBGYN CLINIC | Age: 34
End: 2024-10-21
Payer: COMMERCIAL

## 2024-10-21 VITALS
HEIGHT: 68 IN | WEIGHT: 203.9 LBS | BODY MASS INDEX: 30.9 KG/M2 | SYSTOLIC BLOOD PRESSURE: 112 MMHG | DIASTOLIC BLOOD PRESSURE: 68 MMHG

## 2024-10-21 DIAGNOSIS — O03.9 SPONTANEOUS ABORTION: Primary | ICD-10-CM

## 2024-10-21 DIAGNOSIS — O03.4 RETAINED PRODUCTS OF CONCEPTION AFTER MISCARRIAGE: Primary | ICD-10-CM

## 2024-10-21 DIAGNOSIS — N93.8 DUB (DYSFUNCTIONAL UTERINE BLEEDING): ICD-10-CM

## 2024-10-21 PROCEDURE — 99214 OFFICE O/P EST MOD 30 MIN: CPT | Performed by: OBSTETRICS & GYNECOLOGY

## 2024-10-21 PROCEDURE — 76830 TRANSVAGINAL US NON-OB: CPT | Performed by: OBSTETRICS & GYNECOLOGY

## 2024-10-21 NOTE — PROGRESS NOTES
The patient is a 34 y.o.  who is seen for FU US. Pt has taken two rounds of cytotec (10/11/24 and 10/14/24) and reported small amount of bleeding and clots with both rounds of cyctotec. Pt reports she only noted bleeding when wiping.    Pt was hospitalized for pneumonia beginning of Sept. Pt seen by Tahira Hinton CNP 10/10/24. Pt chest xray from 10/15/24 showed improvement in pneumonia and pt's last WBC 12.1 (previously 6.5 on 10/8/24).    Pt reports after cytotec she experienced a small amount of bleeding only when wiping and cramping. Pt here to discuss possible D&C if indicated.    US findings from today:  Transvag Sab f/u- f/u after cytotec   CX- appears WNL   UT- anteverted and heterogeneous   ENDO- 6.2 mm (prev 12 mm), small amount of mobile debris, echogenic area noted with no blood flow measuring 0.9 x 0.5   cm, possible blood clot vs other   Ovaries c/w pcos   ROV- 2 cysts noted today, #1- probable clc 2.5 x 2.9 x 3.1 cm and #2- hemorrhagic cyst with thin septation 2.1 x 2.5 x 2.8 cm   Bilateral adnexas appear WNL   No free fluid present     HCG results:  10/4/24 (531)  24 (165)  24 (102)    Progesterone (3.93)    Ultrasound findings from 10/10/24:  UT- anteverted and heterogeneous   ENDO- 12 mm, mobile debris, with vascularity, hypoechoic area with echogenic center 1.5 x 0.8 x 1.8 cm with no definite   blood flow, isoechoic area with blood flow measuring 0.8 x 0.6 x 0.8 cm, cannot r/o RPOC   Rov- c/w pocs   Lov- appears WNL   Bilateral adnexas appear WNL   No free fluid present     Ultrasound Findings from 10/01/2024:  EOB ultrasound for TAB   Patient started bleeding on Thursday and is still bleeding. Has been running a fever for over a week.   LMP was 24   Enlarged uterus = 5wks   Endo = 11mm and appears thick and inhomogeneous with feeder vessels noted. NO gest sac seen on today's   ultrasound. Question RPOC.   Rt ovary appears normal   Lt ovary appears normal   No free fluid

## 2024-10-24 ENCOUNTER — OFFICE VISIT (OUTPATIENT)
Dept: FAMILY MEDICINE CLINIC | Facility: CLINIC | Age: 34
End: 2024-10-24
Payer: COMMERCIAL

## 2024-10-24 VITALS
DIASTOLIC BLOOD PRESSURE: 58 MMHG | RESPIRATION RATE: 16 BRPM | TEMPERATURE: 98.1 F | SYSTOLIC BLOOD PRESSURE: 108 MMHG | HEIGHT: 68 IN | BODY MASS INDEX: 30.68 KG/M2 | WEIGHT: 202.4 LBS | OXYGEN SATURATION: 94 % | HEART RATE: 90 BPM

## 2024-10-24 DIAGNOSIS — O03.9 SAB (SPONTANEOUS ABORTION): ICD-10-CM

## 2024-10-24 DIAGNOSIS — E55.9 VITAMIN D DEFICIENCY: ICD-10-CM

## 2024-10-24 DIAGNOSIS — J18.9 PNEUMONIA DUE TO INFECTIOUS ORGANISM, UNSPECIFIED LATERALITY, UNSPECIFIED PART OF LUNG: Primary | ICD-10-CM

## 2024-10-24 PROCEDURE — 99214 OFFICE O/P EST MOD 30 MIN: CPT | Performed by: NURSE PRACTITIONER

## 2024-10-24 RX ORDER — FLUTICASONE PROPIONATE AND SALMETEROL 250; 50 UG/1; UG/1
1 POWDER RESPIRATORY (INHALATION) EVERY 12 HOURS
Qty: 60 EACH | Refills: 3 | Status: SHIPPED | OUTPATIENT
Start: 2024-10-24

## 2024-10-24 RX ORDER — PREDNISONE 20 MG/1
TABLET ORAL
Qty: 11 TABLET | Refills: 0 | Status: SHIPPED | OUTPATIENT
Start: 2024-10-24

## 2024-10-24 ASSESSMENT — PATIENT HEALTH QUESTIONNAIRE - PHQ9
SUM OF ALL RESPONSES TO PHQ QUESTIONS 1-9: 0
2. FEELING DOWN, DEPRESSED OR HOPELESS: NOT AT ALL
SUM OF ALL RESPONSES TO PHQ9 QUESTIONS 1 & 2: 0
SUM OF ALL RESPONSES TO PHQ QUESTIONS 1-9: 0
SUM OF ALL RESPONSES TO PHQ QUESTIONS 1-9: 0
1. LITTLE INTEREST OR PLEASURE IN DOING THINGS: NOT AT ALL
SUM OF ALL RESPONSES TO PHQ QUESTIONS 1-9: 0

## 2024-10-24 NOTE — PROGRESS NOTES
PROGRESS NOTE    Chief Complaint   Patient presents with    Follow-up    Fatigue    Chest Discomfort        SUBJECTIVE:     Ileana Serrano is a very pleasant 34 y.o. female seen today in office accompanied by her  for follow-up.  Patient report feeling much better after starting prednisone and continuing with cefdinir.  However, as the prednisone starts tapering down, she started having some increased fatigue.  She reports she \"overdid\" with folding laundry and a few other light activities but felt very fatigued afterwards.  She rested the rest of the day and the following day.  She reports feeling somewhat discouraged due to the easily fatigue and easily winded sensation.  She is still using her nebulizer treatments 2 times a day and still using albuterol inhaler several times throughout the day.  She reports coughing up and producing sputum easier.  No further fever and cough is improving overall.  She reports no chest pain, no shortness of breath with sedentary position, no unilateral or focal weakness, no edema, no orthopnea, no PND.    She has been seen and evaluated by her GYN recently.  They are currently waiting on the D&C.  She continues to have some bleeding from Cytotec.  She reports that they are waiting until her bleeding is stopped and after her next menstrual, she will return for a repeat ultrasound.    Past Medical History, Past Surgical History, Family history, Social History, and Medications were all reviewed with the patient today and updated as necessary.       Current Outpatient Medications   Medication Sig Dispense Refill    fluticasone-salmeterol (ADVAIR DISKUS) 250-50 MCG/ACT AEPB diskus inhaler Inhale 1 puff into the lungs in the morning and 1 puff in the evening. Rinse mouth after use. 60 each 3    predniSONE (DELTASONE) 20 MG tablet Take 2 tablets with food by mouth every morning for 4 days, then 1 tablet with food by mouth every morning for 3 days then stop. 11 tablet 0

## 2024-10-25 ENCOUNTER — TELEPHONE (OUTPATIENT)
Dept: FAMILY MEDICINE CLINIC | Facility: CLINIC | Age: 34
End: 2024-10-25

## 2024-10-25 PROBLEM — F43.22 ADJUSTMENT DISORDER WITH ANXIETY: Status: ACTIVE | Noted: 2023-04-20

## 2024-10-28 ASSESSMENT — ENCOUNTER SYMPTOMS
BACK PAIN: 0
PHOTOPHOBIA: 0
ABDOMINAL PAIN: 0
TROUBLE SWALLOWING: 0
ANAL BLEEDING: 0
EYES NEGATIVE: 1
ALLERGIC/IMMUNOLOGIC NEGATIVE: 1
EYE PAIN: 0
CHEST TIGHTNESS: 0
BLOOD IN STOOL: 0
APNEA: 0
SHORTNESS OF BREATH: 1
NAUSEA: 0
SORE THROAT: 0
EYE DISCHARGE: 0
VOMITING: 0
RECTAL PAIN: 0
CHOKING: 0
GASTROINTESTINAL NEGATIVE: 1
CONSTIPATION: 0
COUGH: 1
FACIAL SWELLING: 0
COLOR CHANGE: 0
DIARRHEA: 0
SINUS PRESSURE: 0
ABDOMINAL DISTENTION: 0
STRIDOR: 0
EYE ITCHING: 0
WHEEZING: 0
VOICE CHANGE: 0
RHINORRHEA: 0
SINUS PAIN: 0
EYE REDNESS: 0

## 2024-10-31 ENCOUNTER — OFFICE VISIT (OUTPATIENT)
Dept: FAMILY MEDICINE CLINIC | Facility: CLINIC | Age: 34
End: 2024-10-31
Payer: COMMERCIAL

## 2024-10-31 VITALS
WEIGHT: 205.6 LBS | TEMPERATURE: 98 F | HEART RATE: 89 BPM | BODY MASS INDEX: 31.16 KG/M2 | OXYGEN SATURATION: 97 % | HEIGHT: 68 IN | RESPIRATION RATE: 16 BRPM | DIASTOLIC BLOOD PRESSURE: 60 MMHG | SYSTOLIC BLOOD PRESSURE: 112 MMHG

## 2024-10-31 DIAGNOSIS — E55.9 VITAMIN D DEFICIENCY: ICD-10-CM

## 2024-10-31 DIAGNOSIS — J18.9 PNEUMONIA DUE TO INFECTIOUS ORGANISM, UNSPECIFIED LATERALITY, UNSPECIFIED PART OF LUNG: Primary | ICD-10-CM

## 2024-10-31 PROCEDURE — 99214 OFFICE O/P EST MOD 30 MIN: CPT | Performed by: NURSE PRACTITIONER

## 2024-10-31 RX ORDER — ALBUTEROL SULFATE 90 UG/1
2 INHALANT RESPIRATORY (INHALATION) EVERY 4 HOURS PRN
Qty: 2 EACH | Refills: 11 | Status: SHIPPED | OUTPATIENT
Start: 2024-10-31

## 2024-10-31 RX ORDER — BENZONATATE 100 MG/1
100 CAPSULE ORAL 3 TIMES DAILY PRN
Qty: 30 CAPSULE | Refills: 0 | Status: SHIPPED | OUTPATIENT
Start: 2024-10-31 | End: 2024-11-10

## 2024-10-31 ASSESSMENT — PATIENT HEALTH QUESTIONNAIRE - PHQ9
SUM OF ALL RESPONSES TO PHQ QUESTIONS 1-9: 0
1. LITTLE INTEREST OR PLEASURE IN DOING THINGS: NOT AT ALL
SUM OF ALL RESPONSES TO PHQ9 QUESTIONS 1 & 2: 0
2. FEELING DOWN, DEPRESSED OR HOPELESS: NOT AT ALL
SUM OF ALL RESPONSES TO PHQ QUESTIONS 1-9: 0

## 2024-10-31 NOTE — PROGRESS NOTES
PROGRESS NOTE    Chief Complaint   Patient presents with    Follow-up       SUBJECTIVE:     Ileana Serrano is a very sweet 34 y.o. female seen today in office accompanied by her  for follow up. She reports feeling better and is able to do more tasks without getting easily winded. Her endurance is increasing as well. She reports improvement with use of ICS/LABA inhaler additional therapy. She reports she is able to wean off of nebulizer completely and using her rescue as needed. She still has a couple more days of prednisone. She would like to try returning back to work on a reduced hours basis. She currently teaches at elementary school.       Past Medical History, Past Surgical History, Family history, Social History, and Medications were all reviewed with the patient today and updated as necessary.       Current Outpatient Medications   Medication Sig Dispense Refill    albuterol sulfate HFA (PROVENTIL HFA) 108 (90 Base) MCG/ACT inhaler Inhale 2 puffs into the lungs every 4 hours as needed for Wheezing or Shortness of Breath 2 each 11    benzonatate (TESSALON) 100 MG capsule Take 1 capsule by mouth 3 times daily as needed for Cough 30 capsule 0    fluticasone-salmeterol (ADVAIR DISKUS) 250-50 MCG/ACT AEPB diskus inhaler Inhale 1 puff into the lungs in the morning and 1 puff in the evening. Rinse mouth after use. 60 each 3    predniSONE (DELTASONE) 20 MG tablet Take 2 tablets with food by mouth every morning for 4 days, then 1 tablet with food by mouth every morning for 3 days then stop. 11 tablet 0    albuterol (PROVENTIL) (2.5 MG/3ML) 0.083% nebulizer solution Take 3 mLs by nebulization every 6 hours as needed for Wheezing 120 each 3    albuterol (PROVENTIL) (2.5 MG/3ML) 0.083% nebulizer solution Take 3 mLs by nebulization 4 times daily 120 each 3    sertraline (ZOLOFT) 50 MG tablet Take 1 tablet by mouth daily 30 tablet 3    acetaminophen (TYLENOL) 325 MG tablet Take 2 tablets by mouth every 6

## 2024-11-25 ENCOUNTER — APPOINTMENT (OUTPATIENT)
Dept: GENERAL RADIOLOGY | Age: 34
End: 2024-11-25
Payer: COMMERCIAL

## 2024-11-25 ENCOUNTER — HOSPITAL ENCOUNTER (EMERGENCY)
Age: 34
Discharge: HOME | End: 2024-11-25
Payer: COMMERCIAL

## 2024-11-25 VITALS
RESPIRATION RATE: 16 BRPM | SYSTOLIC BLOOD PRESSURE: 128 MMHG | OXYGEN SATURATION: 98 % | WEIGHT: 210 LBS | HEART RATE: 86 BPM | TEMPERATURE: 98 F | BODY MASS INDEX: 31.83 KG/M2 | HEIGHT: 68 IN | DIASTOLIC BLOOD PRESSURE: 78 MMHG

## 2024-11-25 DIAGNOSIS — R06.02 SHORTNESS OF BREATH: ICD-10-CM

## 2024-11-25 DIAGNOSIS — R05.1 ACUTE COUGH: Primary | ICD-10-CM

## 2024-11-25 LAB
ALBUMIN SERPL-MCNC: 4.2 G/DL (ref 3.5–5)
ALBUMIN/GLOB SERPL: 1.3 (ref 1–1.9)
ALP SERPL-CCNC: 112 U/L (ref 35–104)
ALT SERPL-CCNC: 17 U/L (ref 8–45)
ANION GAP SERPL CALC-SCNC: 13 MMOL/L (ref 7–16)
AST SERPL-CCNC: 31 U/L (ref 15–37)
BASOPHILS # BLD: 0 K/UL (ref 0–0.2)
BASOPHILS NFR BLD: 0 % (ref 0–2)
BILIRUB SERPL-MCNC: 0.6 MG/DL (ref 0–1.2)
BUN SERPL-MCNC: 10 MG/DL (ref 6–23)
CALCIUM SERPL-MCNC: 9.1 MG/DL (ref 8.8–10.2)
CHLORIDE SERPL-SCNC: 100 MMOL/L (ref 98–107)
CO2 SERPL-SCNC: 24 MMOL/L (ref 20–29)
CREAT SERPL-MCNC: 0.89 MG/DL (ref 0.6–1.1)
DIFFERENTIAL METHOD BLD: NORMAL
EKG ATRIAL RATE: 87 BPM
EKG DIAGNOSIS: NORMAL
EKG P AXIS: 45 DEGREES
EKG P-R INTERVAL: 168 MS
EKG Q-T INTERVAL: 366 MS
EKG QRS DURATION: 91 MS
EKG QTC CALCULATION (BAZETT): 441 MS
EKG R AXIS: 45 DEGREES
EKG T AXIS: 9 DEGREES
EKG VENTRICULAR RATE: 87 BPM
EOSINOPHIL # BLD: 0.1 K/UL (ref 0–0.8)
EOSINOPHIL NFR BLD: 1 % (ref 0.5–7.8)
ERYTHROCYTE [DISTWIDTH] IN BLOOD BY AUTOMATED COUNT: 14.1 % (ref 11.9–14.6)
FLUAV RNA SPEC QL NAA+PROBE: NOT DETECTED
FLUBV RNA SPEC QL NAA+PROBE: NOT DETECTED
GLOBULIN SER CALC-MCNC: 3.2 G/DL (ref 2.3–3.5)
GLUCOSE SERPL-MCNC: 85 MG/DL (ref 70–99)
HCT VFR BLD AUTO: 39.8 % (ref 35.8–46.3)
HGB BLD-MCNC: 13.4 G/DL (ref 11.7–15.4)
IMM GRANULOCYTES # BLD AUTO: 0 K/UL (ref 0–0.5)
IMM GRANULOCYTES NFR BLD AUTO: 0 % (ref 0–5)
LYMPHOCYTES # BLD: 2.2 K/UL (ref 0.5–4.6)
LYMPHOCYTES NFR BLD: 24 % (ref 13–44)
MCH RBC QN AUTO: 27.9 PG (ref 26.1–32.9)
MCHC RBC AUTO-ENTMCNC: 33.7 G/DL (ref 31.4–35)
MCV RBC AUTO: 82.9 FL (ref 82–102)
MONOCYTES # BLD: 0.5 K/UL (ref 0.1–1.3)
MONOCYTES NFR BLD: 5 % (ref 4–12)
NEUTS SEG # BLD: 6.4 K/UL (ref 1.7–8.2)
NEUTS SEG NFR BLD: 70 % (ref 43–78)
NRBC # BLD: 0 K/UL (ref 0–0.2)
PLATELET # BLD AUTO: 358 K/UL (ref 150–450)
PMV BLD AUTO: 9.8 FL (ref 9.4–12.3)
POTASSIUM SERPL-SCNC: 4.8 MMOL/L (ref 3.5–5.1)
PROT SERPL-MCNC: 7.3 G/DL (ref 6.3–8.2)
RBC # BLD AUTO: 4.8 M/UL (ref 4.05–5.2)
SARS-COV-2 RDRP RESP QL NAA+PROBE: NOT DETECTED
SODIUM SERPL-SCNC: 137 MMOL/L (ref 136–145)
SOURCE: NORMAL
TROPONIN T SERPL HS-MCNC: <6 NG/L (ref 0–14)
WBC # BLD AUTO: 9.2 K/UL (ref 4.3–11.1)

## 2024-11-25 PROCEDURE — 87502 INFLUENZA DNA AMP PROBE: CPT

## 2024-11-25 PROCEDURE — 85025 COMPLETE CBC W/AUTO DIFF WBC: CPT

## 2024-11-25 PROCEDURE — 80053 COMPREHEN METABOLIC PANEL: CPT

## 2024-11-25 PROCEDURE — 71046 X-RAY EXAM CHEST 2 VIEWS: CPT

## 2024-11-25 PROCEDURE — 87635 SARS-COV-2 COVID-19 AMP PRB: CPT

## 2024-11-25 PROCEDURE — 93005 ELECTROCARDIOGRAM TRACING: CPT | Performed by: STUDENT IN AN ORGANIZED HEALTH CARE EDUCATION/TRAINING PROGRAM

## 2024-11-25 PROCEDURE — 84484 ASSAY OF TROPONIN QUANT: CPT

## 2024-11-25 PROCEDURE — 93010 ELECTROCARDIOGRAM REPORT: CPT | Performed by: INTERNAL MEDICINE

## 2024-11-25 PROCEDURE — 99285 EMERGENCY DEPT VISIT HI MDM: CPT

## 2024-11-25 ASSESSMENT — ENCOUNTER SYMPTOMS
BACK PAIN: 0
EYE REDNESS: 0
CHEST TIGHTNESS: 1
DIARRHEA: 0
VOMITING: 0
RHINORRHEA: 0
SHORTNESS OF BREATH: 1
COUGH: 1
ABDOMINAL PAIN: 0
NAUSEA: 0
ABDOMINAL DISTENTION: 0
SORE THROAT: 1

## 2024-11-25 ASSESSMENT — LIFESTYLE VARIABLES
HOW OFTEN DO YOU HAVE A DRINK CONTAINING ALCOHOL: NEVER
HOW MANY STANDARD DRINKS CONTAINING ALCOHOL DO YOU HAVE ON A TYPICAL DAY: PATIENT DOES NOT DRINK

## 2024-11-25 ASSESSMENT — PAIN SCALES - GENERAL: PAINLEVEL_OUTOF10: 2

## 2024-11-25 ASSESSMENT — PAIN DESCRIPTION - LOCATION: LOCATION: CHEST

## 2024-11-25 ASSESSMENT — PAIN - FUNCTIONAL ASSESSMENT: PAIN_FUNCTIONAL_ASSESSMENT: 0-10

## 2024-11-25 NOTE — ED NOTES
Patient mobility status  with no difficulty.     I have reviewed discharge instructions with the patient.  The patient verbalized understanding.    Patient left ED via Discharge Method: ambulatory to Home with Spouse.    Opportunity for questions and clarification provided.     Patient given 0 scripts.

## 2024-11-25 NOTE — ED PROVIDER NOTES
Emergency Department Provider Note       PCP: Julisa Garcia, APRN - CNP   Age: 34 y.o.   Sex: female     DISPOSITION            No diagnosis found.    Medical Decision Making     Patient is a 84-year-old female presenting with cough chest tightness and shortness yesterday.  She states she feels like she is coming down with something and was concerned because she Quite sick in the beginning of October and had to be admitted for bilateral pneumonia with hypoxia.  She is afebrile nontoxic in appearance, vital signs within appropriate limits.  No signs of respiratory distress on exam, lungs clear to auscultation bilaterally.  Will check EKG, chest x-ray and labs including COVID and influenza testing.  Labs Reviewed   COMPREHENSIVE METABOLIC PANEL W/ REFLEX TO MG FOR LOW K - Abnormal; Notable for the following components:       Result Value    Alk Phosphatase 112 (*)     All other components within normal limits   INFLUENZA A/B, MOLECULAR   COVID-19, RAPID   CBC WITH AUTO DIFFERENTIAL   TROPONIN     Ambulatory pulse ox 98% on room air.  Chest x-ray without any pneumonia.  EKG without any ischemic changes.  Labs essentially unremarkable.  All results discussed at length with patient.  Do not see any reason for antibiotics at this time recommend supportive care with rest increase fluids and over-the-counter cough and cold remedies.     Discussed follow-up as well as reasons to return to the ED.  All agreeable to plan.    1 acute, uncomplicated illness or injury.  Over the counter drug management performed.  Shared medical decision making was utilized in creating the patients health plan today.    I independently ordered and reviewed each unique test.       I interpreted the X-rays chest x-ray without pneumonia pneumothorax.  My Independent EKG Interpretation: sinus rhythm, no evidence of arrhythmia      ST Segments:Normal ST segments - NO STEMI   Rate: 87 bpm            History     Patient is a 34-year-old female who

## 2024-11-25 NOTE — ED TRIAGE NOTES
Pt presents with complaint of with a complaint of tightness in chest, decreased O2 sat at work and tachycardia at work.  Pt endorses cough that started this AM.  Pt reports she was recently hospitalized for pneumonia.

## 2025-01-16 NOTE — PROGRESS NOTES
The patient is a 34 y.o.  who is seen for gyn ultrasound performed to follow up on SAB in October and to discuss irritability. US was mildly suggestive of RPOC vs possible clot in october. States cycles have been every 28 lasting 4-5. Denies menorrhagia/dysmenorrhea. H/o PCOS but states honestly cycles have been fairly regular overall. Notes she has seen significant shifts in irritability since SAB. States is yelling at her children, which is not normal for her. Denies s/h/I. States feels zoloft overall works well for her, but feels there is something else contributing. H/o vit D def, for which she's not been consistent with replacement.     Ultrasound findings from today:  GYN U/S SECONDARY TO: F/U DUB S/P SAB IN SEPT   CX: Appears WNL   UTERUS: Anteverted and heterogenous   ENDO= 6.4 mm, subcentimeter benign appearing calcification noted fundal. No intracavity masses visualized.   Both ovaries visualized with PCOS in appearance.   No adn masses or free fluid visualized.     HISTORY:    No LMP recorded. (Menstrual status: Other - See Notes).  Sexual History:  has sex with males  Contraception:  none  Current Outpatient Medications on File Prior to Visit   Medication Sig Dispense Refill    albuterol sulfate HFA (PROVENTIL HFA) 108 (90 Base) MCG/ACT inhaler Inhale 2 puffs into the lungs every 4 hours as needed for Wheezing or Shortness of Breath 2 each 11    fluticasone-salmeterol (ADVAIR DISKUS) 250-50 MCG/ACT AEPB diskus inhaler Inhale 1 puff into the lungs in the morning and 1 puff in the evening. Rinse mouth after use. 60 each 3    sertraline (ZOLOFT) 50 MG tablet Take 1 tablet by mouth daily 30 tablet 3     No current facility-administered medications on file prior to visit.       ROS:  Feeling well. No dyspnea or chest pain on exertion.  No abdominal pain, change in bowel habits, black or bloody stools.  No urinary tract symptoms. GYN ROS: see above.    PHYSICAL EXAM:  Blood pressure 120/78,

## 2025-01-20 ENCOUNTER — OFFICE VISIT (OUTPATIENT)
Dept: OBGYN CLINIC | Age: 35
End: 2025-01-20
Payer: COMMERCIAL

## 2025-01-20 ENCOUNTER — PROCEDURE VISIT (OUTPATIENT)
Dept: OBGYN CLINIC | Age: 35
End: 2025-01-20
Payer: COMMERCIAL

## 2025-01-20 VITALS
HEIGHT: 68 IN | DIASTOLIC BLOOD PRESSURE: 78 MMHG | BODY MASS INDEX: 32.34 KG/M2 | SYSTOLIC BLOOD PRESSURE: 120 MMHG | WEIGHT: 213.4 LBS

## 2025-01-20 DIAGNOSIS — R45.4 IRRITABILITY: ICD-10-CM

## 2025-01-20 DIAGNOSIS — N94.3 PMS (PREMENSTRUAL SYNDROME): ICD-10-CM

## 2025-01-20 DIAGNOSIS — E55.9 VITAMIN D DEFICIENCY: ICD-10-CM

## 2025-01-20 DIAGNOSIS — R45.4 IRRITABILITY: Primary | ICD-10-CM

## 2025-01-20 DIAGNOSIS — O03.9 MISCARRIAGE: ICD-10-CM

## 2025-01-20 DIAGNOSIS — N93.8 DUB (DYSFUNCTIONAL UTERINE BLEEDING): Primary | ICD-10-CM

## 2025-01-20 LAB
25(OH)D3 SERPL-MCNC: 39.9 NG/ML (ref 30–100)
T4 FREE SERPL-MCNC: 1.4 NG/DL (ref 0.9–1.7)
TSH, 3RD GENERATION: 1.18 UIU/ML (ref 0.27–4.2)

## 2025-01-20 PROCEDURE — 76830 TRANSVAGINAL US NON-OB: CPT | Performed by: OBSTETRICS & GYNECOLOGY

## 2025-01-20 PROCEDURE — 99214 OFFICE O/P EST MOD 30 MIN: CPT | Performed by: NURSE PRACTITIONER

## 2025-01-28 ENCOUNTER — OFFICE VISIT (OUTPATIENT)
Dept: FAMILY MEDICINE CLINIC | Facility: CLINIC | Age: 35
End: 2025-01-28

## 2025-01-28 VITALS
SYSTOLIC BLOOD PRESSURE: 100 MMHG | OXYGEN SATURATION: 97 % | HEIGHT: 68 IN | WEIGHT: 219.4 LBS | HEART RATE: 87 BPM | DIASTOLIC BLOOD PRESSURE: 68 MMHG | BODY MASS INDEX: 33.25 KG/M2

## 2025-01-28 DIAGNOSIS — E55.9 VITAMIN D DEFICIENCY: ICD-10-CM

## 2025-01-28 DIAGNOSIS — Z13.1 SCREENING FOR DIABETES MELLITUS: ICD-10-CM

## 2025-01-28 DIAGNOSIS — Z00.00 LABORATORY EXAMINATION ORDERED AS PART OF A COMPLETE PHYSICAL EXAMINATION: ICD-10-CM

## 2025-01-28 DIAGNOSIS — B96.89 ACUTE BACTERIAL SINUSITIS: Primary | ICD-10-CM

## 2025-01-28 DIAGNOSIS — J30.89 OTHER ALLERGIC RHINITIS: ICD-10-CM

## 2025-01-28 DIAGNOSIS — J01.90 ACUTE BACTERIAL SINUSITIS: Primary | ICD-10-CM

## 2025-01-28 DIAGNOSIS — F43.22 ADJUSTMENT DISORDER WITH ANXIETY: ICD-10-CM

## 2025-01-28 RX ORDER — FLUTICASONE PROPIONATE 50 MCG
2 SPRAY, SUSPENSION (ML) NASAL DAILY
Qty: 16 G | Refills: 5 | Status: SHIPPED | OUTPATIENT
Start: 2025-01-28

## 2025-01-28 RX ORDER — AZITHROMYCIN 250 MG/1
TABLET, FILM COATED ORAL
Qty: 6 TABLET | Refills: 0 | Status: SHIPPED | OUTPATIENT
Start: 2025-01-28

## 2025-01-28 RX ORDER — CETIRIZINE HYDROCHLORIDE 10 MG/1
10 TABLET ORAL DAILY
Qty: 90 TABLET | Refills: 0 | Status: SHIPPED | OUTPATIENT
Start: 2025-01-28

## 2025-01-28 RX ORDER — MOMETASONE FUROATE 100 UG/1
2 AEROSOL RESPIRATORY (INHALATION) 2 TIMES DAILY
Qty: 1 EACH | Refills: 2 | Status: SHIPPED | OUTPATIENT
Start: 2025-01-28

## 2025-01-28 RX ORDER — FLUCONAZOLE 150 MG/1
150 TABLET ORAL
Qty: 2 TABLET | Refills: 0 | Status: SHIPPED | OUTPATIENT
Start: 2025-01-28 | End: 2025-02-03

## 2025-01-28 ASSESSMENT — PATIENT HEALTH QUESTIONNAIRE - PHQ9
7. TROUBLE CONCENTRATING ON THINGS, SUCH AS READING THE NEWSPAPER OR WATCHING TELEVISION: NOT AT ALL
5. POOR APPETITE OR OVEREATING: NOT AT ALL
SUM OF ALL RESPONSES TO PHQ QUESTIONS 1-9: 2
8. MOVING OR SPEAKING SO SLOWLY THAT OTHER PEOPLE COULD HAVE NOTICED. OR THE OPPOSITE, BEING SO FIGETY OR RESTLESS THAT YOU HAVE BEEN MOVING AROUND A LOT MORE THAN USUAL: NOT AT ALL
3. TROUBLE FALLING OR STAYING ASLEEP: NOT AT ALL
9. THOUGHTS THAT YOU WOULD BE BETTER OFF DEAD, OR OF HURTING YOURSELF: NOT AT ALL
2. FEELING DOWN, DEPRESSED OR HOPELESS: NOT AT ALL
1. LITTLE INTEREST OR PLEASURE IN DOING THINGS: NOT AT ALL
4. FEELING TIRED OR HAVING LITTLE ENERGY: MORE THAN HALF THE DAYS
6. FEELING BAD ABOUT YOURSELF - OR THAT YOU ARE A FAILURE OR HAVE LET YOURSELF OR YOUR FAMILY DOWN: NOT AT ALL
10. IF YOU CHECKED OFF ANY PROBLEMS, HOW DIFFICULT HAVE THESE PROBLEMS MADE IT FOR YOU TO DO YOUR WORK, TAKE CARE OF THINGS AT HOME, OR GET ALONG WITH OTHER PEOPLE: NOT DIFFICULT AT ALL
SUM OF ALL RESPONSES TO PHQ QUESTIONS 1-9: 2
SUM OF ALL RESPONSES TO PHQ9 QUESTIONS 1 & 2: 0

## 2025-01-28 NOTE — PROGRESS NOTES
\"Have you been to the ER, urgent care clinic since your last visit?  Hospitalized since your last visit?\"    YES - When: approximately 2 months ago.  Where and Why: [facility] ED/shortness of breath.    “Have you seen or consulted any other health care providers outside our system since your last visit?”    NO

## 2025-02-01 PROBLEM — J96.01 ACUTE HYPOXIC RESPIRATORY FAILURE: Status: RESOLVED | Noted: 2024-10-04 | Resolved: 2025-02-01

## 2025-02-01 PROBLEM — Z3A.40 40 WEEKS GESTATION OF PREGNANCY: Status: RESOLVED | Noted: 2023-04-12 | Resolved: 2025-02-01

## 2025-02-01 PROBLEM — J15.9 COMMUNITY ACQUIRED BACTERIAL PNEUMONIA: Status: RESOLVED | Noted: 2024-10-04 | Resolved: 2025-02-01

## 2025-02-01 PROBLEM — Z23 ENCOUNTER FOR IMMUNIZATION: Status: RESOLVED | Noted: 2022-11-08 | Resolved: 2025-02-01

## 2025-02-01 PROBLEM — A49.1 GBS (GROUP B STREPTOCOCCUS) INFECTION: Status: RESOLVED | Noted: 2023-03-20 | Resolved: 2025-02-01

## 2025-02-01 PROBLEM — Z87.59 HISTORY OF PRE-ECLAMPSIA: Status: RESOLVED | Noted: 2022-09-08 | Resolved: 2025-02-01

## 2025-02-01 PROBLEM — J18.9 PNEUMONIA OF BOTH LUNGS DUE TO INFECTIOUS ORGANISM: Status: RESOLVED | Noted: 2024-10-04 | Resolved: 2025-02-01

## 2025-02-01 PROBLEM — Z87.59 HISTORY OF POSTPARTUM DEPRESSION: Status: RESOLVED | Noted: 2022-09-08 | Resolved: 2025-02-01

## 2025-02-01 PROBLEM — Z84.89 FAMILY HISTORY OF GENETIC DISORDER: Status: RESOLVED | Noted: 2022-09-08 | Resolved: 2025-02-01

## 2025-02-01 PROBLEM — O03.9 SPONTANEOUS ABORTION: Status: RESOLVED | Noted: 2024-10-04 | Resolved: 2025-02-01

## 2025-02-01 PROBLEM — Z28.39 MATERNAL VARICELLA, NON-IMMUNE: Status: RESOLVED | Noted: 2022-11-11 | Resolved: 2025-02-01

## 2025-02-01 PROBLEM — O09.899 MATERNAL VARICELLA, NON-IMMUNE: Status: RESOLVED | Noted: 2022-11-11 | Resolved: 2025-02-01

## 2025-02-01 PROBLEM — Z86.59 HISTORY OF POSTPARTUM DEPRESSION: Status: RESOLVED | Noted: 2022-09-08 | Resolved: 2025-02-01

## 2025-02-01 PROBLEM — Z37.9 NORMAL LABOR: Status: RESOLVED | Noted: 2023-04-12 | Resolved: 2025-02-01

## 2025-02-01 ASSESSMENT — ENCOUNTER SYMPTOMS
TROUBLE SWALLOWING: 0
RHINORRHEA: 0
COLOR CHANGE: 0
EYE ITCHING: 0
GASTROINTESTINAL NEGATIVE: 1
ANAL BLEEDING: 0
SHORTNESS OF BREATH: 0
SINUS PAIN: 0
BLOOD IN STOOL: 0
VOMITING: 0
APNEA: 0
ALLERGIC/IMMUNOLOGIC NEGATIVE: 1
COUGH: 1
STRIDOR: 0
BACK PAIN: 0
VOICE CHANGE: 0
EYE DISCHARGE: 0
CHOKING: 0
SORE THROAT: 1
ABDOMINAL DISTENTION: 0
FACIAL SWELLING: 0
RECTAL PAIN: 0
EYES NEGATIVE: 1
WHEEZING: 0
PHOTOPHOBIA: 0
NAUSEA: 0
CONSTIPATION: 0
SINUS PRESSURE: 0
DIARRHEA: 0
CHEST TIGHTNESS: 0
ABDOMINAL PAIN: 0
EYE PAIN: 0
EYE REDNESS: 0

## 2025-02-04 RX ORDER — SERTRALINE HYDROCHLORIDE 100 MG/1
100 TABLET, FILM COATED ORAL DAILY
Qty: 30 TABLET | Refills: 11 | OUTPATIENT
Start: 2025-02-04

## 2025-02-06 DIAGNOSIS — F43.22 ADJUSTMENT DISORDER WITH ANXIETY: ICD-10-CM

## 2025-02-07 ENCOUNTER — TELEPHONE (OUTPATIENT)
Dept: FAMILY MEDICINE CLINIC | Facility: CLINIC | Age: 35
End: 2025-02-07

## 2025-02-07 RX ORDER — ESCITALOPRAM OXALATE 10 MG/1
TABLET ORAL
Refills: 0 | OUTPATIENT
Start: 2025-02-07

## 2025-02-07 NOTE — TELEPHONE ENCOUNTER
Absolutely not. She needs to continue with this medication. This is ridiculous that medication is not covered. She has been on this medication and is doing very well.

## 2025-02-28 DIAGNOSIS — F43.22 ADJUSTMENT DISORDER WITH ANXIETY: ICD-10-CM

## 2025-03-19 ENCOUNTER — PATIENT MESSAGE (OUTPATIENT)
Dept: FAMILY MEDICINE CLINIC | Facility: CLINIC | Age: 35
End: 2025-03-19

## 2025-03-19 DIAGNOSIS — R50.9 FEVER, UNSPECIFIED FEVER CAUSE: Primary | ICD-10-CM

## 2025-03-20 ENCOUNTER — OFFICE VISIT (OUTPATIENT)
Dept: FAMILY MEDICINE CLINIC | Facility: CLINIC | Age: 35
End: 2025-03-20
Payer: COMMERCIAL

## 2025-03-20 VITALS
SYSTOLIC BLOOD PRESSURE: 99 MMHG | HEART RATE: 83 BPM | OXYGEN SATURATION: 98 % | HEIGHT: 68 IN | WEIGHT: 216 LBS | BODY MASS INDEX: 32.74 KG/M2 | TEMPERATURE: 98.3 F | DIASTOLIC BLOOD PRESSURE: 60 MMHG

## 2025-03-20 DIAGNOSIS — R05.9 COUGH, UNSPECIFIED TYPE: ICD-10-CM

## 2025-03-20 DIAGNOSIS — R50.9 FEVER, UNSPECIFIED FEVER CAUSE: Primary | ICD-10-CM

## 2025-03-20 PROCEDURE — 99214 OFFICE O/P EST MOD 30 MIN: CPT | Performed by: NURSE PRACTITIONER

## 2025-03-20 RX ORDER — CEFDINIR 300 MG/1
300 CAPSULE ORAL 2 TIMES DAILY
Qty: 14 CAPSULE | Refills: 0 | Status: SHIPPED | OUTPATIENT
Start: 2025-03-20 | End: 2025-03-27

## 2025-03-20 ASSESSMENT — PATIENT HEALTH QUESTIONNAIRE - PHQ9
3. TROUBLE FALLING OR STAYING ASLEEP: SEVERAL DAYS
2. FEELING DOWN, DEPRESSED OR HOPELESS: NOT AT ALL
8. MOVING OR SPEAKING SO SLOWLY THAT OTHER PEOPLE COULD HAVE NOTICED. OR THE OPPOSITE, BEING SO FIGETY OR RESTLESS THAT YOU HAVE BEEN MOVING AROUND A LOT MORE THAN USUAL: NOT AT ALL
10. IF YOU CHECKED OFF ANY PROBLEMS, HOW DIFFICULT HAVE THESE PROBLEMS MADE IT FOR YOU TO DO YOUR WORK, TAKE CARE OF THINGS AT HOME, OR GET ALONG WITH OTHER PEOPLE: NOT DIFFICULT AT ALL
SUM OF ALL RESPONSES TO PHQ QUESTIONS 1-9: 2
SUM OF ALL RESPONSES TO PHQ QUESTIONS 1-9: 2
5. POOR APPETITE OR OVEREATING: NOT AT ALL
9. THOUGHTS THAT YOU WOULD BE BETTER OFF DEAD, OR OF HURTING YOURSELF: NOT AT ALL
7. TROUBLE CONCENTRATING ON THINGS, SUCH AS READING THE NEWSPAPER OR WATCHING TELEVISION: NOT AT ALL
SUM OF ALL RESPONSES TO PHQ QUESTIONS 1-9: 2
6. FEELING BAD ABOUT YOURSELF - OR THAT YOU ARE A FAILURE OR HAVE LET YOURSELF OR YOUR FAMILY DOWN: NOT AT ALL
4. FEELING TIRED OR HAVING LITTLE ENERGY: SEVERAL DAYS
SUM OF ALL RESPONSES TO PHQ QUESTIONS 1-9: 2
1. LITTLE INTEREST OR PLEASURE IN DOING THINGS: NOT AT ALL

## 2025-03-20 ASSESSMENT — ENCOUNTER SYMPTOMS
SINUS PAIN: 0
COLOR CHANGE: 0
BACK PAIN: 0
EYE REDNESS: 0
ALLERGIC/IMMUNOLOGIC NEGATIVE: 1
ABDOMINAL PAIN: 0
EYE ITCHING: 0
SORE THROAT: 0
FACIAL SWELLING: 0
EYE PAIN: 0
STRIDOR: 0
EYE DISCHARGE: 0
GASTROINTESTINAL NEGATIVE: 1
CHOKING: 0
BLOOD IN STOOL: 0
RHINORRHEA: 0
RESPIRATORY NEGATIVE: 1
TROUBLE SWALLOWING: 0
ABDOMINAL DISTENTION: 0
CONSTIPATION: 0
COUGH: 0
VOICE CHANGE: 0
ANAL BLEEDING: 0
PHOTOPHOBIA: 0
CHEST TIGHTNESS: 0
SHORTNESS OF BREATH: 0
VOMITING: 0
WHEEZING: 0
APNEA: 0
RECTAL PAIN: 0
EYES NEGATIVE: 1
SINUS PRESSURE: 0
NAUSEA: 0
DIARRHEA: 0

## 2025-03-20 NOTE — PROGRESS NOTES
\"Have you been to the ER, urgent care clinic since your last visit?  Hospitalized since your last visit?\"    NO    “Have you seen or consulted any other health care providers outside our system since your last visit?”    NO            
compliance with the treatment plan as well as documenting on the day of the visit. Greater than 50% of this visit was spent counseling the patient about test results, prognosis, importance of compliance, education about disease process, benefits of medications, instructions for management of acute symptoms, and follow up plans.    Return if symptoms worsen or fail to improve.     Julisa Garcia, APRN - CNP  
Isotretinoin Counseling: Patient should get monthly blood tests, not donate blood, not drive at night if vision affected, not share medication, and not undergo elective surgery for 6 months after tx completed. Side effects reviewed, pt to contact office should one occur.

## 2025-03-28 ENCOUNTER — OFFICE VISIT (OUTPATIENT)
Dept: FAMILY MEDICINE CLINIC | Facility: CLINIC | Age: 35
End: 2025-03-28
Payer: COMMERCIAL

## 2025-03-28 VITALS
HEIGHT: 68 IN | SYSTOLIC BLOOD PRESSURE: 112 MMHG | OXYGEN SATURATION: 98 % | DIASTOLIC BLOOD PRESSURE: 72 MMHG | HEART RATE: 90 BPM | WEIGHT: 219 LBS | BODY MASS INDEX: 33.19 KG/M2

## 2025-03-28 DIAGNOSIS — R60.9 EDEMA, UNSPECIFIED TYPE: Primary | ICD-10-CM

## 2025-03-28 DIAGNOSIS — M25.50 ARTHRALGIA, UNSPECIFIED JOINT: ICD-10-CM

## 2025-03-28 DIAGNOSIS — Z87.01 HISTORY OF PNEUMONIA: ICD-10-CM

## 2025-03-28 LAB
ALBUMIN SERPL-MCNC: 4.2 G/DL (ref 3.5–5)
ALBUMIN/GLOB SERPL: 1.4 (ref 1–1.9)
ALP SERPL-CCNC: 78 U/L (ref 35–104)
ALT SERPL-CCNC: 24 U/L (ref 8–45)
ANION GAP SERPL CALC-SCNC: 10 MMOL/L (ref 7–16)
AST SERPL-CCNC: 20 U/L (ref 15–37)
BASOPHILS # BLD: 0.04 K/UL (ref 0–0.2)
BASOPHILS NFR BLD: 0.5 % (ref 0–2)
BILIRUB SERPL-MCNC: 0.5 MG/DL (ref 0–1.2)
BUN SERPL-MCNC: 10 MG/DL (ref 6–23)
CALCIUM SERPL-MCNC: 9.4 MG/DL (ref 8.8–10.2)
CHLORIDE SERPL-SCNC: 104 MMOL/L (ref 98–107)
CO2 SERPL-SCNC: 25 MMOL/L (ref 20–29)
CREAT SERPL-MCNC: 0.83 MG/DL (ref 0.6–1.1)
CRP SERPL-MCNC: 0.5 MG/DL (ref 0–0.4)
DIFFERENTIAL METHOD BLD: ABNORMAL
EOSINOPHIL # BLD: 0.05 K/UL (ref 0–0.8)
EOSINOPHIL NFR BLD: 0.7 % (ref 0.5–7.8)
ERYTHROCYTE [DISTWIDTH] IN BLOOD BY AUTOMATED COUNT: 13.9 % (ref 11.9–14.6)
ERYTHROCYTE [SEDIMENTATION RATE] IN BLOOD: 3 MM/HR (ref 0–20)
GLOBULIN SER CALC-MCNC: 3 G/DL (ref 2.3–3.5)
GLUCOSE SERPL-MCNC: 79 MG/DL (ref 70–99)
HCT VFR BLD AUTO: 36.9 % (ref 35.8–46.3)
HGB BLD-MCNC: 12.6 G/DL (ref 11.7–15.4)
IMM GRANULOCYTES # BLD AUTO: 0.03 K/UL (ref 0–0.5)
IMM GRANULOCYTES NFR BLD AUTO: 0.4 % (ref 0–5)
LYMPHOCYTES # BLD: 2.11 K/UL (ref 0.5–4.6)
LYMPHOCYTES NFR BLD: 27.7 % (ref 13–44)
MAGNESIUM SERPL-MCNC: 1.9 MG/DL (ref 1.8–2.4)
MCH RBC QN AUTO: 27.6 PG (ref 26.1–32.9)
MCHC RBC AUTO-ENTMCNC: 34.1 G/DL (ref 31.4–35)
MCV RBC AUTO: 80.7 FL (ref 82–102)
MONOCYTES # BLD: 0.42 K/UL (ref 0.1–1.3)
MONOCYTES NFR BLD: 5.5 % (ref 4–12)
NEUTS SEG # BLD: 4.98 K/UL (ref 1.7–8.2)
NEUTS SEG NFR BLD: 65.2 % (ref 43–78)
NRBC # BLD: 0 K/UL (ref 0–0.2)
PLATELET # BLD AUTO: 194 K/UL (ref 150–450)
PMV BLD AUTO: 12 FL (ref 9.4–12.3)
POTASSIUM SERPL-SCNC: 4.6 MMOL/L (ref 3.5–5.1)
PROT SERPL-MCNC: 7.1 G/DL (ref 6.3–8.2)
RBC # BLD AUTO: 4.57 M/UL (ref 4.05–5.2)
SODIUM SERPL-SCNC: 139 MMOL/L (ref 136–145)
TSH W FREE THYROID IF ABNORMAL: 1.16 UIU/ML (ref 0.27–4.2)
URATE SERPL-MCNC: 5.8 MG/DL (ref 2.5–7.1)
WBC # BLD AUTO: 7.6 K/UL (ref 4.3–11.1)

## 2025-03-28 PROCEDURE — 99214 OFFICE O/P EST MOD 30 MIN: CPT | Performed by: NURSE PRACTITIONER

## 2025-03-28 ASSESSMENT — ENCOUNTER SYMPTOMS
EYE REDNESS: 0
STRIDOR: 0
NAUSEA: 0
CONSTIPATION: 0
EYE ITCHING: 0
VOMITING: 0
SINUS PAIN: 0
PHOTOPHOBIA: 0
ABDOMINAL DISTENTION: 0
WHEEZING: 0
SINUS PRESSURE: 0
GASTROINTESTINAL NEGATIVE: 1
EYE PAIN: 0
ALLERGIC/IMMUNOLOGIC NEGATIVE: 1
ABDOMINAL PAIN: 0
VOICE CHANGE: 0
RHINORRHEA: 0
CHOKING: 0
FACIAL SWELLING: 0
SHORTNESS OF BREATH: 0
DIARRHEA: 0
RESPIRATORY NEGATIVE: 1
SORE THROAT: 0
COLOR CHANGE: 0
EYES NEGATIVE: 1
BLOOD IN STOOL: 0
COUGH: 0
TROUBLE SWALLOWING: 0
BACK PAIN: 0
APNEA: 0
CHEST TIGHTNESS: 0
RECTAL PAIN: 0
ANAL BLEEDING: 0
EYE DISCHARGE: 0

## 2025-03-28 NOTE — PROGRESS NOTES
PROGRESS NOTE    Chief Complaint   Patient presents with    Swelling     Patient experiencing swelling in lower extremities and hands started 4 days ago.       SUBJECTIVE:     Ileana Serrano is a very sweet and pleasant 34 y.o. female seen today in office as a work in for complaints of swelling to bilateral lower extremities and bilateral hands and arms started about 4 days ago.  She reports no trauma or injuries or falls and no changes in her routine or diet.  She reports having significant swelling and discomfort to her extremities.  She has had some shortness of breath particularly with exertion since having had pneumonia last year.  She recently was treated for bronchitis and is feeling somewhat better.  She has restarted her preventative inhaler due to worsening air quality.  She reports no sedentary shortness of breath and no palpitation or chest pain.  She reports that edema has increased some but there is still some present and mild pitting.  She reports no cough and no orthopnea.  She also reports no family history of abnormal blood clotting.      Past Medical History, Past Surgical History, Family history, Social History, and Medications were all reviewed with the patient today and updated as necessary.       Current Outpatient Medications   Medication Sig Dispense Refill    sertraline (ZOLOFT) 50 MG tablet TAKE 1 TABLET BY MOUTH EVERY DAY 90 tablet 2    fluticasone (FLONASE) 50 MCG/ACT nasal spray 2 sprays by Each Nostril route daily 16 g 5    cetirizine (ZYRTEC) 10 MG tablet Take 1 tablet by mouth daily 90 tablet 0    mometasone (ASMANEX HFA) 100 MCG/ACT AERO inhaler Inhale 2 puffs into the lungs 2 times daily Rinse mouth after use 1 each 2    albuterol sulfate HFA (PROVENTIL HFA) 108 (90 Base) MCG/ACT inhaler Inhale 2 puffs into the lungs every 4 hours as needed for Wheezing or Shortness of Breath 2 each 11     No current facility-administered medications for this visit.     Allergies

## 2025-03-29 LAB — ANA SER QL: NEGATIVE

## 2025-03-31 ENCOUNTER — RESULTS FOLLOW-UP (OUTPATIENT)
Dept: FAMILY MEDICINE CLINIC | Facility: CLINIC | Age: 35
End: 2025-03-31

## 2025-03-31 LAB
CCP IGA+IGG SERPL IA-ACNC: 8 UNITS (ref 0–19)
RHEUMATOID FACT SER QL LA: NEGATIVE

## 2025-04-26 DIAGNOSIS — J30.89 OTHER ALLERGIC RHINITIS: ICD-10-CM

## 2025-04-28 RX ORDER — CETIRIZINE HYDROCHLORIDE 10 MG/1
10 TABLET ORAL DAILY
Qty: 90 TABLET | Refills: 0 | Status: SHIPPED | OUTPATIENT
Start: 2025-04-28

## 2025-06-06 ENCOUNTER — RESULTS FOLLOW-UP (OUTPATIENT)
Dept: FAMILY MEDICINE CLINIC | Facility: CLINIC | Age: 35
End: 2025-06-06

## 2025-06-06 ENCOUNTER — OFFICE VISIT (OUTPATIENT)
Dept: FAMILY MEDICINE CLINIC | Facility: CLINIC | Age: 35
End: 2025-06-06
Payer: COMMERCIAL

## 2025-06-06 VITALS
OXYGEN SATURATION: 97 % | HEART RATE: 97 BPM | HEIGHT: 68 IN | TEMPERATURE: 98.7 F | BODY MASS INDEX: 31.31 KG/M2 | SYSTOLIC BLOOD PRESSURE: 99 MMHG | WEIGHT: 206.6 LBS | DIASTOLIC BLOOD PRESSURE: 64 MMHG

## 2025-06-06 DIAGNOSIS — M25.50 ARTHRALGIA, UNSPECIFIED JOINT: ICD-10-CM

## 2025-06-06 DIAGNOSIS — J30.89 OTHER ALLERGIC RHINITIS: ICD-10-CM

## 2025-06-06 DIAGNOSIS — Z13.1 SCREENING FOR DIABETES MELLITUS: ICD-10-CM

## 2025-06-06 DIAGNOSIS — Z00.00 LABORATORY EXAMINATION ORDERED AS PART OF A COMPLETE PHYSICAL EXAMINATION: ICD-10-CM

## 2025-06-06 DIAGNOSIS — R05.9 COUGH, UNSPECIFIED TYPE: Primary | ICD-10-CM

## 2025-06-06 DIAGNOSIS — Z88.0 HISTORY OF PENICILLIN ALLERGY: ICD-10-CM

## 2025-06-06 DIAGNOSIS — B96.89 ACUTE BACTERIAL SINUSITIS: ICD-10-CM

## 2025-06-06 DIAGNOSIS — J01.90 ACUTE BACTERIAL SINUSITIS: ICD-10-CM

## 2025-06-06 DIAGNOSIS — E55.9 VITAMIN D DEFICIENCY: ICD-10-CM

## 2025-06-06 DIAGNOSIS — J18.9 RECURRENT PNEUMONIA: ICD-10-CM

## 2025-06-06 PROCEDURE — 99214 OFFICE O/P EST MOD 30 MIN: CPT | Performed by: NURSE PRACTITIONER

## 2025-06-06 RX ORDER — PREDNISONE 10 MG/1
TABLET ORAL
Qty: 11 TABLET | Refills: 0 | Status: SHIPPED | OUTPATIENT
Start: 2025-06-06

## 2025-06-06 RX ORDER — DOXYCYCLINE 100 MG/1
100 TABLET ORAL 2 TIMES DAILY
Qty: 20 TABLET | Refills: 0 | Status: SHIPPED | OUTPATIENT
Start: 2025-06-06 | End: 2025-06-16

## 2025-06-06 RX ORDER — DEXTROMETHORPHAN HYDROBROMIDE AND PROMETHAZINE HYDROCHLORIDE 15; 6.25 MG/5ML; MG/5ML
5 SYRUP ORAL 4 TIMES DAILY PRN
Qty: 150 ML | Refills: 0 | Status: SHIPPED | OUTPATIENT
Start: 2025-06-06

## 2025-06-06 ASSESSMENT — PATIENT HEALTH QUESTIONNAIRE - PHQ9
SUM OF ALL RESPONSES TO PHQ QUESTIONS 1-9: 0
SUM OF ALL RESPONSES TO PHQ QUESTIONS 1-9: 0
1. LITTLE INTEREST OR PLEASURE IN DOING THINGS: NOT AT ALL
2. FEELING DOWN, DEPRESSED OR HOPELESS: NOT AT ALL
SUM OF ALL RESPONSES TO PHQ QUESTIONS 1-9: 0
SUM OF ALL RESPONSES TO PHQ QUESTIONS 1-9: 0

## 2025-06-06 NOTE — PROGRESS NOTES
PROGRESS NOTE    Chief Complaint   Patient presents with    Follow-up     Patient states she feels like pneumonia has reoccurred. Sx's fever/shortness of breathe/cold/fatigue for 3 days.       SUBJECTIVE:         History of Present Illness  The patient presents for evaluation of chest tightness.    She recently took her children to a pool, where she spent approximately 3 hours in cold water, resulting in numbness. She felt unwell the following Wednesday.  She felt feverish last Wednesday, characterized by chills and a temperature of 98 degrees, despite feeling extremely cold. Her temperature fluctuated between 98 and 99 degrees last night, but she perceived it as significantly higher, around 104 degrees. She has been using Motrin for symptom management. She reports experiencing chest tightness, which was accompanied by a productive cough with yellow sputum yesterday morning. Today, she has been coughing more frequently. She has been using her rescue inhaler and has initiated Mucinex therapy.    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 She increased her Zyrtec dosage due to nasal congestion, which has since resolved. She reports excessive sleepiness and does not require any sleep aids. She takes vitamin D daily.    She recalls a previous episode of swelling following antibiotic treatment, specifically cefdinir. She has significantly reduced her sugar intake, which she believes has contributed to a decrease in hand and arm swelling. She has lost 13 pounds since her last visit and has started the [employer] diet, which she finds beneficial.    She expresses interest in undergoing penicillin testing this summer.        Past Medical History, Past Surgical History, Family history, Social History, and Medications were all reviewed with the patient today and updated as necessary.       Current Outpatient Medications   Medication Sig Dispense Refill

## 2025-06-10 ENCOUNTER — LAB (OUTPATIENT)
Dept: FAMILY MEDICINE CLINIC | Facility: CLINIC | Age: 35
End: 2025-06-10
Payer: COMMERCIAL

## 2025-06-10 DIAGNOSIS — M25.50 ARTHRALGIA, UNSPECIFIED JOINT: ICD-10-CM

## 2025-06-10 DIAGNOSIS — Z13.1 SCREENING FOR DIABETES MELLITUS: ICD-10-CM

## 2025-06-10 DIAGNOSIS — E55.9 VITAMIN D DEFICIENCY: ICD-10-CM

## 2025-06-10 DIAGNOSIS — Z00.00 LABORATORY EXAMINATION ORDERED AS PART OF A COMPLETE PHYSICAL EXAMINATION: ICD-10-CM

## 2025-06-10 LAB
25(OH)D3 SERPL-MCNC: 39.6 NG/ML (ref 30–100)
ALBUMIN SERPL-MCNC: 4 G/DL (ref 3.5–5)
ALBUMIN/GLOB SERPL: 1.4 (ref 1–1.9)
ALP SERPL-CCNC: 72 U/L (ref 35–104)
ALT SERPL-CCNC: 38 U/L (ref 8–45)
ANION GAP SERPL CALC-SCNC: 12 MMOL/L (ref 7–16)
AST SERPL-CCNC: 25 U/L (ref 15–37)
BASOPHILS # BLD: 0.01 K/UL (ref 0–0.2)
BASOPHILS NFR BLD: 0.2 % (ref 0–2)
BILIRUB SERPL-MCNC: 0.5 MG/DL (ref 0–1.2)
BILIRUBIN, URINE, POC: NEGATIVE
BLOOD URINE, POC: ABNORMAL
BUN SERPL-MCNC: 14 MG/DL (ref 6–23)
CALCIUM SERPL-MCNC: 9.5 MG/DL (ref 8.8–10.2)
CHLORIDE SERPL-SCNC: 104 MMOL/L (ref 98–107)
CHOLEST SERPL-MCNC: 146 MG/DL (ref 0–200)
CO2 SERPL-SCNC: 26 MMOL/L (ref 20–29)
CREAT SERPL-MCNC: 0.84 MG/DL (ref 0.6–1.1)
CRP SERPL-MCNC: 0.6 MG/DL (ref 0–0.4)
DIFFERENTIAL METHOD BLD: NORMAL
EOSINOPHIL # BLD: 0.06 K/UL (ref 0–0.8)
EOSINOPHIL NFR BLD: 0.9 % (ref 0.5–7.8)
ERYTHROCYTE [DISTWIDTH] IN BLOOD BY AUTOMATED COUNT: 13.5 % (ref 11.9–14.6)
ERYTHROCYTE [SEDIMENTATION RATE] IN BLOOD: 4 MM/HR (ref 0–20)
EST. AVERAGE GLUCOSE BLD GHB EST-MCNC: 97 MG/DL
GLOBULIN SER CALC-MCNC: 2.9 G/DL (ref 2.3–3.5)
GLUCOSE SERPL-MCNC: 82 MG/DL (ref 70–99)
GLUCOSE URINE, POC: NEGATIVE
HBA1C MFR BLD: 5 % (ref 0–5.6)
HCT VFR BLD AUTO: 39.3 % (ref 35.8–46.3)
HDLC SERPL-MCNC: 39 MG/DL (ref 40–60)
HDLC SERPL: 3.7 (ref 0–5)
HGB BLD-MCNC: 13 G/DL (ref 11.7–15.4)
IMM GRANULOCYTES # BLD AUTO: 0.01 K/UL (ref 0–0.5)
IMM GRANULOCYTES NFR BLD AUTO: 0.2 % (ref 0–5)
KETONES, URINE, POC: NEGATIVE
LDLC SERPL CALC-MCNC: 85 MG/DL (ref 0–100)
LEUKOCYTE ESTERASE, URINE, POC: NEGATIVE
LYMPHOCYTES # BLD: 2.88 K/UL (ref 0.5–4.6)
LYMPHOCYTES NFR BLD: 43.7 % (ref 13–44)
MCH RBC QN AUTO: 27.6 PG (ref 26.1–32.9)
MCHC RBC AUTO-ENTMCNC: 33.1 G/DL (ref 31.4–35)
MCV RBC AUTO: 83.4 FL (ref 82–102)
MONOCYTES # BLD: 0.49 K/UL (ref 0.1–1.3)
MONOCYTES NFR BLD: 7.4 % (ref 4–12)
NEUTS SEG # BLD: 3.14 K/UL (ref 1.7–8.2)
NEUTS SEG NFR BLD: 47.6 % (ref 43–78)
NITRITE, URINE, POC: NEGATIVE
NRBC # BLD: 0 K/UL (ref 0–0.2)
PH, URINE, POC: 6 (ref 4.6–8)
PLATELET # BLD AUTO: 255 K/UL (ref 150–450)
PMV BLD AUTO: 10.7 FL (ref 9.4–12.3)
POTASSIUM SERPL-SCNC: 3.9 MMOL/L (ref 3.5–5.1)
PROT SERPL-MCNC: 6.9 G/DL (ref 6.3–8.2)
PROTEIN,URINE, POC: NEGATIVE
RBC # BLD AUTO: 4.71 M/UL (ref 4.05–5.2)
SODIUM SERPL-SCNC: 142 MMOL/L (ref 136–145)
SPECIFIC GRAVITY, URINE, POC: 1.01 (ref 1–1.03)
TRIGL SERPL-MCNC: 111 MG/DL (ref 0–150)
TSH W FREE THYROID IF ABNORMAL: 2.34 UIU/ML (ref 0.27–4.2)
URATE SERPL-MCNC: 6 MG/DL (ref 2.5–7.1)
URINALYSIS CLARITY, POC: ABNORMAL
URINALYSIS COLOR, POC: YELLOW
UROBILINOGEN, POC: NORMAL
VLDLC SERPL CALC-MCNC: 22 MG/DL (ref 6–23)
WBC # BLD AUTO: 6.6 K/UL (ref 4.3–11.1)

## 2025-06-10 PROCEDURE — 81003 URINALYSIS AUTO W/O SCOPE: CPT | Performed by: NURSE PRACTITIONER

## 2025-06-11 LAB
ANA SER QL: NEGATIVE
RHEUMATOID FACT SER QL LA: NEGATIVE

## 2025-06-12 LAB — CCP IGA+IGG SERPL IA-ACNC: 2 UNITS (ref 0–19)

## 2025-06-13 ENCOUNTER — OFFICE VISIT (OUTPATIENT)
Dept: FAMILY MEDICINE CLINIC | Facility: CLINIC | Age: 35
End: 2025-06-13
Payer: COMMERCIAL

## 2025-06-13 VITALS
DIASTOLIC BLOOD PRESSURE: 62 MMHG | BODY MASS INDEX: 31.16 KG/M2 | HEART RATE: 76 BPM | HEIGHT: 68 IN | WEIGHT: 205.6 LBS | OXYGEN SATURATION: 99 % | SYSTOLIC BLOOD PRESSURE: 104 MMHG

## 2025-06-13 DIAGNOSIS — R19.7 DIARRHEA, UNSPECIFIED TYPE: ICD-10-CM

## 2025-06-13 DIAGNOSIS — J40 BRONCHITIS: ICD-10-CM

## 2025-06-13 DIAGNOSIS — M25.50 ARTHRALGIA, UNSPECIFIED JOINT: Primary | ICD-10-CM

## 2025-06-13 PROCEDURE — 99214 OFFICE O/P EST MOD 30 MIN: CPT | Performed by: NURSE PRACTITIONER

## 2025-06-13 RX ORDER — PREDNISONE 10 MG/1
TABLET ORAL
Qty: 11 TABLET | Refills: 0 | Status: SHIPPED | OUTPATIENT
Start: 2025-06-13

## 2025-06-13 RX ORDER — DOXYCYCLINE 100 MG/1
100 TABLET ORAL 2 TIMES DAILY
Qty: 20 TABLET | Refills: 0 | Status: SHIPPED | OUTPATIENT
Start: 2025-06-13 | End: 2025-06-23

## 2025-06-13 ASSESSMENT — PATIENT HEALTH QUESTIONNAIRE - PHQ9
SUM OF ALL RESPONSES TO PHQ QUESTIONS 1-9: 0
1. LITTLE INTEREST OR PLEASURE IN DOING THINGS: NOT AT ALL
2. FEELING DOWN, DEPRESSED OR HOPELESS: NOT AT ALL

## 2025-06-16 ASSESSMENT — ENCOUNTER SYMPTOMS
DIARRHEA: 0
EYE ITCHING: 0
WHEEZING: 0
VOICE CHANGE: 0
CONSTIPATION: 0
RESPIRATORY NEGATIVE: 1
ABDOMINAL PAIN: 0
RECTAL PAIN: 0
GASTROINTESTINAL NEGATIVE: 1
EYE REDNESS: 0
SINUS PRESSURE: 0
ALLERGIC/IMMUNOLOGIC NEGATIVE: 1
SHORTNESS OF BREATH: 0
BACK PAIN: 0
COUGH: 0
BLOOD IN STOOL: 0
COLOR CHANGE: 0
CHEST TIGHTNESS: 0
STRIDOR: 0
ANAL BLEEDING: 0
RHINORRHEA: 0
PHOTOPHOBIA: 0
EYE DISCHARGE: 0
NAUSEA: 0
VOMITING: 0
FACIAL SWELLING: 0
EYES NEGATIVE: 1
APNEA: 0
TROUBLE SWALLOWING: 0
EYE PAIN: 0
SINUS PAIN: 0
SORE THROAT: 0
CHOKING: 0
ABDOMINAL DISTENTION: 0

## 2025-08-04 ENCOUNTER — OFFICE VISIT (OUTPATIENT)
Dept: PULMONOLOGY | Age: 35
End: 2025-08-04
Payer: COMMERCIAL

## 2025-08-04 VITALS
BODY MASS INDEX: 31.24 KG/M2 | WEIGHT: 206.1 LBS | RESPIRATION RATE: 18 BRPM | OXYGEN SATURATION: 96 % | HEIGHT: 68 IN | HEART RATE: 89 BPM | DIASTOLIC BLOOD PRESSURE: 84 MMHG | SYSTOLIC BLOOD PRESSURE: 116 MMHG | TEMPERATURE: 98.5 F

## 2025-08-04 DIAGNOSIS — J45.20 MILD INTERMITTENT ASTHMA WITHOUT COMPLICATION: ICD-10-CM

## 2025-08-04 DIAGNOSIS — R91.8 ABNORMAL CT SCAN OF LUNG: ICD-10-CM

## 2025-08-04 DIAGNOSIS — J30.89 OTHER ALLERGIC RHINITIS: ICD-10-CM

## 2025-08-04 DIAGNOSIS — T78.40XA ALLERGY, INITIAL ENCOUNTER: Primary | ICD-10-CM

## 2025-08-04 LAB
EXPIRATORY TIME: NORMAL
FEF 25-75% %PRED-PRE: NORMAL
FEF 25-75% PRED: NORMAL
FEF 25-75-PRE: NORMAL
FENO: 27 PPB
FEV1 %PRED-PRE: 82 %
FEV1 PRED: 3.53 L
FEV1/FVC %PRED-PRE: NORMAL
FEV1/FVC PRED: NORMAL
FEV1/FVC: 81 %
FEV1: 2.89 L
FVC %PRED-PRE: 83 %
FVC PRED: 4.29 L
FVC: 3.56 L
PEF %PRED-PRE: NORMAL
PEF PRED: NORMAL
PEF-PRE: NORMAL

## 2025-08-04 PROCEDURE — 99204 OFFICE O/P NEW MOD 45 MIN: CPT | Performed by: NURSE PRACTITIONER

## 2025-08-04 PROCEDURE — 94010 BREATHING CAPACITY TEST: CPT | Performed by: NURSE PRACTITIONER

## 2025-08-04 PROCEDURE — 95012 NITRIC OXIDE EXP GAS DETER: CPT | Performed by: NURSE PRACTITIONER

## 2025-08-04 ASSESSMENT — PULMONARY FUNCTION TESTS
FEV1: 2.89
FVC: 3.56
FVC_PREDICTED: 4.29
FVC_PERCENT_PREDICTED_PRE: 83
FENO: 27
FEV1_PREDICTED: 3.53
FEV1_PERCENT_PREDICTED_PRE: 82
FEV1/FVC: 81

## 2025-08-06 RX ORDER — CETIRIZINE HYDROCHLORIDE 10 MG/1
10 TABLET ORAL DAILY
Qty: 90 TABLET | Refills: 3 | Status: SHIPPED | OUTPATIENT
Start: 2025-08-06